# Patient Record
Sex: FEMALE | Race: WHITE | NOT HISPANIC OR LATINO | Employment: FULL TIME | URBAN - METROPOLITAN AREA
[De-identification: names, ages, dates, MRNs, and addresses within clinical notes are randomized per-mention and may not be internally consistent; named-entity substitution may affect disease eponyms.]

---

## 2017-01-12 ENCOUNTER — HOSPITAL ENCOUNTER (OUTPATIENT)
Dept: SLEEP CENTER | Facility: CLINIC | Age: 47
Discharge: HOME | End: 2017-01-12
Payer: COMMERCIAL

## 2017-01-12 ENCOUNTER — TRANSCRIBE ORDERS (OUTPATIENT)
Dept: SLEEP CENTER | Facility: CLINIC | Age: 47
End: 2017-01-12

## 2017-01-12 DIAGNOSIS — G47.33 OSA (OBSTRUCTIVE SLEEP APNEA): Primary | ICD-10-CM

## 2017-01-12 DIAGNOSIS — G47.33 OBSTRUCTIVE SLEEP APNEA (ADULT) (PEDIATRIC): ICD-10-CM

## 2017-01-12 DIAGNOSIS — G47.33 OSA (OBSTRUCTIVE SLEEP APNEA): ICD-10-CM

## 2017-01-12 PROCEDURE — 95811 POLYSOM 6/>YRS CPAP 4/> PARM: CPT

## 2017-01-26 ENCOUNTER — HOSPITAL ENCOUNTER (OUTPATIENT)
Dept: SLEEP CENTER | Facility: CLINIC | Age: 47
Discharge: HOME | End: 2017-01-26
Payer: COMMERCIAL

## 2017-01-26 ENCOUNTER — TRANSCRIBE ORDERS (OUTPATIENT)
Dept: SLEEP CENTER | Facility: CLINIC | Age: 47
End: 2017-01-26

## 2017-01-26 DIAGNOSIS — G47.33 OSA (OBSTRUCTIVE SLEEP APNEA): Primary | ICD-10-CM

## 2017-01-26 DIAGNOSIS — G47.33 OSA (OBSTRUCTIVE SLEEP APNEA): ICD-10-CM

## 2017-03-28 ENCOUNTER — TRANSCRIBE ORDERS (OUTPATIENT)
Dept: SLEEP CENTER | Facility: CLINIC | Age: 47
End: 2017-03-28

## 2017-03-28 ENCOUNTER — HOSPITAL ENCOUNTER (OUTPATIENT)
Dept: SLEEP CENTER | Facility: CLINIC | Age: 47
Discharge: HOME | End: 2017-03-28
Payer: COMMERCIAL

## 2017-03-28 DIAGNOSIS — G47.33 OSA (OBSTRUCTIVE SLEEP APNEA): Primary | ICD-10-CM

## 2017-03-28 DIAGNOSIS — G47.33 OSA (OBSTRUCTIVE SLEEP APNEA): ICD-10-CM

## 2017-04-10 ENCOUNTER — GENERIC CONVERSION - ENCOUNTER (OUTPATIENT)
Dept: OTHER | Facility: OTHER | Age: 47
End: 2017-04-10

## 2017-08-01 ENCOUNTER — ALLSCRIPTS OFFICE VISIT (OUTPATIENT)
Dept: OTHER | Facility: OTHER | Age: 47
End: 2017-08-01

## 2017-08-01 ENCOUNTER — LAB REQUISITION (OUTPATIENT)
Dept: LAB | Facility: HOSPITAL | Age: 47
End: 2017-08-01
Payer: COMMERCIAL

## 2017-08-01 DIAGNOSIS — E78.5 HYPERLIPIDEMIA: ICD-10-CM

## 2017-08-01 DIAGNOSIS — I10 ESSENTIAL (PRIMARY) HYPERTENSION: ICD-10-CM

## 2017-08-01 LAB
ALBUMIN SERPL BCP-MCNC: 3.9 G/DL (ref 3.5–5)
ALP SERPL-CCNC: 110 U/L (ref 46–116)
ALT SERPL W P-5'-P-CCNC: 33 U/L (ref 12–78)
ANION GAP SERPL CALCULATED.3IONS-SCNC: 7 MMOL/L (ref 4–13)
AST SERPL W P-5'-P-CCNC: 23 U/L (ref 5–45)
BASOPHILS # BLD AUTO: 0.03 THOUSANDS/ΜL (ref 0–0.1)
BASOPHILS NFR BLD AUTO: 1 % (ref 0–1)
BILIRUB SERPL-MCNC: 0.36 MG/DL (ref 0.2–1)
BUN SERPL-MCNC: 16 MG/DL (ref 5–25)
CALCIUM SERPL-MCNC: 8.7 MG/DL (ref 8.3–10.1)
CHLORIDE SERPL-SCNC: 104 MMOL/L (ref 100–108)
CHOLEST SERPL-MCNC: 208 MG/DL (ref 50–200)
CO2 SERPL-SCNC: 31 MMOL/L (ref 21–32)
CREAT SERPL-MCNC: 0.92 MG/DL (ref 0.6–1.3)
EOSINOPHIL # BLD AUTO: 0.07 THOUSAND/ΜL (ref 0–0.61)
EOSINOPHIL NFR BLD AUTO: 1 % (ref 0–6)
ERYTHROCYTE [DISTWIDTH] IN BLOOD BY AUTOMATED COUNT: 14.1 % (ref 11.6–15.1)
EST. AVERAGE GLUCOSE BLD GHB EST-MCNC: 126 MG/DL
GFR SERPL CREATININE-BSD FRML MDRD: 74 ML/MIN/1.73SQ M
GLUCOSE SERPL-MCNC: 89 MG/DL (ref 65–140)
HBA1C MFR BLD: 6 % (ref 4.2–6.3)
HCT VFR BLD AUTO: 37.1 % (ref 34.8–46.1)
HDLC SERPL-MCNC: 51 MG/DL (ref 40–60)
HGB BLD-MCNC: 12.8 G/DL (ref 11.5–15.4)
LDLC SERPL CALC-MCNC: 126 MG/DL (ref 0–100)
LYMPHOCYTES # BLD AUTO: 1.55 THOUSANDS/ΜL (ref 0.6–4.47)
LYMPHOCYTES NFR BLD AUTO: 28 % (ref 14–44)
MCH RBC QN AUTO: 28.6 PG (ref 26.8–34.3)
MCHC RBC AUTO-ENTMCNC: 34.5 G/DL (ref 31.4–37.4)
MCV RBC AUTO: 83 FL (ref 82–98)
MONOCYTES # BLD AUTO: 0.4 THOUSAND/ΜL (ref 0.17–1.22)
MONOCYTES NFR BLD AUTO: 7 % (ref 4–12)
NEUTROPHILS # BLD AUTO: 3.44 THOUSANDS/ΜL (ref 1.85–7.62)
NEUTS SEG NFR BLD AUTO: 63 % (ref 43–75)
NRBC BLD AUTO-RTO: 0 /100 WBCS
PLATELET # BLD AUTO: 174 THOUSANDS/UL (ref 149–390)
PMV BLD AUTO: 11.4 FL (ref 8.9–12.7)
POTASSIUM SERPL-SCNC: 3.7 MMOL/L (ref 3.5–5.3)
PROT SERPL-MCNC: 7.3 G/DL (ref 6.4–8.2)
RBC # BLD AUTO: 4.47 MILLION/UL (ref 3.81–5.12)
SODIUM SERPL-SCNC: 142 MMOL/L (ref 136–145)
TRIGL SERPL-MCNC: 155 MG/DL
TSH SERPL DL<=0.05 MIU/L-ACNC: 3.52 UIU/ML (ref 0.36–3.74)
WBC # BLD AUTO: 5.5 THOUSAND/UL (ref 4.31–10.16)

## 2017-08-01 PROCEDURE — 83036 HEMOGLOBIN GLYCOSYLATED A1C: CPT | Performed by: FAMILY MEDICINE

## 2017-08-01 PROCEDURE — 80053 COMPREHEN METABOLIC PANEL: CPT | Performed by: FAMILY MEDICINE

## 2017-08-01 PROCEDURE — 84443 ASSAY THYROID STIM HORMONE: CPT | Performed by: FAMILY MEDICINE

## 2017-08-01 PROCEDURE — 80061 LIPID PANEL: CPT | Performed by: FAMILY MEDICINE

## 2017-08-01 PROCEDURE — 85025 COMPLETE CBC W/AUTO DIFF WBC: CPT | Performed by: FAMILY MEDICINE

## 2017-08-04 ENCOUNTER — GENERIC CONVERSION - ENCOUNTER (OUTPATIENT)
Dept: OTHER | Facility: OTHER | Age: 47
End: 2017-08-04

## 2017-10-19 ENCOUNTER — TRANSCRIBE ORDERS (OUTPATIENT)
Dept: ADMINISTRATIVE | Facility: HOSPITAL | Age: 47
End: 2017-10-19

## 2017-10-19 DIAGNOSIS — Z12.39 SCREENING BREAST EXAMINATION: Primary | ICD-10-CM

## 2017-11-18 ENCOUNTER — HOSPITAL ENCOUNTER (OUTPATIENT)
Dept: MAMMOGRAPHY | Facility: MEDICAL CENTER | Age: 47
Discharge: HOME | End: 2017-11-18
Payer: COMMERCIAL

## 2017-11-18 DIAGNOSIS — Z12.39 SCREENING BREAST EXAMINATION: ICD-10-CM

## 2017-11-18 PROCEDURE — 77063 BREAST TOMOSYNTHESIS BI: CPT

## 2017-11-18 PROCEDURE — G0202 SCR MAMMO BI INCL CAD: HCPCS

## 2018-01-12 VITALS
BODY MASS INDEX: 28.56 KG/M2 | WEIGHT: 182 LBS | HEIGHT: 67 IN | DIASTOLIC BLOOD PRESSURE: 80 MMHG | TEMPERATURE: 97.3 F | SYSTOLIC BLOOD PRESSURE: 120 MMHG

## 2018-01-12 NOTE — RESULT NOTES
Discussion/Summary   Blood testing looks okay  Verified Results  (1) CBC/PLT/DIFF [id number] 07:18PM Cindy Norris Order Number: [id number]     Test Name Result Flag Reference   WBC COUNT 5 50 Thousand/uL  4 31-10 16   RBC COUNT 4 47 Million/uL  3 81-5 12   HEMOGLOBIN 12 8 g/dL  11 5-15 4   HEMATOCRIT 37 1 %  34 8-46  1   MCV 83 fL  82-98   MCH 28 6 pg  26 8-34 3   MCHC 34 5 g/dL  31 4-37 4   RDW 14 1 %  11 6-15 1   MPV 11 4 fL  8 9-12 7   PLATELET COUNT 305 Thousands/uL  149-390   nRBC AUTOMATED 0 /100 WBCs     NEUTROPHILS RELATIVE PERCENT 63 %  43-75   LYMPHOCYTES RELATIVE PERCENT 28 %  14-44   MONOCYTES RELATIVE PERCENT 7 %  4-12   EOSINOPHILS RELATIVE PERCENT 1 %  0-6   BASOPHILS RELATIVE PERCENT 1 %  0-1   NEUTROPHILS ABSOLUTE COUNT 3 44 Thousands/? ??L  1 85-7 62   LYMPHOCYTES ABSOLUTE COUNT 1 55 Thousands/? ??L  0 60-4 47   MONOCYTES ABSOLUTE COUNT 0 40 Thousand/? ??L  0 17-1 22   EOSINOPHILS ABSOLUTE COUNT 0 07 Thousand/? ??L  0 00-0 61   BASOPHILS ABSOLUTE COUNT 0 03 Thousands/? ??L  0 00-0 10     (1) COMPREHENSIVE METABOLIC PANEL [id number] 64:14IS Cindy Norris Order Number: [id number]     Test Name Result Flag Reference   GLUCOSE,RANDM 89 mg/dL     If the patient is fasting, the ADA then defines impaired fasting glucose as > 100 mg/dL and diabetes as > or equal to 123 mg/dL     SODIUM 142 mmol/L  136-145   POTASSIUM 3 7 mmol/L  3 5-5 3   CHLORIDE 104 mmol/L  100-108   CARBON DIOXIDE 31 mmol/L  21-32   ANION GAP (CALC) 7 mmol/L  4-13   BLOOD UREA NITROGEN 16 mg/dL  5-25   CREATININE 0 92 mg/dL  0 60-1 30   Standardized to IDMS reference method   CALCIUM 8 7 mg/dL  8 3-10 1   BILI, TOTAL 0 36 mg/dL  0 20-1 00   ALK PHOSPHATAS 110 U/L     ALT (SGPT) 33 U/L  12-78   AST(SGOT) 23 U/L  5-45   ALBUMIN 3 9 g/dL  3 5-5 0   TOTAL PROTEIN 7 3 g/dL  6 4-8 2   eGFR 74 ml/min/1 73sq m     National Kidney Disease Education Program recommendations are as follows:  GFR calculation is accurate only with a steady state creatinine  Chronic Kidney disease less than 60 ml/min/1 73 sq  meters  Kidney failure less than 15 ml/min/1 73 sq  meters  (1) HEMOGLOBIN A1C 01Aug2017 07:18PM Virgin Kubas Order Number: [id number]     Test Name Result Flag Reference   HEMOGLOBIN A1C 6 0 %  4 2-6 3   EST  AVG  GLUCOSE 126 mg/dl       (1) LIPID PANEL, FASTING 01Aug2017 07:18PM Virgin Kubas Order Number: [id number]     Test Name Result Flag Reference   CHOLESTEROL 208 mg/dL H    HDL,DIRECT 51 mg/dL  40-60   Specimen collection should occur prior to Metamizole administration due to the potential for falsely depressed results  LDL CHOLESTEROL CALCULATED 126 mg/dL H 0-100   Triglyceride:         Normal              <150 mg/dl       Borderline High    150-199 mg/dl       High               200-499 mg/dl       Very High          >499 mg/dl  Cholesterol:         Desirable        <200 mg/dl      Borderline High  200-239 mg/dl      High             >239 mg/dl  HDL Cholesterol:        High    >59 mg/dL      Low     <41 mg/dL  LDL CALCULATED:    This screening LDL is a calculated result  It does not have the accuracy of the Direct Measured LDL in the monitoring of patients with hyperlipidemia and/or statin therapy  Direct Measure LDL ([id number]) must be ordered separately in these patients  TRIGLYCERIDES 155 mg/dL H <=150   Specimen collection should occur prior to N-Acetylcysteine or Metamizole administration due to the potential for falsely depressed results  (1) TSH WITH FT4 REFLEX 01Aug2017 07:18PM Virgin Kubas Order Number: [id number]     Test Name Result Flag Reference   TSH 3 520 uIU/mL  0 358-3 740   Patients undergoing fluorescein dye angiography may retain small amounts of fluorescein in the body for 48-72 hours post procedure  Samples containing fluorescein can produce falsely depressed TSH values   If the patient had this procedure,a specimen should be resubmitted post fluorescein clearance            The recommended reference ranges for TSH during pregnancy are as follows:  First trimester 0 1 to 2 5 uIU/mL  Second trimester  0 2 to 3 0 uIU/mL  Third trimester 0 3 to 3 0 uIU/m

## 2018-01-17 NOTE — RESULT NOTES
Message   THyroid levels look ok     Verified Results  (1) TSH [id number] 09:07AM Maury Carr     Test Name Result Flag Reference   TSH 1 850 uIU/mL  0 358-3 740   Patients undergoing fluorescein dye angiography may retain small amounts of fluorescein in the body for 48-72 hours post procedure  Samples containing fluorescein can produce falsely depressed TSH values  If the patient had this procedure,a specimen should be resubmitted post fluorescein clearance            The recommended reference ranges for TSH during pregnancy are as follows:  First trimester 0 1 to 2 5 uIU/mL  Second trimester  0 2 to 3 0 uIU/mL  Third trimester 0 3 to 3 0 uIU/m

## 2018-03-02 ENCOUNTER — OFFICE VISIT (OUTPATIENT)
Dept: FAMILY MEDICINE CLINIC | Facility: CLINIC | Age: 48
End: 2018-03-02
Payer: COMMERCIAL

## 2018-03-02 VITALS
DIASTOLIC BLOOD PRESSURE: 84 MMHG | BODY MASS INDEX: 30.86 KG/M2 | HEART RATE: 87 BPM | HEIGHT: 66 IN | WEIGHT: 192 LBS | SYSTOLIC BLOOD PRESSURE: 128 MMHG | TEMPERATURE: 98.2 F

## 2018-03-02 DIAGNOSIS — R63.5 WEIGHT GAIN: Primary | ICD-10-CM

## 2018-03-02 DIAGNOSIS — E78.5 DYSLIPIDEMIA: ICD-10-CM

## 2018-03-02 DIAGNOSIS — I10 ESSENTIAL HYPERTENSION: ICD-10-CM

## 2018-03-02 PROBLEM — G47.33 OBSTRUCTIVE SLEEP APNEA: Status: ACTIVE | Noted: 2017-08-01

## 2018-03-02 LAB
ALBUMIN SERPL BCP-MCNC: 4.4 G/DL (ref 3.5–5)
ALP SERPL-CCNC: 128 U/L (ref 46–116)
ALT SERPL W P-5'-P-CCNC: 64 U/L (ref 12–78)
ANION GAP SERPL CALCULATED.3IONS-SCNC: 6 MMOL/L (ref 4–13)
AST SERPL W P-5'-P-CCNC: 35 U/L (ref 5–45)
BASOPHILS # BLD AUTO: 0.03 THOUSANDS/ΜL (ref 0–0.1)
BASOPHILS NFR BLD AUTO: 1 % (ref 0–1)
BILIRUB SERPL-MCNC: 0.41 MG/DL (ref 0.2–1)
BUN SERPL-MCNC: 16 MG/DL (ref 5–25)
CALCIUM SERPL-MCNC: 9.6 MG/DL (ref 8.3–10.1)
CHLORIDE SERPL-SCNC: 99 MMOL/L (ref 100–108)
CO2 SERPL-SCNC: 31 MMOL/L (ref 21–32)
CREAT SERPL-MCNC: 0.88 MG/DL (ref 0.6–1.3)
EOSINOPHIL # BLD AUTO: 0.11 THOUSAND/ΜL (ref 0–0.61)
EOSINOPHIL NFR BLD AUTO: 2 % (ref 0–6)
ERYTHROCYTE [DISTWIDTH] IN BLOOD BY AUTOMATED COUNT: 13.5 % (ref 11.6–15.1)
EST. AVERAGE GLUCOSE BLD GHB EST-MCNC: 120 MG/DL
GFR SERPL CREATININE-BSD FRML MDRD: 78 ML/MIN/1.73SQ M
GLUCOSE P FAST SERPL-MCNC: 85 MG/DL (ref 65–99)
HBA1C MFR BLD: 5.8 % (ref 4.2–6.3)
HCT VFR BLD AUTO: 39.7 % (ref 34.8–46.1)
HGB BLD-MCNC: 13.9 G/DL (ref 11.5–15.4)
LYMPHOCYTES # BLD AUTO: 1.21 THOUSANDS/ΜL (ref 0.6–4.47)
LYMPHOCYTES NFR BLD AUTO: 20 % (ref 14–44)
MCH RBC QN AUTO: 29.8 PG (ref 26.8–34.3)
MCHC RBC AUTO-ENTMCNC: 35 G/DL (ref 31.4–37.4)
MCV RBC AUTO: 85 FL (ref 82–98)
MONOCYTES # BLD AUTO: 0.47 THOUSAND/ΜL (ref 0.17–1.22)
MONOCYTES NFR BLD AUTO: 8 % (ref 4–12)
NEUTROPHILS # BLD AUTO: 4.13 THOUSANDS/ΜL (ref 1.85–7.62)
NEUTS SEG NFR BLD AUTO: 69 % (ref 43–75)
NRBC BLD AUTO-RTO: 0 /100 WBCS
PLATELET # BLD AUTO: 236 THOUSANDS/UL (ref 149–390)
PMV BLD AUTO: 11 FL (ref 8.9–12.7)
POTASSIUM SERPL-SCNC: 3.9 MMOL/L (ref 3.5–5.3)
PROT SERPL-MCNC: 7.9 G/DL (ref 6.4–8.2)
RBC # BLD AUTO: 4.67 MILLION/UL (ref 3.81–5.12)
SODIUM SERPL-SCNC: 136 MMOL/L (ref 136–145)
TSH SERPL DL<=0.05 MIU/L-ACNC: 2.16 UIU/ML (ref 0.36–3.74)
WBC # BLD AUTO: 5.96 THOUSAND/UL (ref 4.31–10.16)

## 2018-03-02 PROCEDURE — 80053 COMPREHEN METABOLIC PANEL: CPT | Performed by: FAMILY MEDICINE

## 2018-03-02 PROCEDURE — 83036 HEMOGLOBIN GLYCOSYLATED A1C: CPT | Performed by: FAMILY MEDICINE

## 2018-03-02 PROCEDURE — 99213 OFFICE O/P EST LOW 20 MIN: CPT | Performed by: FAMILY MEDICINE

## 2018-03-02 PROCEDURE — 84443 ASSAY THYROID STIM HORMONE: CPT | Performed by: FAMILY MEDICINE

## 2018-03-02 PROCEDURE — 85025 COMPLETE CBC W/AUTO DIFF WBC: CPT | Performed by: FAMILY MEDICINE

## 2018-03-02 PROCEDURE — 36415 COLL VENOUS BLD VENIPUNCTURE: CPT | Performed by: FAMILY MEDICINE

## 2018-03-02 RX ORDER — FLUOXETINE HYDROCHLORIDE 20 MG/1
1 CAPSULE ORAL DAILY
COMMUNITY
Start: 2012-01-30 | End: 2018-05-01 | Stop reason: SDUPTHER

## 2018-03-02 RX ORDER — LEVOTHYROXINE SODIUM 0.05 MG/1
1 TABLET ORAL DAILY
COMMUNITY
Start: 2012-01-30 | End: 2018-08-24 | Stop reason: SDUPTHER

## 2018-03-02 RX ORDER — LOTEPREDNOL ETABONATE 5 MG/G
1 GEL OPHTHALMIC
COMMUNITY
Start: 2017-04-27

## 2018-03-02 RX ORDER — CARBOXYMETHYLCELLULOSE SODIUM 5 MG/ML
SOLUTION/ DROPS OPHTHALMIC
COMMUNITY
Start: 2014-05-01

## 2018-03-02 RX ORDER — HYDROCHLOROTHIAZIDE 25 MG/1
1 TABLET ORAL DAILY
COMMUNITY
Start: 2016-09-05 | End: 2018-08-24 | Stop reason: SDUPTHER

## 2018-03-02 RX ORDER — TRAMADOL HYDROCHLORIDE 50 MG/1
2 TABLET ORAL DAILY
COMMUNITY
End: 2018-12-20 | Stop reason: ALTCHOICE

## 2018-03-02 RX ORDER — ATORVASTATIN CALCIUM 10 MG/1
1 TABLET, FILM COATED ORAL DAILY
COMMUNITY
Start: 2014-09-23 | End: 2018-10-03 | Stop reason: SDUPTHER

## 2018-03-02 RX ORDER — TIMOLOL MALEATE 5 MG/ML
1 SOLUTION/ DROPS OPHTHALMIC
COMMUNITY
Start: 2016-08-05 | End: 2021-09-29 | Stop reason: ALTCHOICE

## 2018-03-02 RX ORDER — ACETAMINOPHEN 325 MG/1
TABLET ORAL AS NEEDED
COMMUNITY
Start: 2009-11-02

## 2018-03-02 NOTE — PROGRESS NOTES
Assessment/Plan:   no significant findings on physical exam   Await blood test results  Consider follow-up with weight  her dietitian further input on weight gain  She will call if any new or worsening symptoms  No problem-specific Assessment & Plan notes found for this encounter  Diagnoses and all orders for this visit:    Weight gain  -     CBC and differential  -     Comprehensive metabolic panel  -     TSH, 3rd generation with T4 reflex  -     HEMOGLOBIN A1C W/ EAG ESTIMATION    Dyslipidemia  -     CBC and differential  -     Comprehensive metabolic panel  -     TSH, 3rd generation with T4 reflex  -     HEMOGLOBIN A1C W/ EAG ESTIMATION    Essential hypertension  -     CBC and differential  -     Comprehensive metabolic panel  -     TSH, 3rd generation with T4 reflex  -     HEMOGLOBIN A1C W/ EAG ESTIMATION    Other orders  -     acetaminophen (TYLENOL) 325 mg tablet; Take by mouth  -     atorvastatin (LIPITOR) 10 mg tablet; Take 1 tablet by mouth daily  -     carboxymethylcellulose (REFRESH PLUS) 0 5 % SOLN; Apply to eye  -     timolol (TIMOPTIC) 0 5 % ophthalmic solution; 1 drop In rt eye nightly  -     FLUoxetine (PROzac) 20 mg capsule; Take 1 capsule by mouth daily  -     hydrochlorothiazide (HYDRODIURIL) 25 mg tablet; Take 1 tablet by mouth daily  -     levothyroxine 50 mcg tablet; Take 1 tablet by mouth daily  -     loteprednol etabonate (LOTEMAX) 0 5 % ophth gel; Administer 1 drop to the right eye Every other day  -     traMADol (ULTRAM) 50 mg tablet; Take 2 tablets by mouth daily In the morning          Subjective:      Patient ID: Melvin Hdz is a 52 y o  female  Patient is here for concern about weight gain  She has been less active over the winter but does note some mild fatigue and mild weight gain  She is not eating any differently  Denies any chest pain shortness of breath or sleep disturbance  She otherwise is feeling well    She does continue to follow with ophthalmologist on a regular basis  Ankle Swelling   Associated symptoms include fatigue  Fatigue   Associated symptoms include fatigue  The following portions of the patient's history were reviewed and updated as appropriate: allergies, current medications, past family history, past medical history, past social history, past surgical history and problem list     Review of Systems   Constitutional: Positive for fatigue and unexpected weight change (  Mild weight gain)  HENT: Negative  Eyes: Negative  Respiratory: Negative  Cardiovascular: Negative  Gastrointestinal: Negative  Endocrine: Negative  Genitourinary: Negative  Musculoskeletal: Negative  Skin: Negative  Allergic/Immunologic: Negative  Neurological: Negative  Hematological: Negative  Psychiatric/Behavioral: Negative  Objective:      /84 (BP Location: Left arm, Patient Position: Sitting, Cuff Size: Large)   Pulse 87   Temp 98 2 °F (36 8 °C) (Tympanic)   Ht 5' 6 25" (1 683 m)   Wt 87 1 kg (192 lb)   BMI 30 76 kg/m²          Physical Exam   Constitutional: She is oriented to person, place, and time  She appears well-developed and well-nourished  HENT:   Head: Normocephalic and atraumatic  Right Ear: External ear normal  Tympanic membrane is not erythematous and not bulging  Left Ear: External ear normal  Tympanic membrane is not erythematous and not bulging  Nose: Nose normal    Mouth/Throat: Oropharynx is clear and moist and mucous membranes are normal  No oral lesions  No oropharyngeal exudate  Eyes: Conjunctivae and EOM are normal  Right eye exhibits no discharge  Left eye exhibits no discharge  No scleral icterus  Neck: Normal range of motion  Neck supple  No thyromegaly present  Cardiovascular: Normal rate, regular rhythm and normal heart sounds  Exam reveals no gallop and no friction rub  No murmur heard  Pulmonary/Chest: Effort normal  No respiratory distress  She has no wheezes  She has no rales  She exhibits no tenderness  Abdominal: Soft  Bowel sounds are normal  She exhibits no distension and no mass  There is no tenderness  There is no rebound and no guarding  Musculoskeletal: Normal range of motion  She exhibits no edema, tenderness or deformity  Lymphadenopathy:     She has no cervical adenopathy  Neurological: She is alert and oriented to person, place, and time  She has normal reflexes  No cranial nerve deficit  She exhibits normal muscle tone  Coordination normal    Skin: Skin is warm and dry  No rash noted  No erythema  No pallor  Psychiatric: She has a normal mood and affect  Her behavior is normal    Vitals reviewed

## 2018-03-30 ENCOUNTER — OFFICE VISIT (OUTPATIENT)
Dept: SLEEP CENTER | Facility: CLINIC | Age: 48
End: 2018-03-30

## 2018-03-30 VITALS
SYSTOLIC BLOOD PRESSURE: 110 MMHG | HEART RATE: 80 BPM | BODY MASS INDEX: 30.92 KG/M2 | WEIGHT: 192.4 LBS | HEIGHT: 66 IN | DIASTOLIC BLOOD PRESSURE: 62 MMHG

## 2018-03-30 DIAGNOSIS — G47.50 PARASOMNIA: ICD-10-CM

## 2018-03-30 DIAGNOSIS — G25.81 RESTLESS LEG SYNDROME: ICD-10-CM

## 2018-03-30 DIAGNOSIS — G47.33 OBSTRUCTIVE SLEEP APNEA: Primary | ICD-10-CM

## 2018-03-30 DIAGNOSIS — F45.8 AEROPHAGIA: ICD-10-CM

## 2018-03-30 DIAGNOSIS — E66.9 OBESITY (BMI 30-39.9): ICD-10-CM

## 2018-03-30 NOTE — PROGRESS NOTES
Review of Systems      Genitourinary hot flashes and post menopausal   Cardiology none   Gastrointestinal none   Neurology none   Constitutional weight change of 10 or more pounds in the past year gain of 20 pounds   Integumentary none   Psychiatry none   Musculoskeletal back pain   Pulmonary none   ENT none   Endocrine none   Hematological none

## 2018-03-30 NOTE — PROGRESS NOTES
Follow-Up Note - [facility]   Micah Moore  52 y o  female  UTS:[birth year]  AIK:[id number]    CC: I saw this patient for follow-up in clinic today for her Sleep Disordered Breathing, Coexisting Sleep and Medical Problems  Medications, Past, Family & Social History were reviewed  [Interval changes notable for some weight gain]   She  has a past medical history of Herniated nucleus pulposus, L5-S1  She has a current medication list which includes the following prescription(s): acetaminophen, atorvastatin, carboxymethylcellulose, fluoxetine, hydrochlorothiazide, levothyroxine, loteprednol etabonate, timolol, and tramadol  ROS: reviewed, see attached   Mood is stable on current medication  Pain is controlled  HPI:  With respect to positive airway pressure therapy (PAP) compliance data download shows: [ she is using PAP > 4 hours per night 100% of the time and AHI is 5 1/hour at [90th percentile] pressure of 11 9cm H2O ]   Micah reports:   -  some difficulty tolerating PAP; [ mask causes redness / facial marks ] she continues to have aerophagia  She does eat or drink close to bedtime  -  benefiting from use ; [sleeping better]   -she has occasional restless leg symptoms and is not aware of jerking movements during sleep   -she sleep talks and at times awakens herself  -her  reports mild intermittent snoring at the current pressure setting     Sleep Routine: Micah reports getting [sufficient] sleep[  ]; she has no difficulty initiating or maintaining sleep   She awakens with the aid of an alarm feeling refreshed She denies excessive drowsiness  [She rated herself at Total score: 8 /24 on the [place] sleepiness scale ]      EXAM: Vitals are stable: Blood pressure 110/62, pulse 80, height 5' 6" (1 676 m), weight 87 3 kg (192 lb 6 4 oz)  Body mass index is 31 05 kg/m²  Elinor Settle Neck Circumference: 38 cm  Patient is alert, orientated, cooperative [and in no distress]    Mental state [appears normal]  There are [no] facial pressure marks or rashes  [  ] Physical findings are essentially unchanged from previous  IMPRESSION:     1  Obstructive sleep apnea     2  Parasomnia     3  Restless leg syndrome     4  Aerophagia         PLAN:  1  Treatment with  PAP is medically necessary and Dasia Scales is agreable to continue use  2  Pressure setting: [Continue at higher setting of 11-14 cm H2O]   3  Instruction on care of equipment and strategies to improve comfort with use of PAP were discussed [:controlling mucosal dryness, nasal symptoms and Mask leaks]  4  Care coordinated with DME provider and prescription to replace supplies as needed was provided  5  Need for compliance with therapy and weight reduction were emphasized  6  I advised avoiding eating or drinking close to bedtime  7  No treatment is needed for the movement disorder or parasomnia activity  8  With your consent, follow-up is advised in [1 Jillian Livingston [or sooner if needed] [to monitor progress, compliance and to adjust therapy]  Thank you for allowing me to participate in the care of this patient      Sincerely,    Javier Lindsey MD  Board Certified Specialist

## 2018-05-01 DIAGNOSIS — F41.1 GENERALIZED ANXIETY DISORDER: Primary | ICD-10-CM

## 2018-05-01 RX ORDER — FLUOXETINE HYDROCHLORIDE 20 MG/1
20 CAPSULE ORAL DAILY
Qty: 90 CAPSULE | Refills: 3 | Status: SHIPPED | OUTPATIENT
Start: 2018-05-01 | End: 2019-06-21 | Stop reason: SDUPTHER

## 2018-08-08 ENCOUNTER — APPOINTMENT (OUTPATIENT)
Dept: LAB | Facility: MEDICAL CENTER | Age: 48
End: 2018-08-08
Payer: COMMERCIAL

## 2018-08-08 ENCOUNTER — TRANSCRIBE ORDERS (OUTPATIENT)
Dept: ADMINISTRATIVE | Facility: HOSPITAL | Age: 48
End: 2018-08-08

## 2018-08-08 DIAGNOSIS — Z00.8 ENCOUNTER FOR OTHER GENERAL EXAMINATION: ICD-10-CM

## 2018-08-08 DIAGNOSIS — Z00.8 ENCOUNTER FOR OTHER GENERAL EXAMINATION: Primary | ICD-10-CM

## 2018-08-08 LAB
CHOLEST SERPL-MCNC: 272 MG/DL (ref 50–200)
EST. AVERAGE GLUCOSE BLD GHB EST-MCNC: 117 MG/DL
HBA1C MFR BLD: 5.7 % (ref 4.2–6.3)
HDLC SERPL-MCNC: 36 MG/DL (ref 40–60)
LDLC SERPL CALC-MCNC: 173 MG/DL (ref 0–100)
NONHDLC SERPL-MCNC: 236 MG/DL
TRIGL SERPL-MCNC: 313 MG/DL

## 2018-08-08 PROCEDURE — 83036 HEMOGLOBIN GLYCOSYLATED A1C: CPT

## 2018-08-08 PROCEDURE — 36415 COLL VENOUS BLD VENIPUNCTURE: CPT

## 2018-08-08 PROCEDURE — 80061 LIPID PANEL: CPT

## 2018-08-24 DIAGNOSIS — E03.9 HYPOTHYROIDISM, UNSPECIFIED TYPE: Primary | ICD-10-CM

## 2018-08-24 DIAGNOSIS — I10 ESSENTIAL HYPERTENSION: ICD-10-CM

## 2018-08-25 RX ORDER — HYDROCHLOROTHIAZIDE 25 MG/1
25 TABLET ORAL DAILY
Qty: 90 TABLET | Refills: 3 | Status: SHIPPED | OUTPATIENT
Start: 2018-08-25 | End: 2018-10-03 | Stop reason: SDUPTHER

## 2018-08-25 RX ORDER — LEVOTHYROXINE SODIUM 0.05 MG/1
50 TABLET ORAL DAILY
Qty: 90 TABLET | Refills: 3 | Status: SHIPPED | OUTPATIENT
Start: 2018-08-25 | End: 2019-09-25 | Stop reason: SDUPTHER

## 2018-10-03 ENCOUNTER — OFFICE VISIT (OUTPATIENT)
Dept: FAMILY MEDICINE CLINIC | Facility: CLINIC | Age: 48
End: 2018-10-03
Payer: COMMERCIAL

## 2018-10-03 VITALS
HEART RATE: 84 BPM | WEIGHT: 200 LBS | HEIGHT: 66 IN | TEMPERATURE: 98.2 F | DIASTOLIC BLOOD PRESSURE: 82 MMHG | BODY MASS INDEX: 32.14 KG/M2 | RESPIRATION RATE: 16 BRPM | SYSTOLIC BLOOD PRESSURE: 124 MMHG

## 2018-10-03 DIAGNOSIS — E78.5 DYSLIPIDEMIA: ICD-10-CM

## 2018-10-03 DIAGNOSIS — I10 ESSENTIAL HYPERTENSION: ICD-10-CM

## 2018-10-03 DIAGNOSIS — R60.0 LOWER EXTREMITY EDEMA: Primary | ICD-10-CM

## 2018-10-03 PROCEDURE — 1036F TOBACCO NON-USER: CPT | Performed by: FAMILY MEDICINE

## 2018-10-03 PROCEDURE — 99214 OFFICE O/P EST MOD 30 MIN: CPT | Performed by: FAMILY MEDICINE

## 2018-10-03 RX ORDER — HYDROCHLOROTHIAZIDE 50 MG/1
50 TABLET ORAL DAILY
Qty: 90 TABLET | Refills: 3 | Status: SHIPPED | OUTPATIENT
Start: 2018-10-03 | End: 2020-01-08 | Stop reason: SDUPTHER

## 2018-10-03 RX ORDER — ATORVASTATIN CALCIUM 10 MG/1
10 TABLET, FILM COATED ORAL DAILY
Qty: 90 TABLET | Refills: 3 | Status: SHIPPED | OUTPATIENT
Start: 2018-10-03 | End: 2020-05-18 | Stop reason: SDUPTHER

## 2018-10-03 NOTE — PROGRESS NOTES
Assessment/Plan:  No significant findings on physical exam today  We discussed treatment options  Consider wearing retention stocking and she may also increase dose of hydrochlorothiazide to 50 mg daily  Consider echocardiogram   She will call if any worsening symptoms including chest pain, shortness of breath or palpitations  Recommend regular exercise  No problem-specific Assessment & Plan notes found for this encounter  Diagnoses and all orders for this visit:    Lower extremity edema  -     Echo complete with contrast if indicated; Future    Essential hypertension  -     hydrochlorothiazide (HYDRODIURIL) 50 mg tablet; Take 1 tablet (50 mg total) by mouth daily  -     Echo complete with contrast if indicated; Future    Dyslipidemia  -     atorvastatin (LIPITOR) 10 mg tablet; Take 1 tablet (10 mg total) by mouth daily          Subjective:      Patient ID: Beth Montelongo is a 50 y o  female  Patient here for recheck on lower extremity edema  She notes she is sitting throughout most of the day and notes toward the end of the day she has increased edema to the legs  Denies any chest pain shortness of breath or palpitations  She is currently taking 25 mg hydrochlorothiazide daily  She does note that 50 mg when she takes this occasionally is helpful for leg edema  She notes she has a few distant family members with history of lymphedema  Denies any shortness of breath on exertion  No orthopnea  No other changes in diet or exercise  She is not typically 1 to exercise regularly  The following portions of the patient's history were reviewed and updated as appropriate: allergies, current medications, past family history, past medical history, past social history, past surgical history and problem list     Review of Systems   Constitutional: Negative  HENT: Negative  Eyes: Negative  Respiratory: Negative      Cardiovascular: Positive for leg swelling (Mild, most notable at the end of the day, bilateral   To lower extremities  )  Gastrointestinal: Negative  Endocrine: Negative  Genitourinary: Negative  Musculoskeletal: Negative  Skin: Negative  Allergic/Immunologic: Negative  Neurological: Negative  Hematological: Negative  Psychiatric/Behavioral: Negative  Objective:      /82 (BP Location: Left arm, Patient Position: Sitting, Cuff Size: Adult)   Pulse 84   Temp 98 2 °F (36 8 °C) (Tympanic)   Resp 16   Ht 5' 6" (1 676 m)   Wt 90 7 kg (200 lb)   BMI 32 28 kg/m²          Physical Exam   Constitutional: She is oriented to person, place, and time  She appears well-developed and well-nourished  HENT:   Head: Normocephalic and atraumatic  Right Ear: External ear normal  Tympanic membrane is not erythematous and not bulging  Left Ear: External ear normal  Tympanic membrane is not erythematous and not bulging  Nose: Nose normal    Mouth/Throat: Oropharynx is clear and moist and mucous membranes are normal  No oral lesions  No oropharyngeal exudate  Eyes: Conjunctivae and EOM are normal  Right eye exhibits no discharge  Left eye exhibits no discharge  No scleral icterus  Neck: Normal range of motion  Neck supple  No thyromegaly present  Cardiovascular: Normal rate, regular rhythm and normal heart sounds  Exam reveals no gallop and no friction rub  No murmur heard  Pulmonary/Chest: Effort normal  No respiratory distress  She has no wheezes  She has no rales  She exhibits no tenderness  Abdominal: Soft  Bowel sounds are normal  She exhibits no distension and no mass  There is no tenderness  There is no rebound and no guarding  Musculoskeletal: Normal range of motion  She exhibits no edema, tenderness or deformity  Lymphadenopathy:     She has no cervical adenopathy  Neurological: She is alert and oriented to person, place, and time  She has normal reflexes  No cranial nerve deficit  She exhibits normal muscle tone   Coordination normal  Skin: Skin is warm and dry  No rash noted  No erythema  No pallor  Psychiatric: She has a normal mood and affect  Her behavior is normal    Vitals reviewed

## 2018-10-26 ENCOUNTER — HOSPITAL ENCOUNTER (OUTPATIENT)
Dept: NON INVASIVE DIAGNOSTICS | Facility: HOSPITAL | Age: 48
Discharge: HOME | End: 2018-10-26
Payer: COMMERCIAL

## 2018-10-26 DIAGNOSIS — R60.0 LOWER EXTREMITY EDEMA: ICD-10-CM

## 2018-10-26 DIAGNOSIS — I10 ESSENTIAL HYPERTENSION: ICD-10-CM

## 2018-10-26 PROCEDURE — 93306 TTE W/DOPPLER COMPLETE: CPT

## 2018-10-26 PROCEDURE — 93306 TTE W/DOPPLER COMPLETE: CPT | Performed by: INTERNAL MEDICINE

## 2018-12-07 ENCOUNTER — TRANSCRIBE ORDERS (OUTPATIENT)
Dept: ADMINISTRATIVE | Facility: HOSPITAL | Age: 48
End: 2018-12-07

## 2018-12-07 DIAGNOSIS — Z12.39 SCREENING BREAST EXAMINATION: Primary | ICD-10-CM

## 2018-12-13 DIAGNOSIS — R60.0 LOWER EXTREMITY EDEMA: Primary | ICD-10-CM

## 2018-12-13 DIAGNOSIS — R60.0 LOWER EXTREMITY EDEMA: ICD-10-CM

## 2018-12-13 NOTE — TELEPHONE ENCOUNTER
Patient is requesting an order for compression stocking 3 pairs  knees high 20-30 she states her insurance will cover for this

## 2018-12-18 ENCOUNTER — TELEPHONE (OUTPATIENT)
Dept: FAMILY MEDICINE CLINIC | Facility: CLINIC | Age: 48
End: 2018-12-18

## 2018-12-18 NOTE — TELEPHONE ENCOUNTER
Alexandre Tolentino from  [facility] called stating that script for pts Compression Stockings needs to specify knee length  Can you please reorder this:  Compression stockings knee length, 20-30 compression, quantity of 6  To be faxed to Alexandre Tolentino @ [phone]    Please advise  Thank you

## 2018-12-20 ENCOUNTER — OFFICE VISIT (OUTPATIENT)
Dept: BARIATRICS | Facility: CLINIC | Age: 48
End: 2018-12-20
Payer: COMMERCIAL

## 2018-12-20 VITALS
WEIGHT: 198.9 LBS | DIASTOLIC BLOOD PRESSURE: 70 MMHG | HEIGHT: 66 IN | RESPIRATION RATE: 16 BRPM | BODY MASS INDEX: 31.97 KG/M2 | TEMPERATURE: 97.2 F | SYSTOLIC BLOOD PRESSURE: 118 MMHG | HEART RATE: 78 BPM

## 2018-12-20 DIAGNOSIS — E03.9 HYPOTHYROIDISM, UNSPECIFIED TYPE: ICD-10-CM

## 2018-12-20 DIAGNOSIS — E66.9 CLASS 1 OBESITY: ICD-10-CM

## 2018-12-20 DIAGNOSIS — R60.0 LOWER EXTREMITY EDEMA: Primary | ICD-10-CM

## 2018-12-20 DIAGNOSIS — R63.5 ABNORMAL WEIGHT GAIN: Primary | ICD-10-CM

## 2018-12-20 DIAGNOSIS — I10 ESSENTIAL HYPERTENSION: ICD-10-CM

## 2018-12-20 DIAGNOSIS — E78.5 DYSLIPIDEMIA: ICD-10-CM

## 2018-12-20 DIAGNOSIS — G47.33 OBSTRUCTIVE SLEEP APNEA: ICD-10-CM

## 2018-12-20 PROBLEM — R73.01 IMPAIRED FASTING GLUCOSE: Status: ACTIVE | Noted: 2018-12-20

## 2018-12-20 PROBLEM — E66.811 CLASS 1 OBESITY: Status: ACTIVE | Noted: 2018-03-30

## 2018-12-20 PROCEDURE — 99244 OFF/OP CNSLTJ NEW/EST MOD 40: CPT | Performed by: PHYSICIAN ASSISTANT

## 2018-12-20 NOTE — ASSESSMENT & PLAN NOTE
-stable on antihypertensive  -also uses HCTZ for pedal edema - encouraged use of compression stocking

## 2018-12-20 NOTE — PROGRESS NOTES
Assessment/Plan:    Class 1 obesity  -Discussed options of [facility]-Intensive Lifestyle Intervention Program, Very Low Calorie Diet-VLCD and Conservative Program and the role of weight loss medications   -Initial weight loss goal of 5-10% weight loss for improved health  -Screening labs: reviewed  -Patient is interested in pursuing [facility]    Essential hypertension  -stable on antihypertensive  -also uses HCTZ for pedal edema - encouraged use of compression stocking      Obstructive sleep apnea  -compliant with CPAP  -may improve with weight loss    Hypothyroidism  -TSH WNL on most recent lab work  -on levothyroxine    Dyslipidemia  -most recent lipid panel elevated  -had been off Lipitor for a period of time  Now has resumed  -may improve with dietary/lifestyle changes    Impaired fasting glucose  -HgbA1c 5 7, improved from prior  -avoid refined carbohydrates, increased cardiovascular activity as tolerated    Goals:    Food log (ie ) www myfitnesspal com,sparkpeople  com,loseit com,calorieking  com,etc    No sugary beverages  At least 64oz of water daily  Increase physical activity by 10 minutes daily  Gradually increase physical activity to a goal of 5 days per week for 30 minutes of MODERATE intensity PLUS 2 days per week of FULL BODY resistance training  If bp is consistently lower than 100/60 call family doctor to have HCTZ adjusted    Follow up in approximately 1 week with Non-Surgical Dietician  And 6 weeks after HC start with PA-C    Diagnoses and all orders for this visit:    Abnormal weight gain  Comments:  see plan under class 1 obesity    Essential hypertension    Obstructive sleep apnea    Hypothyroidism, unspecified type    Class 1 obesity    Dyslipidemia          Subjective:   Chief Complaint   Patient presents with    Consult     MWM consult        Patient ID: Og Odonnell  is a 50 y o  female with excess weight/obesity here to pursue weight managment      Past Medical History:   Diagnosis Date    Axenfeld-Dora syndrome     Disease of thyroid gland     Herniated nucleus pulposus, L5-S1     last assesed [id number]    Hypertension     Sleep apnea          HPI:  Obesity/Excess Weight:  Severity: Mild  Onset:  Past 2 years, reports gained 20 lbs since working a more sedentary job   Modifiers: Diet and Exercise and atkins diet  Contributing factors: Insufficient Physical Activity and more sedentary at work, snacking before bed, snacks on carbs and potato chips  Associated symptoms: decreased exercise capacity, body image issues, increased shortness of breath, decreased mobility and inability to do certain activities    Goals:  170 lbs, To fit into clothes better, decrease abdominal girth, reduce risk of cardiovascular risk    B: coffee + milk + sugar  S: skips  L: sandwich or soup  S: chips or cookies  D: protein + veggies + starch  S: potato chips    Fluid: water 48 oz, 2 cups of coffee + milk + sugar, occasional beer  Exercise: walking sporadically for 15 min     The following portions of the patient's history were reviewed and updated as appropriate: allergies, current medications, past family history, past medical history, past social history, past surgical history and problem list     Review of Systems   Constitutional: Negative for chills and fever  HENT: Negative for sore throat  Respiratory: Negative for cough and shortness of breath  Cardiovascular: Negative for chest pain and palpitations  Gastrointestinal: Negative for abdominal pain, constipation, diarrhea, nausea and vomiting  Genitourinary: Negative for dysuria  Musculoskeletal: Positive for arthralgias (stifness in AM)  Skin: Negative for rash  Neurological: Negative for dizziness and headaches  Psychiatric/Behavioral: The patient is nervous/anxious           Reports mood stable - feels irritable       Objective:    /70 (BP Location: Left arm, Patient Position: Sitting, Cuff Size: Adult)   Pulse 78   Temp (!) 97 2 °F (36 2 °C) (Tympanic)   Resp 16   Ht 5' 6" (1 676 m)   Wt 90 2 kg (198 lb 14 4 oz)   BMI 32 10 kg/m²     Physical Exam   Nursing note and vitals reviewed  Constitutional   General appearance: Abnormal   well developed and obese  Eyes No conjunctival pallor  Ears, Nose, Mouth, and Throat Oral mucosa moist    Pulmonary   Respiratory effort: No increased work of breathing or signs of respiratory distress  Auscultation of lungs: Clear to auscultation, equal breath sounds bilaterally, no wheezes, no rales, no rhonci  Cardiovascular   Auscultation of heart: Normal rate and rhythm, normal S1 and S2, without murmurs  Examination of extremities for edema and/or varicosities: + 1 edema bilaterally   Abdomen   Abdomen: Abnormal   The abdomen was obese  Bowel sounds were normal  The abdomen was soft and nontender     Musculoskeletal   Gait and station: Normal     Psychiatric   Orientation to person, place and time: Normal     Affect: appropriate

## 2018-12-20 NOTE — ASSESSMENT & PLAN NOTE
-Discussed options of [facility]-Intensive Lifestyle Intervention Program, Very Low Calorie Diet-VLCD and Conservative Program and the role of weight loss medications   -Initial weight loss goal of 5-10% weight loss for improved health  -Screening labs: reviewed  -Patient is interested in pursuing [facility]

## 2018-12-20 NOTE — ASSESSMENT & PLAN NOTE
-most recent lipid panel elevated  -had been off Lipitor for a period of time   Now has resumed  -may improve with dietary/lifestyle changes

## 2018-12-20 NOTE — ASSESSMENT & PLAN NOTE
-HgbA1c 5 7, improved from prior  -avoid refined carbohydrates, increased cardiovascular activity as tolerated

## 2018-12-20 NOTE — PATIENT INSTRUCTIONS
Goals: Food log (ie ) www myfitnesspal com,sparkpeople  com,loseit com,calorieking  com,etc    No sugary beverages  At least 64oz of water daily  Increase physical activity by 10 minutes daily   Gradually increase physical activity to a goal of 5 days per week for 30 minutes of MODERATE intensity PLUS 2 days per week of FULL BODY resistance training  If bp is consistently lower than 100/60 call family doctor to have HCTZ adjusted

## 2019-01-09 NOTE — PROGRESS NOTES
Weight Management Medical Nutrition Assessment    Is here for initial RD visit for Healthy Core program  Current wt: 197 lbs  Food recall reveals diet high in sodium, processed foods, often with long intervals between meals  Recently started walking  Goal to increase strength and lose weight  Due to cpap she was told not to eat or drink 2 hrs before bed & this has been helping to control her snacking  She generally skips breakfast so will utilize a meal replacement  In the past going to [facility] for lunch often but has reduced to 2x/wk and would like to decrease even more  Anthropometric Measurements  Start Weight (lbs): 197 0 lbs   Ideal Body Weight (lbs):130 lbs  Goal Weight (lbs):170 lbs    Weight Loss History  Previous weight loss attempts: Exercise  High Protein/Low CHO diets (Atkins, Union, etc )  Self Created Diets (Portion Control, Healthy Food Choices, etc )    Food and Nutrition Related History  Wake up: 5:30 a m  Bed Time: 10:00    Food Recall  Breakfast: skip or oatmeal or buchanan's egg mcmuffin meal, coffee w/1 tsp sugar, 1 tbsp hazelnut creamer   Snack:skip  Lunch: 11:30-12: seble shorti hoagie OR turkey, american cheese, 1 tsp travis on jaime roll, sometimes chips from home  Snack: skip  Dinner:5:30-6:00: 3 oz lean protein, ~1/2 cup carb, 1/2 cup veggies, 1 tsp butter  Snack: skip or in the past chips    Beverages: water and coffee/tea, alcohol  Volume of beverage intake: 3 cups coffee, 3 bottles water, 1 beer 4x/wk (blue moon)    Weekends: Improved, less seble  Cravings: chips  Trouble area of day:evening    Frequency of Eating out: 3x/wk  Food restrictions: n/a  Cooking: self   Food Shopping: self    Physical Activity Intake  Activity:Walking  Frequency:3x/wk, 20-30 minutes  Physical limitations/barriers to exercise: n/a    Estimated Needs  Energy  Tanita: BMR: 1638     X 1 3 -1000 =1129  [employer] Needs:  BMR : 6369  1-2# loss weekly sedentary:  848-1348            1-2# loss weekly lightly active: 0215-2852  Protein:71-89 gm   (1 2-1 5g/kg IBW)  Fluid: 69 oz   (35mL/kg IBW)    Nutrition Diagnosis  Yes; Overweight/obesity  related to Excess energy intake as evidenced by  BMI more than normative standard for age and sex (obesity-grade I 26-30  9)     Nutrition Intervention    Nutrition Prescription  Calories: 4588-8503  Protein:~75 gm  Fluid: ~64 oz    Meal Plan  Breakfast: 200, 27  Snack: 100-150, 5-10  Lunch: 330-420, 18-21  Snack 100-150, 5-10  Dinner: 315-455, 21  Snack: 0-150, 0-10     Nutrition Education:    Healthy Core Manual  Calorie controlled menu  Lean protein food choices  Healthy snack options  Food journaling tips    Nutrition Counseling:  Strategies: meal planning, portion sizes, healthy snack choices, hydration, fiber intake, protein intake, exercise, food journal    Monitoring and Evaluation:  Evaluation criteria:  Energy Intake  Meet protein needs  Maintain adequate hydration  Monitor weekly weight  Meal planning/preparation  Food journal   Decreased portions at mealtimes and snacks  Physical activity     Barriers to learning:none  Readiness to change: Action  Comprehension: very good  Expected Compliance: very good

## 2019-01-10 ENCOUNTER — OFFICE VISIT (OUTPATIENT)
Dept: BARIATRICS | Facility: CLINIC | Age: 49
End: 2019-01-10

## 2019-01-10 VITALS — BODY MASS INDEX: 31.66 KG/M2 | HEIGHT: 66 IN | WEIGHT: 197 LBS

## 2019-01-10 DIAGNOSIS — R63.5 ABNORMAL WEIGHT GAIN: ICD-10-CM

## 2019-01-10 PROCEDURE — RECHECK

## 2019-01-10 PROCEDURE — WMPFE WEIGHT MANAGEMENT PRO FEE EMPLOYEE

## 2019-01-17 ENCOUNTER — CLINICAL SUPPORT (OUTPATIENT)
Dept: BARIATRICS | Facility: CLINIC | Age: 49
End: 2019-01-17

## 2019-01-17 VITALS — BODY MASS INDEX: 31.12 KG/M2 | WEIGHT: 193.6 LBS | HEIGHT: 66 IN

## 2019-01-17 DIAGNOSIS — R63.5 ABNORMAL WEIGHT GAIN: Primary | ICD-10-CM

## 2019-01-17 PROCEDURE — RECHECK

## 2019-01-23 ENCOUNTER — HOSPITAL ENCOUNTER (OUTPATIENT)
Dept: MAMMOGRAPHY | Facility: MEDICAL CENTER | Age: 49
Discharge: HOME | End: 2019-01-23
Payer: COMMERCIAL

## 2019-01-23 VITALS — HEIGHT: 66 IN | WEIGHT: 193 LBS | BODY MASS INDEX: 31.02 KG/M2

## 2019-01-23 DIAGNOSIS — Z12.39 SCREENING BREAST EXAMINATION: ICD-10-CM

## 2019-01-23 PROCEDURE — 77063 BREAST TOMOSYNTHESIS BI: CPT

## 2019-01-23 PROCEDURE — 77067 SCR MAMMO BI INCL CAD: CPT

## 2019-01-26 ENCOUNTER — OFFICE VISIT (OUTPATIENT)
Dept: URGENT CARE | Age: 49
End: 2019-01-26
Payer: COMMERCIAL

## 2019-01-26 VITALS
WEIGHT: 194 LBS | TEMPERATURE: 98.2 F | DIASTOLIC BLOOD PRESSURE: 72 MMHG | HEART RATE: 96 BPM | OXYGEN SATURATION: 98 % | RESPIRATION RATE: 18 BRPM | BODY MASS INDEX: 31.18 KG/M2 | HEIGHT: 66 IN | SYSTOLIC BLOOD PRESSURE: 129 MMHG

## 2019-01-26 DIAGNOSIS — J01.00 ACUTE MAXILLARY SINUSITIS, RECURRENCE NOT SPECIFIED: Primary | ICD-10-CM

## 2019-01-26 PROCEDURE — 99213 OFFICE O/P EST LOW 20 MIN: CPT | Performed by: PHYSICIAN ASSISTANT

## 2019-01-26 PROCEDURE — S9088 SERVICES PROVIDED IN URGENT: HCPCS | Performed by: PHYSICIAN ASSISTANT

## 2019-01-26 RX ORDER — AMOXICILLIN AND CLAVULANATE POTASSIUM 875; 125 MG/1; MG/1
1 TABLET, FILM COATED ORAL EVERY 12 HOURS SCHEDULED
Qty: 14 TABLET | Refills: 0 | Status: SHIPPED | OUTPATIENT
Start: 2019-01-26 | End: 2019-02-02

## 2019-01-26 RX ORDER — PSEUDOEPHEDRINE HYDROCHLORIDE 30 MG/1
60 TABLET ORAL EVERY 4 HOURS PRN
COMMUNITY
End: 2021-09-29 | Stop reason: ALTCHOICE

## 2019-01-26 NOTE — PROGRESS NOTES
[facility]        NAME: Namrata Pastor is a 50 y o  female  : 1970    MRN: [id number]  DATE: 2019  TIME: 1:51 PM    Assessment and Plan   Acute maxillary sinusitis, recurrence not specified [J01 00]  1  Acute maxillary sinusitis, recurrence not specified  amoxicillin-clavulanate (AUGMENTIN) 875-125 mg per tablet         Patient Instructions       Follow up with PCP in 3-5 days  Proceed to  ER if symptoms worsen  Chief Complaint     Chief Complaint   Patient presents with    Cough     Productive cough, sore throat, sinus congsetion  Onset 1 week ago         History of Present Illness       Cough   This is a new problem  Episode onset: Nine days ago  The problem has been gradually worsening  The problem occurs every few minutes  The cough is non-productive  Associated symptoms include chills, ear congestion, nasal congestion, postnasal drip, rhinorrhea and a sore throat  Pertinent negatives include no chest pain, ear pain, fever, headaches, myalgias, rash, shortness of breath or wheezing  Nothing aggravates the symptoms  Treatments tried: Sudafed, Flonase  The treatment provided mild relief  Review of Systems   Review of Systems   Constitutional: Positive for chills  Negative for diaphoresis, fatigue and fever  HENT: Positive for postnasal drip, rhinorrhea, sinus pain, sinus pressure, sneezing and sore throat  Negative for congestion, ear pain and voice change  Eyes: Negative  Respiratory: Positive for cough  Negative for chest tightness, shortness of breath and wheezing  Cardiovascular: Negative for chest pain and palpitations  Gastrointestinal: Negative for constipation, diarrhea, nausea and vomiting  Endocrine: Negative  Genitourinary: Negative for dysuria  Musculoskeletal: Negative for back pain, myalgias and neck pain  Skin: Negative for pallor and rash  Allergic/Immunologic: Negative      Neurological: Negative for dizziness, syncope and headaches  Hematological: Negative  Psychiatric/Behavioral: Negative  Current Medications       Current Outpatient Prescriptions:     atorvastatin (LIPITOR) 10 mg tablet, Take 1 tablet (10 mg total) by mouth daily, Disp: 90 tablet, Rfl: 3    carboxymethylcellulose (REFRESH PLUS) 0 5 % SOLN, Apply to eye, Disp: , Rfl:     FLUoxetine (PROzac) 20 mg capsule, Take 1 capsule (20 mg total) by mouth daily, Disp: 90 capsule, Rfl: 3    hydrochlorothiazide (HYDRODIURIL) 50 mg tablet, Take 1 tablet (50 mg total) by mouth daily, Disp: 90 tablet, Rfl: 3    levothyroxine 50 mcg tablet, Take 1 tablet (50 mcg total) by mouth daily, Disp: 90 tablet, Rfl: 3    loteprednol etabonate (LOTEMAX) 0 5 % ophth gel, Administer 1 drop to the right eye Every other day, Disp: , Rfl:     pseudoephedrine (SUDAFED) 30 mg tablet, Take 60 mg by mouth every 4 (four) hours as needed for congestion, Disp: , Rfl:     timolol (TIMOPTIC) 0 5 % ophthalmic solution, 1 drop In rt eye nightly, Disp: , Rfl:     acetaminophen (TYLENOL) 325 mg tablet, Take by mouth, Disp: , Rfl:     amoxicillin-clavulanate (AUGMENTIN) 875-125 mg per tablet, Take 1 tablet by mouth every 12 (twelve) hours for 7 days, Disp: 14 tablet, Rfl: 0    Elastic Bandages & Supports (MEDICAL COMPRESSION STOCKINGS) MISC, Use daily as directed  20-30 compression  , Disp: 6 each, Rfl: 0    Elastic Bandages & Supports (MEDICAL COMPRESSION STOCKINGS) MISC, Use daily as directed  Knee length    Compression 20-30 , Disp: 6 each, Rfl: 0    Current Allergies     Allergies as of 01/26/2019 - Reviewed 01/26/2019   Allergen Reaction Noted    Sulfa antibiotics Rash             The following portions of the patient's history were reviewed and updated as appropriate: allergies, current medications, past family history, past medical history, past social history, past surgical history and problem list      Past Medical History:   Diagnosis Date    Axenfeld-Dora syndrome     Disease of thyroid gland     Herniated nucleus pulposus, L5-S1     last assesed [id number]    Hypertension     Sleep apnea        Past Surgical History:   Procedure Laterality Date    CATARACT EXTRACTION, BILATERAL Bilateral     CLOSED REDUCTION ELBOW DISLOCATION      CORNEAL TRANSPLANT      WISDOM TOOTH EXTRACTION         Family History   Problem Relation Age of Onset    Atrial fibrillation Mother     Stroke Mother     Hypertension Mother     Supraventricular tachycardia Mother     Rheum arthritis Mother     Heart attack Father     Heart disease Father     Cancer Neg Hx     Diabetes Neg Hx     Thyroid disease Neg Hx          Medications have been verified  Objective   /72   Pulse 96   Temp 98 2 °F (36 8 °C) (Tympanic)   Resp 18   Ht 5' 6" (1 676 m)   Wt 88 kg (194 lb)   SpO2 98%   BMI 31 31 kg/m²        Physical Exam     Physical Exam   Constitutional: She appears well-developed and well-nourished  No distress  HENT:   Head: Normocephalic and atraumatic  Right Ear: Hearing, tympanic membrane, external ear and ear canal normal    Left Ear: Hearing, tympanic membrane, external ear and ear canal normal    Nose: Mucosal edema and rhinorrhea present  Eyes: Conjunctivae are normal  Right eye exhibits no discharge  Left eye exhibits no discharge  Neck: Normal range of motion  Neck supple  Cardiovascular: Normal rate, regular rhythm, normal heart sounds and intact distal pulses  Pulmonary/Chest: Effort normal and breath sounds normal  No respiratory distress  She has no wheezes  She has no rales  Lymphadenopathy:     She has no cervical adenopathy  Skin: Skin is warm  No rash noted  She is not diaphoretic  Nursing note and vitals reviewed

## 2019-01-26 NOTE — PATIENT INSTRUCTIONS
Take medications as directed  Drink plenty of fluids  Follow up with family doctor this week  Go to ER immediately if new or worsening symptoms occur  Sinusitis   WHAT YOU NEED TO KNOW:   Sinusitis is inflammation or infection of your sinuses  It is most often caused by a virus  Acute sinusitis may last up to 12 weeks  Chronic sinusitis lasts longer than 12 weeks  Recurrent sinusitis means you have 4 or more times in 1 year  DISCHARGE INSTRUCTIONS:   Return to the emergency department if:   · Your eye and eyelid are red, swollen, and painful  · You cannot open your eye  · You have vision changes, such as double vision  · Your eyeball bulges out or you cannot move your eye  · You are more sleepy than normal, or you notice changes in your ability to think, move, or talk  · You have a stiff neck, a fever, or a bad headache  · You have swelling of your forehead or scalp  Contact your healthcare provider if:   · Your symptoms do not improve after 3 days  · Your symptoms do not go away after 10 days  · You have nausea and are vomiting  · Your nose is bleeding  · You have questions or concerns about your condition or care  Medicines: Your symptoms may go away on their own  Your healthcare provider may recommend watchful waiting for up to 10 days before starting antibiotics  You may  need any of the following:  · Acetaminophen  decreases pain and fever  It is available without a doctor's order  Ask how much to take and how often to take it  Follow directions  Read the labels of all other medicines you are using to see if they also contain acetaminophen, or ask your doctor or pharmacist  Acetaminophen can cause liver damage if not taken correctly  Do not use more than 4 grams (4,000 milligrams) total of acetaminophen in one day  · NSAIDs , such as ibuprofen, help decrease swelling, pain, and fever  This medicine is available with or without a doctor's order   NSAIDs can cause stomach bleeding or kidney problems in certain people  If you take blood thinner medicine, always ask your healthcare provider if NSAIDs are safe for you  Always read the medicine label and follow directions  · Nasal steroid sprays  may help decrease inflammation in your nose and sinuses  · Decongestants  help reduce swelling and drain mucus in the nose and sinuses  They may help you breathe easier  · Antihistamines  help dry mucus in the nose and relieve sneezing  · Antibiotics  help treat or prevent a bacterial infection  · Take your medicine as directed  Contact your healthcare provider if you think your medicine is not helping or if you have side effects  Tell him or her if you are allergic to any medicine  Keep a list of the medicines, vitamins, and herbs you take  Include the amounts, and when and why you take them  Bring the list or the pill bottles to follow-up visits  Carry your medicine list with you in case of an emergency  Self-care:   · Rinse your sinuses  Use a sinus rinse device to rinse your nasal passages with a saline (salt water) solution or distilled water  Do not use tap water  This will help thin the mucus in your nose and rinse away pollen and dirt  It will also help reduce swelling so you can breathe normally  Ask your healthcare provider how often to do this  · Breathe in steam   Heat a bowl of water until you see steam  Lean over the bowl and make a tent over your head with a large towel  Breathe deeply for about 20 minutes  Be careful not to get too close to the steam or burn yourself  Do this 3 times a day  You can also breathe deeply when you take a hot shower  · Sleep with your head elevated  Place an extra pillow under your head before you go to sleep to help your sinuses drain  · Drink liquids as directed  Ask your healthcare provider how much liquid to drink each day and which liquids are best for you   Liquids will thin the mucus in your nose and help it drain  Avoid drinks that contain alcohol or caffeine  · Do not smoke, and avoid secondhand smoke  Nicotine and other chemicals in cigarettes and cigars can make your symptoms worse  Ask your healthcare provider for information if you currently smoke and need help to quit  E-cigarettes or smokeless tobacco still contain nicotine  Talk to your healthcare provider before you use these products  Prevent the spread of germs that cause sinusitis:  Wash your hands often with soap and water  Wash your hands after you use the bathroom, change a child's diaper, or sneeze  Wash your hands before you prepare or eat food  Follow up with your healthcare provider as directed: You may be referred to an ear, nose, and throat specialist  Write down your questions so you remember to ask them during your visits  © 2017 [employer] Information is for End User's use only and may not be sold, redistributed or otherwise used for commercial purposes  All illustrations and images included in CareNotes® are the copyrighted property of A D A M , Inc  or Reji Devlin  The above information is an  only  It is not intended as medical advice for individual conditions or treatments  Talk to your doctor, nurse or pharmacist before following any medical regimen to see if it is safe and effective for you

## 2019-01-31 ENCOUNTER — CLINICAL SUPPORT (OUTPATIENT)
Dept: BARIATRICS | Facility: CLINIC | Age: 49
End: 2019-01-31

## 2019-01-31 VITALS — HEIGHT: 66 IN | BODY MASS INDEX: 31.05 KG/M2 | WEIGHT: 193.2 LBS

## 2019-01-31 DIAGNOSIS — R63.5 ABNORMAL WEIGHT GAIN: Primary | ICD-10-CM

## 2019-01-31 PROCEDURE — RECHECK

## 2019-02-07 ENCOUNTER — CLINICAL SUPPORT (OUTPATIENT)
Dept: BARIATRICS | Facility: CLINIC | Age: 49
End: 2019-02-07

## 2019-02-07 VITALS — WEIGHT: 192.8 LBS | HEIGHT: 66 IN | BODY MASS INDEX: 30.98 KG/M2

## 2019-02-07 DIAGNOSIS — R63.5 ABNORMAL WEIGHT GAIN: Primary | ICD-10-CM

## 2019-02-07 PROCEDURE — RECHECK

## 2019-02-14 ENCOUNTER — CLINICAL SUPPORT (OUTPATIENT)
Dept: BARIATRICS | Facility: CLINIC | Age: 49
End: 2019-02-14

## 2019-02-14 VITALS — HEIGHT: 66 IN | BODY MASS INDEX: 31.02 KG/M2 | WEIGHT: 193 LBS

## 2019-02-14 DIAGNOSIS — R63.5 ABNORMAL WEIGHT GAIN: Primary | ICD-10-CM

## 2019-02-14 PROCEDURE — RECHECK

## 2019-02-15 ENCOUNTER — OFFICE VISIT (OUTPATIENT)
Dept: BARIATRICS | Facility: CLINIC | Age: 49
End: 2019-02-15

## 2019-02-15 VITALS — WEIGHT: 191.8 LBS | HEIGHT: 66 IN | BODY MASS INDEX: 30.82 KG/M2

## 2019-02-15 DIAGNOSIS — R63.5 ABNORMAL WEIGHT GAIN: Primary | ICD-10-CM

## 2019-02-15 PROCEDURE — RECHECK

## 2019-02-15 NOTE — PROGRESS NOTES
Weight Management Medical Nutrition Assessment    Is here for f/u  Current wt: 191 8 lbs  Loss of 5 2 lbs x 1 month  States that the first week she followed the plan but then started to do her own thing  Not sure why she did this as she does feel the original plan was realistic  She is not tracking but would like to start as she feels it would benefit her  Praised the good choices she has made such as not eating at [address] and not skipping breakfast  She will focus on her intake at dinner and continue other positive behaviors  Anthropometric Measurements  Start Weight (lbs): 197 0 lbs  Today's weight: 191 8 lbs   Ideal Body Weight (lbs):130 lbs  Goal Weight (lbs):170 lbs    Weight Loss History  Previous weight loss attempts: Exercise  High Protein/Low CHO diets (Atkins, Union, etc )  Self Created Diets (Portion Control, Healthy Food Choices, etc )    Food and Nutrition Related History  Wake up: 5:30 a m  Bed Time: 10:00    Food Recall  Breakfast: meal replacement on weekdays, on weekends 2 egg, slice of american cheese    Snack: cottage cheese/fruit or string cheese/fruit  Lunch: 11:30-12:  Tur[place]/cheese s/w on 647 bread, cheese stick  Snack: hummus/carrots  Dinner:5:30-6:00: 3 oz lean protein, ~1 1/2 cup carb, 1/2 cup veggies, 1 tsp butter  Snack: 1 blue moon    Beverages: water and coffee/tea, alcohol  Volume of beverage intake: 3 cups coffee, 3 bottles water, 1 beer 4x/wk (blue moon)    Weekends: Improved, less seble  Cravings: chips  Trouble area of day:evening    Frequency of Eating out: less often  Food restrictions: n/a  Cooking: self   Food Shopping: self    Physical Activity Intake  Activity:Walking  Frequency:3x/wk, 20-30 minutes  Physical limitations/barriers to exercise: n/a    Estimated Needs  Energy  [employer] Needs:  BMR : 2544  1-2# loss weekly sedentary:  820-1320            1-2# loss weekly lightly active: 7254-5293  Protein:71-89 gm   (1 2-1 5g/kg IBW)  Fluid: 69 oz   (35mL/kg IBW)    Nutrition Diagnosis  Yes; Overweight/obesity  related to Excess energy intake as evidenced by  BMI more than normative standard for age and sex (obesity-grade I 26-30  9)     Nutrition Intervention    Nutrition Prescription  Calories: 6966-2399  Protein:~75 gm  Fluid: ~64 oz    Meal Plan  Breakfast: 200, 27  Snack: 100-150, 5-10  Lunch: 330-420, 18-21  Snack 100-150, 5-10  Dinner: 315-455, 21  Snack: 0-150, 0-10     Nutrition Education:    Healthy Core Manual  Calorie controlled menu  Lean protein food choices  Healthy snack options  Food journaling tips    Nutrition Counseling:  Strategies: meal planning, portion sizes, healthy snack choices, hydration, fiber intake, protein intake, exercise, food journal    Monitoring and Evaluation:  Evaluation criteria:  Energy Intake  Meet protein needs  Maintain adequate hydration  Monitor weekly weight  Meal planning/preparation  Food journal   Decreased portions at mealtimes and snacks  Physical activity     Barriers to learning:none  Readiness to change: Action  Comprehension: very good  Expected Compliance: good

## 2019-02-28 ENCOUNTER — CLINICAL SUPPORT (OUTPATIENT)
Dept: BARIATRICS | Facility: CLINIC | Age: 49
End: 2019-02-28

## 2019-02-28 VITALS — HEIGHT: 66 IN | BODY MASS INDEX: 31.02 KG/M2 | WEIGHT: 193 LBS

## 2019-02-28 DIAGNOSIS — R63.5 ABNORMAL WEIGHT GAIN: Primary | ICD-10-CM

## 2019-02-28 PROCEDURE — RECHECK

## 2019-03-07 ENCOUNTER — CLINICAL SUPPORT (OUTPATIENT)
Dept: BARIATRICS | Facility: CLINIC | Age: 49
End: 2019-03-07

## 2019-03-07 VITALS — WEIGHT: 188.6 LBS | HEIGHT: 66 IN | BODY MASS INDEX: 30.31 KG/M2

## 2019-03-07 DIAGNOSIS — R63.5 ABNORMAL WEIGHT GAIN: Primary | ICD-10-CM

## 2019-03-07 PROCEDURE — RECHECK

## 2019-03-11 NOTE — PROGRESS NOTES
Weight Management Medical Nutrition Assessment    Is here for f/u  Current wt: 188 7  lbs  Loss of 8 3  lbs x 2 months  Paying attention to what she is eating and packing her lunch daily which was her goal  Not tracking due to "laziness " Benefits of food logging discussed, especially as she is consuming larger amounts of carbs prior to and at dinner  Encouraged a 7 day challenge of logging everything so that she can see where she can make improvements & she may find that she sees better results  Has started walking at work which she is happy about  Has a gym membership to the [employer] but reports this is not convenient for her and she is considering going to the [employer]  Anthropometric Measurements  Start Weight (lbs): 197 0 lbs  Today's weight:  188 7 lbs   Ideal Body Weight (lbs):130 lbs  Goal Weight (lbs):170 lbs    Weight Loss History  Previous weight loss attempts: Exercise  High Protein/Low CHO diets (Atkins, Union, etc )  Self Created Diets (Portion Control, Healthy Food Choices, etc )    Food and Nutrition Related History  Wake up: 5:30 a m     Bed Time: 10:00    Food Recall  Breakfast: bar OR kashi cereal, 2% milk OR weekends 2 egg, slice of american cheese, whole grain english muffin    Snack: cottage cheese/fruit or string cheese/fruit or carrots/hummus or greek yogurt/fruit  Lunch: 11:30-12:  Salad w/1/2 cup edammame, hard boiled egg, [place]vor[place] dressing  bread, deli turkey, american cheese, travis OR meal replacement  Snack:as above   Prior to dinner serving of ranjit chips  Dinner:5:30-6:00: 3 oz lean protein, ~1  cup carb, 1/2 cup veggies, 1 tsp butter  Snack: 1 elaina artois    Beverages: water and coffee/tea, alcohol  Volume of beverage intake: 3 cups coffee, 3 bottles water, 1 beer 4x/wk (blue moon)    Weekends: Improved, less seble  Cravings: chips  Trouble area of day:evening>>>better    Frequency of Eating out: less often  Food restrictions: n/a  Cooking: self   Food Shopping: self    Physical Activity Intake  Activity:Walking  Frequency:3x/wk, 20-30 minutes  Physical limitations/barriers to exercise: n/a    Estimated Needs  Energy  [employer] Needs: BMR : 5444   1-2# loss weekly sedentary:  820-1320           1-2# loss weekly lightly active: 1682-5186  Protein:71-89 gm   (1 2-1 5g/kg IBW)  Fluid: 69 oz   (35mL/kg IBW)    Nutrition Diagnosis  Yes; Overweight/obesity  related to Excess energy intake as evidenced by  BMI more than normative standard for age and sex (obesity-grade I 26-30  9)     Nutrition Intervention    Nutrition Prescription  Calories: 4408-6588  Protein:~75 gm  Fluid: ~64 oz    Meal Plan  Breakfast: 200, 27  Snack: 100-150, 5-10  Lunch: 330-420, 18-21  Snack 100-150, 5-10  Dinner: 315-455, 21  Snack: 0-150, 0-10     Nutrition Education:    Healthy Core Manual  Calorie controlled menu  Lean protein food choices  Healthy snack options  Food journaling tips    Nutrition Counseling:  Strategies: meal planning, portion sizes, healthy snack choices, hydration, fiber intake, protein intake, exercise, food journal    Monitoring and Evaluation:  Evaluation criteria:  Energy Intake  Meet protein needs  Maintain adequate hydration  Monitor weekly weight  Meal planning/preparation  Food journal   Decreased portions at mealtimes and snacks  Physical activity     Barriers to learning:none  Readiness to change: Action  Comprehension: very good  Expected Compliance: good

## 2019-03-12 ENCOUNTER — OFFICE VISIT (OUTPATIENT)
Dept: BARIATRICS | Facility: CLINIC | Age: 49
End: 2019-03-12

## 2019-03-12 VITALS — HEIGHT: 66 IN | BODY MASS INDEX: 30.33 KG/M2 | WEIGHT: 188.7 LBS

## 2019-03-12 DIAGNOSIS — R63.5 ABNORMAL WEIGHT GAIN: Primary | ICD-10-CM

## 2019-03-12 PROCEDURE — RECHECK

## 2019-03-14 ENCOUNTER — CLINICAL SUPPORT (OUTPATIENT)
Dept: BARIATRICS | Facility: CLINIC | Age: 49
End: 2019-03-14

## 2019-03-14 VITALS — WEIGHT: 187.8 LBS | BODY MASS INDEX: 30.18 KG/M2 | HEIGHT: 66 IN

## 2019-03-14 DIAGNOSIS — R63.5 ABNORMAL WEIGHT GAIN: Primary | ICD-10-CM

## 2019-03-14 PROCEDURE — RECHECK

## 2019-03-21 ENCOUNTER — CLINICAL SUPPORT (OUTPATIENT)
Dept: BARIATRICS | Facility: CLINIC | Age: 49
End: 2019-03-21

## 2019-03-21 VITALS — BODY MASS INDEX: 30.44 KG/M2 | WEIGHT: 189.4 LBS | HEIGHT: 66 IN

## 2019-03-21 DIAGNOSIS — R63.5 ABNORMAL WEIGHT GAIN: Primary | ICD-10-CM

## 2019-03-21 PROCEDURE — RECHECK

## 2019-04-04 ENCOUNTER — CLINICAL SUPPORT (OUTPATIENT)
Dept: BARIATRICS | Facility: CLINIC | Age: 49
End: 2019-04-04

## 2019-04-04 VITALS — HEIGHT: 66 IN | WEIGHT: 188.2 LBS | BODY MASS INDEX: 30.25 KG/M2

## 2019-04-04 DIAGNOSIS — R63.5 ABNORMAL WEIGHT GAIN: Primary | ICD-10-CM

## 2019-04-04 PROCEDURE — RECHECK

## 2019-04-05 ENCOUNTER — OFFICE VISIT (OUTPATIENT)
Dept: SLEEP CENTER | Facility: CLINIC | Age: 49
End: 2019-04-05
Payer: COMMERCIAL

## 2019-04-05 VITALS
WEIGHT: 188 LBS | HEIGHT: 66 IN | BODY MASS INDEX: 30.22 KG/M2 | SYSTOLIC BLOOD PRESSURE: 118 MMHG | HEART RATE: 68 BPM | DIASTOLIC BLOOD PRESSURE: 68 MMHG

## 2019-04-05 DIAGNOSIS — G47.33 OBSTRUCTIVE SLEEP APNEA: Primary | ICD-10-CM

## 2019-04-05 DIAGNOSIS — G25.81 RESTLESS LEG SYNDROME: ICD-10-CM

## 2019-04-05 DIAGNOSIS — E66.9 OBESITY (BMI 30-39.9): ICD-10-CM

## 2019-04-05 DIAGNOSIS — F41.9 ANXIETY: ICD-10-CM

## 2019-04-05 DIAGNOSIS — F45.8 AEROPHAGIA: ICD-10-CM

## 2019-04-05 DIAGNOSIS — G47.50 PARASOMNIA, UNSPECIFIED TYPE: ICD-10-CM

## 2019-04-05 PROCEDURE — 99214 OFFICE O/P EST MOD 30 MIN: CPT | Performed by: INTERNAL MEDICINE

## 2019-04-11 ENCOUNTER — CLINICAL SUPPORT (OUTPATIENT)
Dept: BARIATRICS | Facility: CLINIC | Age: 49
End: 2019-04-11

## 2019-04-11 VITALS — HEIGHT: 66 IN | BODY MASS INDEX: 30.05 KG/M2 | WEIGHT: 187 LBS

## 2019-04-11 DIAGNOSIS — R63.5 ABNORMAL WEIGHT GAIN: Primary | ICD-10-CM

## 2019-04-11 PROCEDURE — RECHECK

## 2019-04-18 ENCOUNTER — CLINICAL SUPPORT (OUTPATIENT)
Dept: BARIATRICS | Facility: CLINIC | Age: 49
End: 2019-04-18

## 2019-04-18 VITALS — WEIGHT: 190 LBS | HEIGHT: 66 IN | BODY MASS INDEX: 30.53 KG/M2

## 2019-04-18 DIAGNOSIS — R63.5 ABNORMAL WEIGHT GAIN: Primary | ICD-10-CM

## 2019-04-18 PROCEDURE — RECHECK

## 2019-04-25 ENCOUNTER — CLINICAL SUPPORT (OUTPATIENT)
Dept: BARIATRICS | Facility: CLINIC | Age: 49
End: 2019-04-25

## 2019-04-25 VITALS — BODY MASS INDEX: 30.25 KG/M2 | HEIGHT: 66 IN | WEIGHT: 188.2 LBS

## 2019-04-25 DIAGNOSIS — R63.5 ABNORMAL WEIGHT GAIN: Primary | ICD-10-CM

## 2019-04-25 PROCEDURE — RECHECK

## 2019-05-02 ENCOUNTER — CLINICAL SUPPORT (OUTPATIENT)
Dept: BARIATRICS | Facility: CLINIC | Age: 49
End: 2019-05-02

## 2019-05-02 VITALS — WEIGHT: 187.2 LBS | BODY MASS INDEX: 30.08 KG/M2 | HEIGHT: 66 IN

## 2019-05-02 DIAGNOSIS — R63.5 ABNORMAL WEIGHT GAIN: Primary | ICD-10-CM

## 2019-05-02 PROCEDURE — RECHECK

## 2019-05-13 ENCOUNTER — OFFICE VISIT (OUTPATIENT)
Dept: BARIATRICS | Facility: CLINIC | Age: 49
End: 2019-05-13
Payer: COMMERCIAL

## 2019-05-13 VITALS
HEART RATE: 76 BPM | RESPIRATION RATE: 14 BRPM | BODY MASS INDEX: 30.04 KG/M2 | SYSTOLIC BLOOD PRESSURE: 116 MMHG | TEMPERATURE: 98.4 F | WEIGHT: 186.9 LBS | HEIGHT: 66 IN | DIASTOLIC BLOOD PRESSURE: 70 MMHG

## 2019-05-13 DIAGNOSIS — E78.5 DYSLIPIDEMIA: ICD-10-CM

## 2019-05-13 DIAGNOSIS — R73.01 IMPAIRED FASTING GLUCOSE: ICD-10-CM

## 2019-05-13 DIAGNOSIS — E66.9 CLASS 1 OBESITY: ICD-10-CM

## 2019-05-13 DIAGNOSIS — R63.5 ABNORMAL WEIGHT GAIN: Primary | ICD-10-CM

## 2019-05-13 DIAGNOSIS — I10 ESSENTIAL HYPERTENSION: ICD-10-CM

## 2019-05-13 DIAGNOSIS — E03.9 HYPOTHYROIDISM, UNSPECIFIED TYPE: ICD-10-CM

## 2019-05-13 PROCEDURE — 99214 OFFICE O/P EST MOD 30 MIN: CPT | Performed by: PHYSICIAN ASSISTANT

## 2019-05-17 ENCOUNTER — APPOINTMENT (OUTPATIENT)
Dept: LAB | Facility: MEDICAL CENTER | Age: 49
End: 2019-05-17
Payer: COMMERCIAL

## 2019-05-17 ENCOUNTER — OFFICE VISIT (OUTPATIENT)
Dept: BARIATRICS | Facility: CLINIC | Age: 49
End: 2019-05-17

## 2019-05-17 VITALS — WEIGHT: 186.4 LBS | HEIGHT: 66 IN | BODY MASS INDEX: 29.96 KG/M2

## 2019-05-17 DIAGNOSIS — E03.9 HYPOTHYROIDISM, UNSPECIFIED TYPE: ICD-10-CM

## 2019-05-17 DIAGNOSIS — R73.01 IMPAIRED FASTING GLUCOSE: ICD-10-CM

## 2019-05-17 DIAGNOSIS — E78.5 DYSLIPIDEMIA: ICD-10-CM

## 2019-05-17 DIAGNOSIS — R63.5 ABNORMAL WEIGHT GAIN: Primary | ICD-10-CM

## 2019-05-17 LAB
CHOLEST SERPL-MCNC: 200 MG/DL (ref 50–200)
EST. AVERAGE GLUCOSE BLD GHB EST-MCNC: 123 MG/DL
HBA1C MFR BLD: 5.9 % (ref 4.2–6.3)
HDLC SERPL-MCNC: 41 MG/DL (ref 40–60)
LDLC SERPL CALC-MCNC: 117 MG/DL (ref 0–100)
TRIGL SERPL-MCNC: 211 MG/DL
TSH SERPL DL<=0.05 MIU/L-ACNC: 1.87 UIU/ML (ref 0.36–3.74)

## 2019-05-17 PROCEDURE — RECHECK: Performed by: DIETITIAN, REGISTERED

## 2019-05-17 PROCEDURE — 84443 ASSAY THYROID STIM HORMONE: CPT

## 2019-05-17 PROCEDURE — 83036 HEMOGLOBIN GLYCOSYLATED A1C: CPT

## 2019-05-17 PROCEDURE — 36415 COLL VENOUS BLD VENIPUNCTURE: CPT

## 2019-05-17 PROCEDURE — 80061 LIPID PANEL: CPT

## 2019-06-04 ENCOUNTER — TELEPHONE (OUTPATIENT)
Dept: BARIATRICS | Facility: CLINIC | Age: 49
End: 2019-06-04

## 2019-06-14 ENCOUNTER — OFFICE VISIT (OUTPATIENT)
Dept: BARIATRICS | Facility: CLINIC | Age: 49
End: 2019-06-14

## 2019-06-14 VITALS — BODY MASS INDEX: 30.18 KG/M2 | WEIGHT: 187.8 LBS | HEIGHT: 66 IN

## 2019-06-14 DIAGNOSIS — R63.5 ABNORMAL WEIGHT GAIN: Primary | ICD-10-CM

## 2019-06-14 PROCEDURE — RECHECK

## 2019-06-21 DIAGNOSIS — F41.1 GENERALIZED ANXIETY DISORDER: ICD-10-CM

## 2019-06-23 RX ORDER — FLUOXETINE HYDROCHLORIDE 20 MG/1
CAPSULE ORAL
Qty: 90 CAPSULE | Refills: 0 | Status: SHIPPED | OUTPATIENT
Start: 2019-06-23 | End: 2019-09-25 | Stop reason: SDUPTHER

## 2019-07-02 ENCOUNTER — CLINICAL SUPPORT (OUTPATIENT)
Dept: BARIATRICS | Facility: CLINIC | Age: 49
End: 2019-07-02

## 2019-07-02 VITALS — BODY MASS INDEX: 30.41 KG/M2 | HEIGHT: 66 IN | WEIGHT: 189.2 LBS

## 2019-07-02 DIAGNOSIS — R63.5 ABNORMAL WEIGHT GAIN: Primary | ICD-10-CM

## 2019-07-02 PROCEDURE — RECHECK

## 2019-08-05 ENCOUNTER — OFFICE VISIT (OUTPATIENT)
Dept: BARIATRICS | Facility: CLINIC | Age: 49
End: 2019-08-05
Payer: COMMERCIAL

## 2019-08-05 VITALS
SYSTOLIC BLOOD PRESSURE: 100 MMHG | HEIGHT: 66 IN | HEART RATE: 82 BPM | TEMPERATURE: 98.7 F | WEIGHT: 190.3 LBS | RESPIRATION RATE: 18 BRPM | DIASTOLIC BLOOD PRESSURE: 62 MMHG | BODY MASS INDEX: 30.58 KG/M2

## 2019-08-05 DIAGNOSIS — R63.5 ABNORMAL WEIGHT GAIN: Primary | ICD-10-CM

## 2019-08-05 DIAGNOSIS — I10 ESSENTIAL HYPERTENSION: ICD-10-CM

## 2019-08-05 DIAGNOSIS — E66.9 CLASS 1 OBESITY: ICD-10-CM

## 2019-08-05 DIAGNOSIS — R73.01 IMPAIRED FASTING GLUCOSE: ICD-10-CM

## 2019-08-05 PROCEDURE — 99214 OFFICE O/P EST MOD 30 MIN: CPT | Performed by: PHYSICIAN ASSISTANT

## 2019-08-05 PROCEDURE — 3074F SYST BP LT 130 MM HG: CPT | Performed by: PHYSICIAN ASSISTANT

## 2019-08-05 NOTE — PROGRESS NOTES
Assessment/Plan:    Class 1 obesity  -Patient is pursuing [facility] since she completed [facility]  -Initial weight loss goal of 5-10% weight loss for improved health- met and then regained  -Has not been practicing interval eating, food logging or performing and formal exercise routine  She has been skipping meals and snacks and not planning these out  Reports she is feeling somewhat unmotivated to make changes  Is on Prozac for depression for past 6 years, managed by PCP  She may benefit from dose increase vs switching to SSRI  Caution with Wellbutrin as she has eye condition that may increase her risk of developing glaucoma  -Will continue in [place] [address], may consider switching to weekly option    Initial:  198 9  Current: 190 3 lbs   Change: - 8 6 lbs (4%)  Goal: 170 lbs    Essential hypertension  -stable on HCTZ  -continued weight loss may improve this condition  -denies symptoms of low BP    Impaired fasting glucose  -hgbA1c 5 9  -dietary/lifestyle changes  -can consider metformin    Goals:    Food log (ie ) www myfitnesspal com,sparkpeople  com,loseit com,calorieking  com,etc    No sugary beverages  At least 64oz of water daily  Increase physical activity by 10 minutes daily  Gradually increase physical activity to a goal of 5 days per week for 30 minutes of MODERATE intensity PLUS 2 days per week of FULL BODY resistance training  7178-0411 calorie and flex 1400 on exercise days  Consider switching antidepressant - discuss with family physician  May consider another SSRI (Lexapro)  Trial meal replacement instead of skipping meals  Make a schedule for meals and snacks - planning ahead     Follow up in approximately 2 months with Non-Surgical Physician/Advanced Practitioner  25 minute visit, >50% face-to-face time spent counseling patient on diet behavior and exercise modification for weight loss       Diagnoses and all orders for this visit:    Abnormal weight gain  Comments:  see plan under class 1 obesity    Class 1 obesity    Essential hypertension    Impaired fasting glucose          Subjective:   Chief Complaint   Patient presents with    Follow-up     3mo fu        Patient ID: Franck Real  is a 52 y o  female with excess weight/obesity here to pursue weight managment  Patient is pursuing [facility]  HPI Presents for MWM follow up  Struggling with consistent dietary and lifestyle changes  Wants to make changes but is lacking motivation  Has considered stopping Prozac but feels she likely still needs to be on it  Discussed setting small goals to get her back on track starting with making a schedule for the week of her meals  She will also purchase meal replacements as these helped in the past and prevented her from skipping  The following portions of the patient's history were reviewed and updated as appropriate: allergies, current medications, past family history, past medical history, past social history, past surgical history and problem list     Review of Systems   Psychiatric/Behavioral:        Hx depression, feeling unmotivated  Denies SI/HI       Objective:    /62 (BP Location: Left arm, Patient Position: Sitting, Cuff Size: Large)   Pulse 82   Temp 98 7 °F (37 1 °C) (Tympanic)   Resp 18   Ht 5' 6" (1 676 m)   Wt 86 3 kg (190 lb 4 8 oz)   BMI 30 72 kg/m²      Physical Exam   Constitutional: She is oriented to person, place, and time  She appears well-developed  HENT:   Head: Normocephalic  Pulmonary/Chest: Effort normal    Neurological: She is alert and oriented to person, place, and time  Psychiatric: She has a normal mood and affect  Her behavior is normal  Thought content normal    Nursing note and vitals reviewed

## 2019-08-05 NOTE — PATIENT INSTRUCTIONS
Goals: Food log (ie ) www myfitnesspal com,sparkpeople  com,loseit com,calorieking  com,etc    No sugary beverages  At least 64oz of water daily  Increase physical activity by 10 minutes daily  Gradually increase physical activity to a goal of 5 days per week for 30 minutes of MODERATE intensity PLUS 2 days per week of FULL BODY resistance training  5602-4190 calorie and flex 1400 on exercise days  Consider switching antidepressant - discuss with family physician   May consider another SSRI (Lexapro)  Trial meal replacement instead of skipping meals  Make a schedule for meals and snacks - planning ahead

## 2019-08-26 NOTE — PROGRESS NOTES
Weight Management Medical Nutrition Assessment     Donnia Barthel is here for f/u  Today's weight: 191 4 #  Last seen by PA ~3 wks ago  Overall loss of 5 6# in the last 7 1/2 months  Not planning or tracking  Skipping breakfast but realizes the importance of not doing so  Overall planning continues to be the key issue  Readdressed motivation level  She intends to resume walking today  Discuss option of using meal replacements if she feels she is going to skip a meal  She states that she does have these but doesn't want to go through the effort of making  Options for retail already prepared shakes discussed  Lastly, she is considering discussing current antidepressant with her physician  Anthropometric Measurements  Start Weight (lbs): 197 0 lbs  Today's weight: 191 4 lbs  % TBW change: 2 9%   Ideal Body Weight (lbs):130 lbs  Goal Weight (lbs):170 lbs     Weight Loss History  Previous weight loss attempts: Exercise  High Protein/Low CHO diets (Atkins, Union, etc )  Self Created Diets (Portion Control, Healthy Food Choices, etc )     Food and Nutrition Related History  Wake up: 5:30 a m         Bed Time: 10:00     Food Recall  Breakfast: skip OR special k protein w/flax  Snack: 9:30   greek yogurt/fruit OR skip   Lunch: 11:30-12:  seble tuna hoagie   Snack:  skip  Prior to dinner might have hot dog at baseball game  Dinner:5:30-6:00: ~6 oz protein, carb, minimal veggies, 1 tsp butter  Snack: skip     Beverages: water and coffee/tea, alcohol  Volume of beverage intake: 3 cups coffee, ~68 oz water, 1 beer 4x/wk (blue moon)     Weekends: Improved, less seble  Cravings: chips  Trouble area of day:evening>>>better     Frequency of Eating out: 1-2x/wk  Food restrictions: n/a  Cooking: self   Food Shopping: self     Physical Activity Intake  Activity: none   Frequency:n/a  Physical limitations/barriers to exercise: n/a     Estimated Needs  Energy  [place]evue Indirect Calorimeter REE: 1510           1-1 5 lb Weight loss without exercise: 6660-4238 calories          1-1 5 lb Weight loss with exercise: 4982-2699 calories     Protein:71-89 gm   (1 2-1 5g/kg IBW)  Fluid: 69 oz   (35mL/kg IBW)     Nutrition Diagnosis  Yes; Overweight/obesity  related to Excess energy intake as evidenced by  BMI more than normative standard for age and sex (obesity-grade I 26-30  9)  Nutrition Intervention     Nutrition Prescription  Calories: 6640-8414 calorie and flex 1400 on walk for >30 minutes   Protein:~75 gm  Fluid: ~64 oz     Meal Plan  Breakfast: 200, 27  Snack: 100-150, 5-10  Lunch: 330-420, 18-21  Snack 100-150, 5-10  Dinner: 315-455, 21  Snack: 0-150, 0-10      Nutrition Education:    Healthy Core Manual  Calorie controlled menu  Lean protein food choices  Healthy snack options  Food journaling tips  Motivation  Meal planning     Nutrition Counseling:  Strategies: meal planning, portion sizes, healthy snack choices, hydration, fiber intake, protein intake, exercise, food journal     Monitoring and Evaluation:  Evaluation criteria:  Energy Intake  Meet protein needs  Maintain adequate hydration  Monitor weekly weight  Meal planning/preparation  Food journal   Decreased portions at mealtimes and snacks  Physical activity      Barriers to learning:none  Readiness to change: Action  Comprehension: very good  Expected Compliance:  fair

## 2019-08-27 ENCOUNTER — OFFICE VISIT (OUTPATIENT)
Dept: BARIATRICS | Facility: CLINIC | Age: 49
End: 2019-08-27

## 2019-08-27 VITALS — HEIGHT: 66 IN | WEIGHT: 191.4 LBS | BODY MASS INDEX: 30.76 KG/M2

## 2019-08-27 DIAGNOSIS — R63.5 ABNORMAL WEIGHT GAIN: Primary | ICD-10-CM

## 2019-08-27 PROCEDURE — RECHECK

## 2019-09-19 ENCOUNTER — CLINICAL SUPPORT (OUTPATIENT)
Dept: BARIATRICS | Facility: CLINIC | Age: 49
End: 2019-09-19

## 2019-09-19 VITALS — WEIGHT: 195.2 LBS | HEIGHT: 66 IN | BODY MASS INDEX: 31.37 KG/M2

## 2019-09-19 DIAGNOSIS — R63.5 ABNORMAL WEIGHT GAIN: Primary | ICD-10-CM

## 2019-09-19 PROCEDURE — RECHECK

## 2019-09-25 DIAGNOSIS — E03.9 HYPOTHYROIDISM, UNSPECIFIED TYPE: ICD-10-CM

## 2019-09-25 DIAGNOSIS — F41.1 GENERALIZED ANXIETY DISORDER: ICD-10-CM

## 2019-09-25 DIAGNOSIS — I10 ESSENTIAL HYPERTENSION: Primary | ICD-10-CM

## 2019-09-27 RX ORDER — FLUOXETINE HYDROCHLORIDE 20 MG/1
CAPSULE ORAL
Qty: 90 CAPSULE | Refills: 0 | Status: SHIPPED | OUTPATIENT
Start: 2019-09-27 | End: 2020-01-08 | Stop reason: SDUPTHER

## 2019-09-27 RX ORDER — LEVOTHYROXINE SODIUM 0.05 MG/1
TABLET ORAL
Qty: 90 TABLET | Refills: 0 | Status: SHIPPED | OUTPATIENT
Start: 2019-09-27 | End: 2020-01-08 | Stop reason: SDUPTHER

## 2019-09-27 RX ORDER — HYDROCHLOROTHIAZIDE 50 MG/1
TABLET ORAL
Qty: 90 TABLET | Refills: 0 | Status: SHIPPED | OUTPATIENT
Start: 2019-09-27 | End: 2019-09-30 | Stop reason: SDUPTHER

## 2019-09-30 ENCOUNTER — OFFICE VISIT (OUTPATIENT)
Dept: FAMILY MEDICINE CLINIC | Facility: CLINIC | Age: 49
End: 2019-09-30
Payer: COMMERCIAL

## 2019-09-30 VITALS
HEART RATE: 90 BPM | OXYGEN SATURATION: 98 % | SYSTOLIC BLOOD PRESSURE: 130 MMHG | TEMPERATURE: 98.2 F | HEIGHT: 66 IN | BODY MASS INDEX: 31.34 KG/M2 | DIASTOLIC BLOOD PRESSURE: 80 MMHG | WEIGHT: 195 LBS

## 2019-09-30 DIAGNOSIS — Z12.11 COLON CANCER SCREENING: ICD-10-CM

## 2019-09-30 DIAGNOSIS — L03.113 CELLULITIS OF RIGHT UPPER EXTREMITY: Primary | ICD-10-CM

## 2019-09-30 PROCEDURE — 99213 OFFICE O/P EST LOW 20 MIN: CPT | Performed by: FAMILY MEDICINE

## 2019-09-30 PROCEDURE — 3008F BODY MASS INDEX DOCD: CPT | Performed by: FAMILY MEDICINE

## 2019-09-30 RX ORDER — CEPHALEXIN 500 MG/1
500 CAPSULE ORAL 4 TIMES DAILY
Qty: 28 CAPSULE | Refills: 0 | Status: SHIPPED | OUTPATIENT
Start: 2019-09-30 | End: 2019-10-07

## 2019-09-30 NOTE — PROGRESS NOTES
BMI Counseling: Body mass index is 31 43 kg/m²  The BMI is above normal  Nutrition recommendations include reducing portion sizes

## 2019-09-30 NOTE — PROGRESS NOTES
Assessment/Plan:  Side effect profile of medication reviewed  Recommend return to office if no improvement or worsening symptoms  No problem-specific Assessment & Plan notes found for this encounter  Diagnoses and all orders for this visit:    Cellulitis of right upper extremity  -     cephalexin (KEFLEX) 500 mg capsule; Take 1 capsule (500 mg total) by mouth 4 (four) times a day for 7 days    Colon cancer screening  -     Ambulatory referral to colonoscopy screening; Future          Subjective:      Patient ID: Angie Harris is a 52 y o  female  Patient had a bug bite to the left upper arm  Onset 2 days ago  She has a red rash to the area that is mildly uncomfortable  No fever sweats or chills  The following portions of the patient's history were reviewed and updated as appropriate: allergies, current medications, past family history, past medical history, past social history, past surgical history and problem list     Review of Systems   Constitutional: Negative  HENT: Negative  Eyes: Negative  Respiratory: Negative  Cardiovascular: Negative  Gastrointestinal: Negative  Endocrine: Negative  Genitourinary: Negative  Musculoskeletal: Negative  Skin: Positive for rash  Allergic/Immunologic: Negative  Neurological: Negative  Hematological: Negative  Psychiatric/Behavioral: Negative  Objective:      /80 (BP Location: Left arm, Patient Position: Sitting, Cuff Size: Standard)   Pulse 90   Temp 98 2 °F (36 8 °C) (Tympanic)   Ht 5' 6 04" (1 677 m)   Wt 88 5 kg (195 lb)   LMP  (LMP Unknown)   SpO2 98%   BMI 31 43 kg/m²          Physical Exam   Constitutional: She is oriented to person, place, and time  She appears well-developed and well-nourished  HENT:   Head: Normocephalic and atraumatic  Right Ear: External ear normal  Tympanic membrane is not erythematous and not bulging     Left Ear: External ear normal  Tympanic membrane is not erythematous and not bulging  Nose: Nose normal    Mouth/Throat: Oropharynx is clear and moist and mucous membranes are normal  No oral lesions  No oropharyngeal exudate  Eyes: Conjunctivae and EOM are normal  Right eye exhibits no discharge  Left eye exhibits no discharge  No scleral icterus  Neck: Normal range of motion  Neck supple  No thyromegaly present  Cardiovascular: Normal rate, regular rhythm and normal heart sounds  Exam reveals no gallop and no friction rub  No murmur heard  Pulmonary/Chest: Effort normal  No respiratory distress  She has no wheezes  She has no rales  She exhibits no tenderness  Abdominal: Soft  Bowel sounds are normal  She exhibits no distension and no mass  There is no tenderness  There is no rebound and no guarding  Musculoskeletal: Normal range of motion  She exhibits no edema, tenderness or deformity  Lymphadenopathy:     She has no cervical adenopathy  Neurological: She is alert and oriented to person, place, and time  She has normal reflexes  No cranial nerve deficit  She exhibits normal muscle tone  Coordination normal    Skin: Skin is warm and dry  Rash (Patchy erythematous rash to the left proximal arm just distal to the axilla ) noted  No erythema  No pallor  Psychiatric: She has a normal mood and affect  Her behavior is normal    Vitals reviewed

## 2019-10-11 NOTE — ASSESSMENT & PLAN NOTE
-on HCTZ  -normotensive today, but low normal  Recommend she monitor BP and for sx of low BP, parameters reviewed   Notify provider/PCP if sx occur

## 2019-10-11 NOTE — PROGRESS NOTES
Assessment/Plan:    Class 1 obesity  -Patient is pursuing [facility] since she completed [employer]  -Initial weight loss goal of 5-10% weight loss for improved health- met and then regained  -Caution with Wellbutrin/phentermine/Topamax as she has eye condition that may increase her risk of developing glaucoma  -Caution Belviq as she is on prozac  -Discussed options for Saxenda due to increased cravings in the evenings  She is interested in this  Patient denies personal and family history MEN2 tumors, and medullary thyroid or thyroid carcinoma  Patient denies personal history of pancreatitis  -Common side effects of Saxenda may include: increased heart rate (pulse), headache, low blood sugar, GI/abdominal upset, heartburn, fatigue, and dizziness   -Injection instructions reviewed  -Reviewed benefits of consistent food logging  -Use meal replacement instead of skipping breakfast  -See exercise goals below     Initial:  198 9  Current: 192 2 (+1 9 lbs since 8/5/19,  But down 3 lbs since 9/19/19)  Change: -6 7 lbs (3 5 % TBW)  Goal: 170 lbs    Essential hypertension  -on HCTZ  -normotensive today, but low normal  Recommend she monitor BP and for sx of low BP, parameters reviewed  Notify provider/PCP if sx occur    Impaired fasting glucose  -hgbA1c 5 9  -may improve with lifestyle changes  -Saxenda started    Follow up in approximately 6 weeks with Non-Surgical Physician/Advanced Practitioner  Goals:  Food log (ie ) www myfitnesspal com,sparkpeople  com,loseit com,calorieking  com,etc  baritastic  No sugary beverages  At least 64oz of water daily  Increase physical activity by 10 minutes daily  Gradually increase physical activity to a goal of 5 days per week for 30 minutes of MODERATE intensity PLUS 2 days per week of FULL BODY resistance training   Exercise goals for next visit: 20 minutes of walking + strength training 2 days per week  5-10 servings of fruits and vegetables per day and 25-35 grams of dietary fiber per day, gradually increasing  Use meal replacement instead of skipping breakfast  Monitor blood pressure and notify the provider if consistently around 100/60 or you have symptoms of low blood pressure (lightheadedness/dizziness)    VISIT SAXENDA  COM  INJECT SAXENDA DAILY SUBCUTANEOUSLY  -WEEK 1: 0 6mg daily  -if tolerated WEEK 2: 1 2mg daily  -if tolerated WEEK 3: 1 8mg daily  -if tolerated WEEK 4: 2 4mg daily  -if tolerated WEEK 5: 3mg daily  -IF NAUSEA/VOMITING DEVELOP STOP MEDICATION FOR A FEW DAYS AND DECREASE TO PREVIOUSLY TOLERATED DOSE  STAY HYDRATED  -IF YOU STOP THE MEDICATION FOR 3 DAYS RESTART AT THE LOWEST DOSE (0 6 mg)  -IF YOU DEVELOP SEVERE ABDOMINAL PAIN WHICH MAY RADIATE TO THE BACK, SOMETIMES ASSOCIATED WITH FEVER, AND VOMITING, STOP MEDICATION AND SEEK URGENT CARE AS THIS MAY BE A SIGN OF PANCREATITIS  Diagnoses and all orders for this visit:    Class 1 obesity  -     liraglutide (SAXENDA) injection; Inject 0 6 mg subcutaneously once per day for the first week  Increase in weekly intervals by 0 6 mg until a dose of 3 mg is reached  -     Insulin Pen Needle (NOVOFINE PLUS) 32G X 4 MM MISC; by Does not apply route daily    Essential hypertension  -     liraglutide (SAXENDA) injection; Inject 0 6 mg subcutaneously once per day for the first week  Increase in weekly intervals by 0 6 mg until a dose of 3 mg is reached  -     Insulin Pen Needle (NOVOFINE PLUS) 32G X 4 MM MISC; by Does not apply route daily    Impaired fasting glucose  -     liraglutide (SAXENDA) injection; Inject 0 6 mg subcutaneously once per day for the first week  Increase in weekly intervals by 0 6 mg until a dose of 3 mg is reached  -     Insulin Pen Needle (NOVOFINE PLUS) 32G X 4 MM MISC; by Does not apply route daily    Body mass index 31 0-31 9, adult  -     liraglutide (SAXENDA) injection; Inject 0 6 mg subcutaneously once per day for the first week   Increase in weekly intervals by 0 6 mg until a dose of 3 mg is reached  -     Insulin Pen Needle (NOVOFINE PLUS) 32G X 4 MM MISC; by Does not apply route daily        Subjective:   Chief Complaint   Patient presents with    Follow-up     10wk fu     Patient ID: Tonja Pryor  is a 52 y o  female with excess weight/obesity here to pursue weight management  Patient is pursuing [facility]  HPI  The patient presents for MW follow up  Food logging: measuring out portions, reducing alcohol intake and trying to plan meal more  Has not been food logging  Skipping breakfast, trying to add a meal replacement instead of skipping  Increased appetite/cravings: +cravings at night; trying to minimize eating chips at night and trying not to eat two hours before bedtime  Fruit/Vegetable servings: 2 vegetables, 1 fruit  Exercise: walks a short distance at work, but no formal exercise  Hydration: 4 bottles    The following portions of the patient's history were reviewed and updated as appropriate: allergies, current medications, past family history, past medical history, past social history, past surgical history and problem list     Review of Systems   Respiratory: Negative  Cardiovascular: Negative  Gastrointestinal: Negative  Neurological: Negative for dizziness and light-headedness  Psychiatric/Behavioral:        Denies concerns with mood  Objective:    /78 (BP Location: Left arm, Patient Position: Sitting, Cuff Size: Large)   Pulse 72   Temp 97 6 °F (36 4 °C) (Tympanic)   Resp 17   Ht 5' 6" (1 676 m)   Wt 87 2 kg (192 lb 3 2 oz)   LMP  (LMP Unknown)   BMI 31 02 kg/m²      Physical Exam   Nursing note and vitals reviewed  Constitutional   General appearance: Abnormal   well developed and obese  Eyes No conjunctival pallor  Ears, Nose, Mouth, and Throat Oral mucosa moist    Pulmonary   Respiratory effort: No increased work of breathing or signs of respiratory distress      Auscultation of lungs: Clear to auscultation, equal breath sounds bilaterally, no wheezes, no rales, no rhonci  Cardiovascular   Auscultation of heart: Normal rate and rhythm, normal S1 and S2, without murmurs  Examination of extremities for edema and/or varicosities: Normal   no edema  Abdomen   Abdomen: Abnormal   The abdomen was obese  Bowel sounds were normal  The abdomen was soft and nontender     Musculoskeletal   Gait and station: Normal     Psychiatric   Orientation to person, place and time: Normal     Affect: appropriate

## 2019-10-11 NOTE — ASSESSMENT & PLAN NOTE
-Patient is pursuing [facility] since she completed [facility]vention [facility]  -Initial weight loss goal of 5-10% weight loss for improved health- met and then regained  -Caution with Wellbutrin/phentermine/Topamax as she has eye condition that may increase her risk of developing glaucoma  -Caution Belviq as she is on prozac  -Discussed options for Saxenda due to increased cravings in the evenings  She is interested in this  Patient denies personal and family history MEN2 tumors, and medullary thyroid or thyroid carcinoma  Patient denies personal history of pancreatitis     -Common side effects of Saxenda may include: increased heart rate (pulse), headache, low blood sugar, GI/abdominal upset, heartburn, fatigue, and dizziness   -Injection instructions reviewed  -Reviewed benefits of consistent food logging  -Use meal replacement instead of skipping breakfast  -See exercise goals below     Initial:  198 9  Current: 192 2 (+1 9 lbs since 8/5/19,  But down 3 lbs since 9/19/19)  Change: -6 7 lbs (3 5 % TBW)  Goal: 170 lbs

## 2019-10-16 ENCOUNTER — OFFICE VISIT (OUTPATIENT)
Dept: BARIATRICS | Facility: CLINIC | Age: 49
End: 2019-10-16
Payer: COMMERCIAL

## 2019-10-16 ENCOUNTER — TELEPHONE (OUTPATIENT)
Dept: GASTROENTEROLOGY | Facility: MEDICAL CENTER | Age: 49
End: 2019-10-16

## 2019-10-16 VITALS
DIASTOLIC BLOOD PRESSURE: 78 MMHG | TEMPERATURE: 97.6 F | BODY MASS INDEX: 30.89 KG/M2 | WEIGHT: 192.2 LBS | SYSTOLIC BLOOD PRESSURE: 102 MMHG | HEART RATE: 72 BPM | RESPIRATION RATE: 17 BRPM | HEIGHT: 66 IN

## 2019-10-16 DIAGNOSIS — E66.9 CLASS 1 OBESITY: Primary | ICD-10-CM

## 2019-10-16 DIAGNOSIS — R73.01 IMPAIRED FASTING GLUCOSE: ICD-10-CM

## 2019-10-16 DIAGNOSIS — I10 ESSENTIAL HYPERTENSION: ICD-10-CM

## 2019-10-16 PROCEDURE — 3074F SYST BP LT 130 MM HG: CPT | Performed by: PHYSICIAN ASSISTANT

## 2019-10-16 PROCEDURE — 99214 OFFICE O/P EST MOD 30 MIN: CPT | Performed by: PHYSICIAN ASSISTANT

## 2019-10-16 PROCEDURE — 3078F DIAST BP <80 MM HG: CPT | Performed by: PHYSICIAN ASSISTANT

## 2019-10-16 NOTE — PATIENT INSTRUCTIONS
Goals: Food log (ie ) www myfitnesspal com,sparkpeople  com,loseit com,calorieking  com,etc  baritastic  No sugary beverages  At least 64oz of water daily  Increase physical activity by 10 minutes daily  Gradually increase physical activity to a goal of 5 days per week for 30 minutes of MODERATE intensity PLUS 2 days per week of FULL BODY resistance training  Exercise goals for next visit: 20 minutes of walking + strength training 2 days per week  5-10 servings of fruits and vegetables per day and 25-35 grams of dietary fiber per day, gradually increasing  Use meal replacement instead of skipping breakfast  Monitor blood pressure and notify the provider if consistently around 100/60 or you have symptoms of low blood pressure (lightheadedness/dizziness)    VISIT SAXENDA  COM  INJECT SAXENDA DAILY SUBCUTANEOUSLY  -WEEK 1: 0 6mg daily  -if tolerated WEEK 2: 1 2mg daily  -if tolerated WEEK 3: 1 8mg daily  -if tolerated WEEK 4: 2 4mg daily  -if tolerated WEEK 5: 3mg daily  -IF NAUSEA/VOMITING DEVELOP STOP MEDICATION FOR A FEW DAYS AND DECREASE TO PREVIOUSLY TOLERATED DOSE  STAY HYDRATED  -IF YOU STOP THE MEDICATION FOR 3 DAYS RESTART AT THE LOWEST DOSE (0 6 mg)  -IF YOU DEVELOP SEVERE ABDOMINAL PAIN WHICH MAY RADIATE TO THE BACK, SOMETIMES ASSOCIATED WITH FEVER, AND VOMITING, STOP MEDICATION AND SEEK URGENT CARE AS THIS MAY BE A SIGN OF PANCREATITIS

## 2019-10-16 NOTE — TELEPHONE ENCOUNTER
Went over [place] with pt she would like to wait to schedule until may and will call us when she is ready

## 2020-01-02 ENCOUNTER — OFFICE VISIT (OUTPATIENT)
Dept: PHYSICAL THERAPY | Facility: CLINIC | Age: 50
End: 2020-01-02
Payer: COMMERCIAL

## 2020-01-02 DIAGNOSIS — M25.512 LEFT SHOULDER PAIN, UNSPECIFIED CHRONICITY: Primary | ICD-10-CM

## 2020-01-02 PROCEDURE — 97110 THERAPEUTIC EXERCISES: CPT | Performed by: PHYSICAL THERAPIST

## 2020-01-02 PROCEDURE — 97162 PT EVAL MOD COMPLEX 30 MIN: CPT | Performed by: PHYSICAL THERAPIST

## 2020-01-02 PROCEDURE — 97140 MANUAL THERAPY 1/> REGIONS: CPT | Performed by: PHYSICAL THERAPIST

## 2020-01-03 NOTE — PROGRESS NOTES
PT Evaluation     Today's date: 1/3/2020  Patient name: Gunnar Murry  : 1970  MRN: [id number]  Referring provider: Charo Auguste PT  Dx:   Encounter Diagnosis     ICD-10-CM    1  Left shoulder pain, unspecified chronicity M25 [id number]                   Assessment  Assessment details: Gunnar Murry was evaluated on 2020 under Direct Access for chana shoulder pain  Patient presents with postural dysfunction and left GH A/PROM limitations     No further referral or diagnostic testing appears necessary at this time based upon examination findings  Recommended treatment includes manual therapy, NMR, and therapeutic exercises, 2 x/week for 4-8 weeks  Under direct access, the patient can be treated for 30 days without a prescription from the physician  If you agree to the patient's plan of care, please sign and return  Please do not hesitate to contact me at [phone]  Thank you for allowing me to participate in [facility] care  Understanding of Dx/Px/POC: good   Prognosis: good    Goals  STG - 4 weeks  PROM increased by 50%  AROM increased by 50%    LTG in 8 weeks  No pain with lying on her left side  No pain with ADL's    Plan  Patient would benefit from: skilled physical therapy  Referral necessary: No        Subjective Evaluation    History of Present Illness  Mechanism of injury: History of Present Illness  Mechanism of injury: Patient reports an onset of left shoulder pain and decreased ROM  Pain increases with reaching, reaching behind her back, dressing, and sleeping at night  Tenderness is noted over the lateral upper arm  Denies UE numbness or tingling  Denies trauma, fall or MVA  Pain is constant and is worse with lying on her left side  She is taking ibuprofen PRN             Not a recurrent problem   Quality of life: good    Pain  Current pain rating: 3  At best pain rating: 3  At worst pain ratin  Quality: dull ache and sharp        Diagnostic Tests  No diagnostic tests performed  Treatments  No previous or current treatments  Patient Goals  Patient goals for therapy: decreased pain, increased motion and increased strength              Objective     General Comments:      Shoulder Comments   Scapular dyskinesia noted on left  AROM elevatoin 120 with pain  PROM - severe limitation in abduction and ER   PROM - GH abd 80, ER 60 with pain  Inferior and anterior GH joint hypomobility  Severe upper thoracic hypomobility into extension, forward head and rounded shoulders             Precautions: none      Manual  1/2                         STM to axilla, GH inferior mobs ND                                                       Exercise Diary                           Upper thoracic extension over 1/2 foam 10                                                                                                                                                                                                                                                          Modalities

## 2020-01-07 ENCOUNTER — OFFICE VISIT (OUTPATIENT)
Dept: PHYSICAL THERAPY | Facility: CLINIC | Age: 50
End: 2020-01-07
Payer: COMMERCIAL

## 2020-01-07 DIAGNOSIS — M25.512 LEFT SHOULDER PAIN, UNSPECIFIED CHRONICITY: Primary | ICD-10-CM

## 2020-01-07 PROCEDURE — 97110 THERAPEUTIC EXERCISES: CPT | Performed by: PHYSICAL THERAPIST

## 2020-01-07 PROCEDURE — 97140 MANUAL THERAPY 1/> REGIONS: CPT | Performed by: PHYSICAL THERAPIST

## 2020-01-07 PROCEDURE — 97112 NEUROMUSCULAR REEDUCATION: CPT | Performed by: PHYSICAL THERAPIST

## 2020-01-07 NOTE — PROGRESS NOTES
Daily Note     Today's date: 2020  Patient name: Juarez Downing  : 1970  MRN: [id number]  Referring provider: Erma Walls, PT  Dx:   Encounter Diagnosis     ICD-10-CM    1  Left shoulder pain, unspecified chronicity M25 512                   Subjective:Reports compliance with HEP      Objective: See treatment diary below      Assessment: Tolerated treatment well   Patient would benefit from continued PT      Plan:Continue with POC     Precautions: none      Manual  1/2                         STM to axilla, GH inferior mobs, intrinsics ND ND                                                      Exercise Diary                           Upper thoracic extension over 1/2 foam, f/b supine wand flexion 10 5           Foam roller series  5           Table slides ER  20x10"                                                                                                                                                                                                                               Modalities

## 2020-01-08 DIAGNOSIS — I10 ESSENTIAL HYPERTENSION: ICD-10-CM

## 2020-01-08 DIAGNOSIS — F41.1 GENERALIZED ANXIETY DISORDER: ICD-10-CM

## 2020-01-08 DIAGNOSIS — E03.9 HYPOTHYROIDISM, UNSPECIFIED TYPE: ICD-10-CM

## 2020-01-08 RX ORDER — LEVOTHYROXINE SODIUM 0.05 MG/1
50 TABLET ORAL DAILY
Qty: 90 TABLET | Refills: 3 | Status: SHIPPED | OUTPATIENT
Start: 2020-01-08 | End: 2021-02-22 | Stop reason: SDUPTHER

## 2020-01-08 RX ORDER — FLUOXETINE HYDROCHLORIDE 20 MG/1
20 CAPSULE ORAL DAILY
Qty: 90 CAPSULE | Refills: 3 | Status: SHIPPED | OUTPATIENT
Start: 2020-01-08 | End: 2021-02-22 | Stop reason: SDUPTHER

## 2020-01-08 RX ORDER — HYDROCHLOROTHIAZIDE 50 MG/1
50 TABLET ORAL DAILY
Qty: 90 TABLET | Refills: 3 | Status: SHIPPED | OUTPATIENT
Start: 2020-01-08 | End: 2021-02-22 | Stop reason: SDUPTHER

## 2020-01-09 ENCOUNTER — OFFICE VISIT (OUTPATIENT)
Dept: PHYSICAL THERAPY | Facility: CLINIC | Age: 50
End: 2020-01-09
Payer: COMMERCIAL

## 2020-01-09 DIAGNOSIS — M25.512 LEFT SHOULDER PAIN, UNSPECIFIED CHRONICITY: Primary | ICD-10-CM

## 2020-01-09 PROCEDURE — 97140 MANUAL THERAPY 1/> REGIONS: CPT | Performed by: PHYSICAL THERAPIST

## 2020-01-09 PROCEDURE — 97112 NEUROMUSCULAR REEDUCATION: CPT | Performed by: PHYSICAL THERAPIST

## 2020-01-09 PROCEDURE — 97110 THERAPEUTIC EXERCISES: CPT | Performed by: PHYSICAL THERAPIST

## 2020-01-10 NOTE — PROGRESS NOTES
Daily Note     Today's date: 2020  Patient name: Geovani Galvan  : 1970  MRN: [id number]  Referring provider: Ann Marie Alves, PT  Dx:   Encounter Diagnosis     ICD-10-CM    1  Left shoulder pain, unspecified chronicity M25 512                   Subjective:Reports improved mobility with less pain since last session      Objective: See treatment diary below      Assessment: Tolerated treatment well   Patient would benefit from continued PT      Plan:Continue with POC     Precautions: none      Manual  1/2                        STM to axilla, GH inferior mobs, intrinsics ND ND ND                                                     Exercise Diary                          Upper thoracic extension over 1/2 foam, f/b supine wand flexion 10 5 ND          Foam roller series  5 ND          Table slides ER  20x10" ND          Prone ER slides   20x                                                                                                                                                                                                                 Modalities

## 2020-01-13 ENCOUNTER — OFFICE VISIT (OUTPATIENT)
Dept: PHYSICAL THERAPY | Facility: CLINIC | Age: 50
End: 2020-01-13
Payer: COMMERCIAL

## 2020-01-13 DIAGNOSIS — M25.512 LEFT SHOULDER PAIN, UNSPECIFIED CHRONICITY: Primary | ICD-10-CM

## 2020-01-13 PROCEDURE — 97140 MANUAL THERAPY 1/> REGIONS: CPT | Performed by: PHYSICAL THERAPIST

## 2020-01-13 PROCEDURE — 97110 THERAPEUTIC EXERCISES: CPT | Performed by: PHYSICAL THERAPIST

## 2020-01-13 PROCEDURE — 97112 NEUROMUSCULAR REEDUCATION: CPT | Performed by: PHYSICAL THERAPIST

## 2020-01-16 ENCOUNTER — OFFICE VISIT (OUTPATIENT)
Dept: PHYSICAL THERAPY | Facility: CLINIC | Age: 50
End: 2020-01-16
Payer: COMMERCIAL

## 2020-01-16 DIAGNOSIS — M25.512 LEFT SHOULDER PAIN, UNSPECIFIED CHRONICITY: Primary | ICD-10-CM

## 2020-01-16 PROCEDURE — 97112 NEUROMUSCULAR REEDUCATION: CPT | Performed by: PHYSICAL THERAPIST

## 2020-01-16 PROCEDURE — 97140 MANUAL THERAPY 1/> REGIONS: CPT | Performed by: PHYSICAL THERAPIST

## 2020-01-16 PROCEDURE — 97110 THERAPEUTIC EXERCISES: CPT | Performed by: PHYSICAL THERAPIST

## 2020-01-16 NOTE — PROGRESS NOTES
Daily Note     Today's date: 2020  Patient name: Aaron Zhong  : 1970  MRN: [id number]  Referring provider: Miri Damian, PT  Dx:   Encounter Diagnosis     ICD-10-CM    1  Left shoulder pain, unspecified chronicity M25 512                   Subjective:Reports improved mobility with less pain since last session      Objective: See treatment diary below      Assessment: Tolerated treatment well   Patient would benefit from continued PT      Plan:Continue with POC     Precautions: none      Manual                       STM to axilla, GH inferior mobs, intrinsics ND ND ND Nd ND                                                   Exercise Diary                        Upper thoracic extension over 1/2 foam, f/b supine wand flexion 10 5 ND Nd Nd        Foam roller series  5 ND Nd ND        Table slides ER  20x10" ND ND Nd        Prone ER slides   20x ND nd                     Pivot prone    10x10" Nd        1st rib towel stretch    20x ND                                                                                                                                                                        Modalities

## 2020-01-21 ENCOUNTER — OFFICE VISIT (OUTPATIENT)
Dept: PHYSICAL THERAPY | Facility: CLINIC | Age: 50
End: 2020-01-21
Payer: COMMERCIAL

## 2020-01-21 DIAGNOSIS — M25.512 LEFT SHOULDER PAIN, UNSPECIFIED CHRONICITY: Primary | ICD-10-CM

## 2020-01-21 PROCEDURE — 97140 MANUAL THERAPY 1/> REGIONS: CPT | Performed by: PHYSICAL THERAPIST

## 2020-01-21 PROCEDURE — 97110 THERAPEUTIC EXERCISES: CPT | Performed by: PHYSICAL THERAPIST

## 2020-01-21 PROCEDURE — 97112 NEUROMUSCULAR REEDUCATION: CPT | Performed by: PHYSICAL THERAPIST

## 2020-01-21 NOTE — PROGRESS NOTES
Daily Note     Today's date: 2020  Patient name: Julian Sandhoff  : 1970  MRN: [id number]  Referring provider: Mita Herron, PT  Dx:   Encounter Diagnosis     ICD-10-CM    1  Left shoulder pain, unspecified chronicity M25 512                   Subjective:Reports improved mobility with less pain since last session      Objective: See treatment diary below      Assessment: Tolerated treatment well   Patient would benefit from continued PT      Plan:Continue with POC     Precautions: none      Manual                      STM to axilla, GH inferior mobs, intrinsics ND ND ND Nd ND ND                                                  Exercise Diary                       Upper thoracic extension over 1/2 foam, f/b supine wand flexion 10 5 ND Nd Nd ND       Foam roller series  5 ND Nd ND ND       Table slides ER  20x10" ND ND Nd ND       Prone ER slides   20x ND nd ND                    Pivot prone    10x10" Nd Nd       1st rib towel stretch    20x ND Nd                                                                                                                                                                       Modalities

## 2020-01-23 ENCOUNTER — APPOINTMENT (OUTPATIENT)
Dept: PHYSICAL THERAPY | Facility: CLINIC | Age: 50
End: 2020-01-23
Payer: COMMERCIAL

## 2020-01-28 ENCOUNTER — OFFICE VISIT (OUTPATIENT)
Dept: PHYSICAL THERAPY | Facility: CLINIC | Age: 50
End: 2020-01-28
Payer: COMMERCIAL

## 2020-01-28 DIAGNOSIS — M25.512 LEFT SHOULDER PAIN, UNSPECIFIED CHRONICITY: Primary | ICD-10-CM

## 2020-01-28 PROCEDURE — 97110 THERAPEUTIC EXERCISES: CPT | Performed by: PHYSICAL THERAPIST

## 2020-01-28 PROCEDURE — 97112 NEUROMUSCULAR REEDUCATION: CPT | Performed by: PHYSICAL THERAPIST

## 2020-01-28 PROCEDURE — 97140 MANUAL THERAPY 1/> REGIONS: CPT | Performed by: PHYSICAL THERAPIST

## 2020-01-28 NOTE — PROGRESS NOTES
Daily Note     Today's date: 2020  Patient name: Anand Plaza  : 1970  MRN: [id number]  Referring provider: Jaskaran Jose, PT  Dx:   Encounter Diagnosis     ICD-10-CM    1  Left shoulder pain, unspecified chronicity M25 512                   Subjective:Reports improved mobility with less pain since last session      Objective: See treatment diary below      Assessment: Tolerated treatment well   Patient would benefit from continued PT      Plan:Continue with POC     Precautions: none      Manual                     STM to axilla, GH inferior mobs, intrinsics ND ND ND Nd ND ND ND                                                 Exercise Diary                      Upper thoracic extension over 1/2 foam, f/b supine wand flexion 10 5 ND Nd Nd ND ND      Foam roller series  5 ND Nd ND ND ND      Table slides ER  20x10" ND ND Nd ND ND      Prone ER slides   20x ND nd ND                    Pivot prone    10x10" Nd Nd ND      1st rib towel stretch    20x ND Nd ND                                                                                                                                                                      Modalities

## 2020-01-30 ENCOUNTER — OFFICE VISIT (OUTPATIENT)
Dept: PHYSICAL THERAPY | Facility: CLINIC | Age: 50
End: 2020-01-30
Payer: COMMERCIAL

## 2020-01-30 DIAGNOSIS — M25.512 LEFT SHOULDER PAIN, UNSPECIFIED CHRONICITY: Primary | ICD-10-CM

## 2020-01-30 PROCEDURE — 97112 NEUROMUSCULAR REEDUCATION: CPT | Performed by: PHYSICAL THERAPIST

## 2020-01-30 PROCEDURE — 97140 MANUAL THERAPY 1/> REGIONS: CPT | Performed by: PHYSICAL THERAPIST

## 2020-01-30 PROCEDURE — 97110 THERAPEUTIC EXERCISES: CPT | Performed by: PHYSICAL THERAPIST

## 2020-01-30 NOTE — PROGRESS NOTES
Daily Note     Today's date: 2020  Patient name: Casey Smith  : 1970  MRN: [id number]  Referring provider: Nisha Cueva, PT  Dx:   Encounter Diagnosis     ICD-10-CM    1  Left shoulder pain, unspecified chronicity M25 512                   Subjective:Reports improved mobility with less pain since last session      Objective: See treatment diary below      Assessment: Tolerated treatment well   Patient would benefit from continued PT      Plan:Hold PT at this time     Precautions: none      Manual  1/2                   STM to axilla, GH inferior mobs, intrinsics ND ND ND Nd ND ND ND ND                                                Exercise Diary                     Upper thoracic extension over 1/2 foam, f/b supine wand flexion 10 5 ND Nd Nd ND ND ND     Foam roller series  5 ND Nd ND ND ND ND     Table slides ER  20x10" ND ND Nd ND ND ND     Prone ER slides   20x ND nd ND  ND                  Pivot prone    10x10" Nd Nd ND ND     1st rib towel stretch    20x ND Nd ND ND                                                                                                                                                                     Modalities

## 2020-03-15 ENCOUNTER — AMB VIDEO VISIT (OUTPATIENT)
Dept: OTHER | Facility: HOSPITAL | Age: 50
End: 2020-03-15
Payer: COMMERCIAL

## 2020-03-15 DIAGNOSIS — B34.9 VIRAL SYNDROME: Primary | ICD-10-CM

## 2020-03-15 PROCEDURE — 99201 PR OFFICE OUTPATIENT NEW 10 MINUTES: CPT | Performed by: PHYSICIAN ASSISTANT

## 2020-03-15 RX ORDER — LEVOTHYROXINE SODIUM 0.07 MG/1
75 TABLET ORAL DAILY
COMMUNITY
End: 2020-03-18

## 2020-03-15 RX ORDER — HYDROCHLOROTHIAZIDE 25 MG/1
25 TABLET ORAL DAILY
COMMUNITY
End: 2020-03-18

## 2020-03-15 NOTE — PATIENT INSTRUCTIONS
Patient's symptoms are consistent with being viral in origin  I explained to the patient that she is very low risk for COVID-19 and that she does not meet any criteria to have a swab performed at this time  I instructed the patient on doing supportive measures at home  I also instructed her that I was unable to visualize her throat and without seeing her in person it is a very limited exam and I am unable to do any further testing  I explained to the patient that if her symptoms get worse in any way then she would need to be evaluated in person by a provider  Signs and symptoms to go to the ER were discussed  Patient expressed understanding and is in agreement

## 2020-03-15 NOTE — CARE ANYWHERE EVISITS
Visit Summary for [facility]   MATHEW - Gender: Female - Date of Birth: [id number]  Date: [id number] - Duration: 5 minutes  Patient: [facility]   MATHEW  Provider: Francisco Javier Kurtz PA-C    Patient Contact Information  Address  [address] [place] [address]; [facility]  [phone]    Visit Topics  sore throat, cough,  hoarse voice [Added By: Self - 2020-03-15]    Triage Questions   Have you had any international travel in the last 14 days? Answer [no]  Have you had any exposure to a known or expected Covid-19 patient in the last 14 days? Answer [no]  Do you have any immune system compromise or chronic lung disease? Answer [no]  Do you have any vulnerable family members in the home (infant, pregnant, cancer, elderly)? Answer [no]     Conversation Transcripts  [0A][0A] [Notification] Eli Pérez,  Practice Adm, will help you prepare for your visit  She is assisting Robi Bergman, Urgent Care Specialist [0A][Eli Pérez] Hi thank you for choosing [facility]  A provider will be with you   shortly  [0A][COOPER CARMONA] thanks[0A][Notification] Eli Pérez has left the room  [0A][Notification] You are connected with Francisco Javier Kurtz PA-C, Urgent Care Specialist [0A][Notification] [facility] MATHEW is located in   [place]  [0A][Notification] [facility] MATHEW has shared health history  Edmundo Britt  [0A]    Diagnosis    Procedures  Value: [id number] Code: CPT-4 UNLISTED E&M SERVICE    Medications Prescribed    No prescriptions ordered    Electronically signed by: Pamela Nicholson(NPI [id number])

## 2020-03-15 NOTE — PROGRESS NOTES
Video Visit - Jessica Moore 52 y o  female MRN: [id number]    REQUIRED DOCUMENTATION:       There were no vitals filed for this visit  1  This service was provided via [facility]  2  Provider located at [place] [address]  [address] [place] [address] [address]  [phone]  3  [facility] provider: Cierra Del Rio PA-C   4  Identify all parties in room with patient during [facility] visit:    5  After connecting through [employer], patient was identified by name and date of birth  Patient was then informed that this was a Telemedicine visit and that the exam was being conducted confidentially over secure lines  My office door was closed  No one else was in the room  Patient acknowledged consent and understanding of privacy and security of the Telemedicine visit  I informed the patient that I have reviewed their record in Epic and presented the opportunity for them to ask any questions regarding the visit today  The patient agreed to participate  Patient is a 49-year-old female with a medical history of hypertension and hypothyroidism who was evaluated via video visit  Patient states that for the past couple of days she has had sore throat, cough, hoarse voice and fatigue  Patient denies any fevers or chills  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  She also states that she worked her per sheila job 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 at [employer] [facility] [employer] [facility]   Patient denies any recent travel to endemic areas within the past 14 days  She denies coming into contact with anyone who was positive for COVID-19  Physical Exam   Constitutional: She is oriented to person, place, and time  She appears well-developed and well-nourished  Patient is speaking in full sentences  Her voice does not sound hoarse  She is tolerating her secretions     Neck:   Patient admits to feeling some slight anterior cervical lymphadenopathy Pulmonary/Chest: Effort normal    Neurological: She is alert and oriented to person, place, and time  Psychiatric: She has a normal mood and affect  Diagnoses and all orders for this visit:    Viral syndrome      Patient Instructions   Patient's symptoms are consistent with being viral in origin  I explained to the patient that she is very low risk for COVID-19 and that she does not meet any criteria to have a swab performed at this time  I instructed the patient on doing supportive measures at home  I also instructed her that I was unable to visualize her throat and without seeing her in person it is a very limited exam and I am unable to do any further testing  I explained to the patient that if her symptoms get worse in any way then she would need to be evaluated in person by a provider  Signs and symptoms to go to the ER were discussed  Patient expressed understanding and is in agreement  Follow up with PCP if not improved, if symptoms are worse, go to the ER

## 2020-03-18 ENCOUNTER — OFFICE VISIT (OUTPATIENT)
Dept: FAMILY MEDICINE CLINIC | Facility: CLINIC | Age: 50
End: 2020-03-18
Payer: COMMERCIAL

## 2020-03-18 VITALS
DIASTOLIC BLOOD PRESSURE: 70 MMHG | OXYGEN SATURATION: 98 % | HEIGHT: 66 IN | RESPIRATION RATE: 16 BRPM | TEMPERATURE: 98 F | HEART RATE: 83 BPM | BODY MASS INDEX: 31.82 KG/M2 | WEIGHT: 198 LBS | SYSTOLIC BLOOD PRESSURE: 122 MMHG

## 2020-03-18 DIAGNOSIS — J01.10 ACUTE FRONTAL SINUSITIS, RECURRENCE NOT SPECIFIED: Primary | ICD-10-CM

## 2020-03-18 PROCEDURE — 3074F SYST BP LT 130 MM HG: CPT | Performed by: FAMILY MEDICINE

## 2020-03-18 PROCEDURE — 1036F TOBACCO NON-USER: CPT | Performed by: FAMILY MEDICINE

## 2020-03-18 PROCEDURE — 3078F DIAST BP <80 MM HG: CPT | Performed by: FAMILY MEDICINE

## 2020-03-18 PROCEDURE — 3008F BODY MASS INDEX DOCD: CPT | Performed by: FAMILY MEDICINE

## 2020-03-18 PROCEDURE — 99213 OFFICE O/P EST LOW 20 MIN: CPT | Performed by: FAMILY MEDICINE

## 2020-03-18 RX ORDER — AZITHROMYCIN 250 MG/1
TABLET, FILM COATED ORAL
Qty: 6 TABLET | Refills: 0 | Status: SHIPPED | OUTPATIENT
Start: 2020-03-18 | End: 2020-03-22

## 2020-03-18 NOTE — PROGRESS NOTES
Assessment/Plan:  Patient was started on a Z-Rickey and may continue Robitussin DM p r n  Edmundo Britt Patient may take Tylenol or ibuprofen p r erich Britt She is encouraged to drink plenty of fluids and rest   Recommend patient remain out of work this week  Return to the office next week or call sooner juana Britt Diagnoses and all orders for this visit:    Acute frontal sinusitis, recurrence not specified  -     azithromycin (ZITHROMAX) 250 mg tablet; Take 2 tablets today then 1 tablet daily x 4 days          Subjective:      Patient ID: Mallika Gandhi is a 52 y o  female  Patient started 1 week ago with cold symptoms  She complains of nasal congestion productive of thick green mucus, sore throat and cough  She admits to sinus pressure headache but denies fever  She denies shortness of breath  She has treated this with OTC cold and sinus medications with some relief  Patient did receive yearly flu vaccine this season  URI    This is a new problem  The current episode started in the past 7 days  There has been no fever  Associated symptoms include congestion, coughing, headaches, a plugged ear sensation, rhinorrhea, sinus pain, sneezing and a sore throat  Pertinent negatives include no diarrhea, ear pain, nausea, vomiting or wheezing  She has tried decongestant, increased fluids and NSAIDs (tylenol flu,robitussin) for the symptoms  The treatment provided mild relief  The following portions of the patient's history were reviewed and updated as appropriate: allergies, current medications, past family history, past medical history, past social history, past surgical history and problem list     Review of Systems   HENT: Positive for congestion, rhinorrhea, sinus pain, sneezing and sore throat  Negative for ear pain  Respiratory: Positive for cough  Negative for wheezing  Gastrointestinal: Negative for diarrhea, nausea and vomiting  Neurological: Positive for headaches           Objective:      /70 (BP Location: Left arm, Patient Position: Sitting, Cuff Size: Standard)   Pulse 83   Temp 98 °F (36 7 °C) (Tympanic)   Resp 16   Ht 5' 6" (1 676 m)   Wt 89 8 kg (198 lb)   LMP  (LMP Unknown)   SpO2 98%   BMI 31 96 kg/m²          Physical Exam   Constitutional: She is oriented to person, place, and time  She appears well-developed and well-nourished  No distress  HENT:   Head: Normocephalic  Right Ear: External ear normal    Left Ear: External ear normal    Turbinates swelling with purulent drainage  Throat postnasal drainage and erythema  Mucous membranes moist    Eyes: Conjunctivae are normal  No scleral icterus  Neck: Neck supple  Cardiovascular: Normal rate and regular rhythm  Pulmonary/Chest: Effort normal and breath sounds normal  No respiratory distress  She has no wheezes  Abdominal: Soft  There is no tenderness  Musculoskeletal: She exhibits no edema  Lymphadenopathy:     She has no cervical adenopathy  Neurological: She is alert and oriented to person, place, and time  Skin: Skin is warm and dry  Psychiatric: She has a normal mood and affect  BMI Counseling: Body mass index is 31 96 kg/m²  The BMI is above normal  Nutrition recommendations include 3-5 servings of fruits/vegetables daily and consuming healthier snacks  Exercise recommendations include moderate aerobic physical activity for 150 minutes/week

## 2020-04-10 ENCOUNTER — TELEMEDICINE (OUTPATIENT)
Dept: SLEEP CENTER | Facility: CLINIC | Age: 50
End: 2020-04-10
Payer: COMMERCIAL

## 2020-04-10 ENCOUNTER — TELEPHONE (OUTPATIENT)
Dept: SLEEP CENTER | Facility: CLINIC | Age: 50
End: 2020-04-10

## 2020-04-10 DIAGNOSIS — G25.81 RESTLESS LEG SYNDROME: ICD-10-CM

## 2020-04-10 DIAGNOSIS — G47.33 OBSTRUCTIVE SLEEP APNEA: Primary | ICD-10-CM

## 2020-04-10 DIAGNOSIS — E66.9 OBESITY (BMI 30-39.9): ICD-10-CM

## 2020-04-10 DIAGNOSIS — F41.9 ANXIETY: ICD-10-CM

## 2020-04-10 PROCEDURE — 99213 OFFICE O/P EST LOW 20 MIN: CPT | Performed by: INTERNAL MEDICINE

## 2020-04-13 ENCOUNTER — TELEPHONE (OUTPATIENT)
Dept: SLEEP CENTER | Facility: CLINIC | Age: 50
End: 2020-04-13

## 2020-05-18 DIAGNOSIS — E78.5 DYSLIPIDEMIA: ICD-10-CM

## 2020-05-18 RX ORDER — ATORVASTATIN CALCIUM 10 MG/1
10 TABLET, FILM COATED ORAL DAILY
Qty: 90 TABLET | Refills: 3 | Status: SHIPPED | OUTPATIENT
Start: 2020-05-18 | End: 2021-09-01 | Stop reason: SDUPTHER

## 2020-07-01 ENCOUNTER — TELEPHONE (OUTPATIENT)
Dept: FAMILY MEDICINE CLINIC | Facility: CLINIC | Age: 50
End: 2020-07-01

## 2020-07-07 ENCOUNTER — OFFICE VISIT (OUTPATIENT)
Dept: FAMILY MEDICINE CLINIC | Facility: CLINIC | Age: 50
End: 2020-07-07
Payer: COMMERCIAL

## 2020-07-07 VITALS
DIASTOLIC BLOOD PRESSURE: 70 MMHG | BODY MASS INDEX: 31.34 KG/M2 | WEIGHT: 195 LBS | HEIGHT: 66 IN | TEMPERATURE: 98.3 F | SYSTOLIC BLOOD PRESSURE: 122 MMHG

## 2020-07-07 DIAGNOSIS — I10 ESSENTIAL HYPERTENSION: ICD-10-CM

## 2020-07-07 DIAGNOSIS — Z82.49 FAMILY HISTORY OF CORONARY ARTERY DISEASE: ICD-10-CM

## 2020-07-07 DIAGNOSIS — Z00.00 WELL ADULT EXAM: Primary | ICD-10-CM

## 2020-07-07 DIAGNOSIS — E03.9 HYPOTHYROIDISM, UNSPECIFIED TYPE: ICD-10-CM

## 2020-07-07 DIAGNOSIS — Z12.31 SCREENING MAMMOGRAM, ENCOUNTER FOR: ICD-10-CM

## 2020-07-07 DIAGNOSIS — Z12.11 COLON CANCER SCREENING: ICD-10-CM

## 2020-07-07 PROCEDURE — 99396 PREV VISIT EST AGE 40-64: CPT | Performed by: FAMILY MEDICINE

## 2020-07-07 NOTE — PROGRESS NOTES
Assessment/Plan:  Recommend screening colonoscopy  Recommend annual mammography and follow-up with gynecologist   Return to office for recheck again in 1 year  Sooner if needed  Recommend lab testing  Continue to follow blood pressure  She does have family history of cardiovascular disease in early age with her father  Recommended stress testing  No problem-specific Assessment & Plan notes found for this encounter  Diagnoses and all orders for this visit:    Well adult exam    Screening mammogram, encounter for  -     Mammo screening bilateral w 3d & cad; Future    Colon cancer screening  -     Ambulatory referral to Gastroenterology; Future    Essential hypertension  -     CBC and differential  -     Comprehensive metabolic panel  -     Lipid Panel with Direct LDL reflex  -     Hemoglobin A1C  -     TSH, 3rd generation with Free T4 reflex; Future  -     Stress test only, exercise; Future    Hypothyroidism, unspecified type  -     CBC and differential  -     Comprehensive metabolic panel  -     Lipid Panel with Direct LDL reflex  -     Hemoglobin A1C  -     TSH, 3rd generation with Free T4 reflex; Future  -     Stress test only, exercise; Future    Family history of coronary artery disease  -     Stress test only, exercise; Future    Other orders  -     Discontinue: ALPRAZolam (XANAX PO); Xanax          Subjective:      Patient ID: Derik Crawford is a 48 y o  female  Patient here for annual physical   She is due for colonoscopy for colon cancer screening  She is generally feeling well  She does continue to follow with ophthalmologist at the [facility]  The following portions of the patient's history were reviewed and updated as appropriate: allergies, current medications, past family history, past medical history, past social history, past surgical history and problem list     Review of Systems   Constitutional: Negative  HENT: Negative  Eyes: Negative  Respiratory: Negative  Cardiovascular: Negative  Gastrointestinal: Negative  Endocrine: Negative  Genitourinary: Negative  Musculoskeletal: Negative  Skin: Negative  Allergic/Immunologic: Negative  Neurological: Negative  Hematological: Negative  Psychiatric/Behavioral: Negative  Objective:      /70 (BP Location: Left arm, Patient Position: Sitting, Cuff Size: Adult)   Temp 98 3 °F (36 8 °C)   Ht 5' 5 95" (1 675 m)   Wt 88 5 kg (195 lb)   LMP  (LMP Unknown)   BMI 31 53 kg/m²          Physical Exam   Constitutional: She is oriented to person, place, and time  She appears well-developed and well-nourished  HENT:   Head: Normocephalic and atraumatic  Right Ear: External ear normal  Tympanic membrane is not erythematous and not bulging  Left Ear: External ear normal  Tympanic membrane is not erythematous and not bulging  Nose: Nose normal    Mouth/Throat: Oropharynx is clear and moist and mucous membranes are normal  No oral lesions  No oropharyngeal exudate  Eyes: Conjunctivae and EOM are normal  Right eye exhibits no discharge  Left eye exhibits no discharge  No scleral icterus  Neck: Normal range of motion  Neck supple  No thyromegaly present  Cardiovascular: Normal rate, regular rhythm and normal heart sounds  Exam reveals no gallop and no friction rub  No murmur heard  Pulmonary/Chest: Effort normal  No respiratory distress  She has no wheezes  She has no rales  She exhibits no tenderness  Abdominal: Soft  Bowel sounds are normal  She exhibits no distension and no mass  There is no tenderness  There is no rebound and no guarding  Musculoskeletal: Normal range of motion  She exhibits no edema, tenderness or deformity  Lymphadenopathy:     She has no cervical adenopathy  Neurological: She is alert and oriented to person, place, and time  She has normal reflexes  No cranial nerve deficit  She exhibits normal muscle tone   Coordination normal  Skin: Skin is warm and dry  No rash noted  No erythema  No pallor  Psychiatric: She has a normal mood and affect  Her behavior is normal    Vitals reviewed

## 2020-07-12 ENCOUNTER — OFFICE VISIT (OUTPATIENT)
Dept: URGENT CARE | Age: 50
End: 2020-07-12
Payer: COMMERCIAL

## 2020-07-12 VITALS
TEMPERATURE: 98.7 F | DIASTOLIC BLOOD PRESSURE: 70 MMHG | RESPIRATION RATE: 16 BRPM | HEIGHT: 66 IN | SYSTOLIC BLOOD PRESSURE: 125 MMHG | OXYGEN SATURATION: 98 % | WEIGHT: 195 LBS | HEART RATE: 75 BPM | BODY MASS INDEX: 31.34 KG/M2

## 2020-07-12 DIAGNOSIS — L03.90 CELLULITIS, UNSPECIFIED CELLULITIS SITE: Primary | ICD-10-CM

## 2020-07-12 PROCEDURE — G0382 LEV 3 HOSP TYPE B ED VISIT: HCPCS | Performed by: PHYSICIAN ASSISTANT

## 2020-07-12 RX ORDER — CEPHALEXIN 500 MG/1
500 CAPSULE ORAL EVERY 6 HOURS SCHEDULED
Qty: 40 CAPSULE | Refills: 0 | Status: SHIPPED | OUTPATIENT
Start: 2020-07-12 | End: 2020-07-22

## 2020-07-12 NOTE — PATIENT INSTRUCTIONS
Cellulitis   WHAT YOU NEED TO KNOW:   Cellulitis is a skin infection caused by bacteria  Cellulitis may go away on its own or you may need treatment  Your healthcare provider may draw a [place] around the outside edges of your cellulitis  If your cellulitis spreads, your healthcare provider will see it outside of the [place]  DISCHARGE INSTRUCTIONS:   Call 911 if:   · You have sudden trouble breathing or chest pain  Return to the emergency department if:   · Your wound gets larger and more painful  · You feel a crackling under your skin when you touch it  · You have purple dots or bumps on your skin, or you see bleeding under your skin  · You have new swelling and pain in your legs  · The red, warm, swollen area gets larger  · You see red streaks coming from the infected area  Contact your healthcare provider if:   · You have a fever  · Your fever or pain does not go away or gets worse  · The area does not get smaller after 2 days of antibiotics  · Your skin is flaking or peeling off  · You have questions or concerns about your condition or care  Medicines:   · Antibiotics  help treat the bacterial infection  · NSAIDs , such as ibuprofen, help decrease swelling, pain, and fever  NSAIDs can cause stomach bleeding or kidney problems in certain people  If you take blood thinner medicine, always ask if NSAIDs are safe for you  Always read the medicine label and follow directions  Do not give these medicines to children under 10months of age without direction from your child's healthcare provider  · Acetaminophen  decreases pain and fever  It is available without a doctor's order  Ask how much to take and how often to take it  Follow directions  Read the labels of all other medicines you are using to see if they also contain acetaminophen, or ask your doctor or pharmacist  Acetaminophen can cause liver damage if not taken correctly   Do not use more than 4 grams (4,000 milligrams) total of acetaminophen in one day  · Take your medicine as directed  Contact your healthcare provider if you think your medicine is not helping or if you have side effects  Tell him or her if you are allergic to any medicine  Keep a list of the medicines, vitamins, and herbs you take  Include the amounts, and when and why you take them  Bring the list or the pill bottles to follow-up visits  Carry your medicine list with you in case of an emergency  Self-care:   · Elevate the area above the level of your heart  as often as you can  This will help decrease swelling and pain  Prop the area on pillows or blankets to keep it elevated comfortably  · Clean the area daily until the wound scabs over  Gently wash the area with soap and water  Pat dry  Use dressings as directed  · Place cool or warm, wet cloths on the area as directed  Use clean cloths and clean water  Leave it on the area until the cloth is room temperature  Pat the area dry with a clean, dry cloth  The cloths may help decrease pain  Prevent cellulitis:   · Do not scratch bug bites or areas of injury  You increase your risk for cellulitis by scratching these areas  · Do not share personal items, such as towels, clothing, and razors  · Clean exercise equipment  with germ-killing  before and after you use it  · Wash your hands often  Use soap and water  Wash your hands after you use the bathroom, change a child's diapers, or sneeze  Wash your hands before you prepare or eat food  Use lotion to prevent dry, cracked skin  · Wear pressure stockings as directed  You may be told to wear the stockings if you have peripheral edema  The stockings improve blood flow and decrease swelling  · Treat athlete's foot  This can help prevent the spread of a bacterial skin infection  Follow up with your healthcare provider within 3 days, or as directed:   Your healthcare provider will check if your cellulitis is getting better  You may need different medicine  Write down your questions so you remember to ask them during your visits  © 2017 [employer] Information is for End User's use only and may not be sold, redistributed or otherwise used for commercial purposes  All illustrations and images included in CareNotes® are the copyrighted property of [employer]  or [employer]  The above information is an  only  It is not intended as medical advice for individual conditions or treatments  Talk to your doctor, nurse or pharmacist before following any medical regimen to see if it is safe and effective for you

## 2020-07-12 NOTE — PROGRESS NOTES
[facility]        NAME: Cheryl Reynolds is a 48 y o  female  : 1970    MRN: [id number]  DATE: 2020  TIME: 11:20 AM    /70   Pulse 75   Temp 98 7 °F (37 1 °C) (Tympanic)   Resp 16   Ht 5' 6" (1 676 m)   Wt 88 5 kg (195 lb)   LMP  (LMP Unknown)   SpO2 98%   BMI 31 47 kg/m²     Assessment and Plan   Cellulitis, unspecified cellulitis site [L03 90]  1  Cellulitis, unspecified cellulitis site  cephalexin (KEFLEX) 500 mg capsule         Patient Instructions       Follow up with PCP in 3-5 days  Proceed to  ER if symptoms worsen  Chief Complaint     Chief Complaint   Patient presents with    Insect Bite     Patient stung by a bee on Friday AM, over the course of the day erythema and edema was observed  Patient had a similar instance last year that required keflex  History of Present Illness       Pt with right leg bee sting x 2 days  Pt with erythema and some swelling     Insect Bite   This is a new problem  The current episode started yesterday  The problem occurs constantly  The problem has been unchanged  Associated symptoms include a rash  Nothing aggravates the symptoms  She has tried nothing for the symptoms  The treatment provided no relief  Review of Systems   Review of Systems   Constitutional: Negative  HENT: Negative  Eyes: Negative  Respiratory: Negative  Cardiovascular: Negative  Gastrointestinal: Negative  Endocrine: Negative  Genitourinary: Negative  Musculoskeletal: Negative  Skin: Positive for rash  Allergic/Immunologic: Negative  Neurological: Negative  Hematological: Negative  Psychiatric/Behavioral: Negative  All other systems reviewed and are negative          Current Medications       Current Outpatient Medications:     acetaminophen (TYLENOL) 325 mg tablet, Take by mouth as needed , Disp: , Rfl:     atorvastatin (LIPITOR) 10 mg tablet, Take 1 tablet (10 mg total) by mouth daily, Disp: 90 tablet, Rfl: 3    carboxymethylcellulose (REFRESH PLUS) 0 5 % SOLN, Apply to eye, Disp: , Rfl:     Elastic Bandages & Supports (MEDICAL COMPRESSION STOCKINGS) MISC, Use daily as directed  20-30 compression  , Disp: 6 each, Rfl: 0    FLUoxetine (PROzac) 20 mg capsule, Take 1 capsule (20 mg total) by mouth daily, Disp: 90 capsule, Rfl: 3    hydrochlorothiazide (HYDRODIURIL) 50 mg tablet, Take 1 tablet (50 mg total) by mouth daily, Disp: 90 tablet, Rfl: 3    levothyroxine 50 mcg tablet, Take 1 tablet (50 mcg total) by mouth daily, Disp: 90 tablet, Rfl: 3    loteprednol etabonate (LOTEMAX) 0 5 % ophth gel, Administer 1 drop to the right eye Every other day, Disp: , Rfl:     pseudoephedrine (SUDAFED) 30 mg tablet, Take 60 mg by mouth every 4 (four) hours as needed for congestion, Disp: , Rfl:     timolol (TIMOPTIC) 0 5 % ophthalmic solution, 1 drop In rt eye nightly, Disp: , Rfl:     cephalexin (KEFLEX) 500 mg capsule, Take 1 capsule (500 mg total) by mouth every 6 (six) hours for 10 days, Disp: 40 capsule, Rfl: 0    Current Allergies     Allergies as of 07/12/2020 - Reviewed 07/12/2020   Allergen Reaction Noted    Sulfa antibiotics  03/15/2020    Sulfa antibiotics Rash             The following portions of the patient's history were reviewed and updated as appropriate: allergies, current medications, past family history, past medical history, past social history, past surgical history and problem list      Past Medical History:   Diagnosis Date    Axenfeld-Dora syndrome     Disease of thyroid gland     Herniated nucleus pulposus, L5-S1     last assesed [id number]    Hypertension     Sleep apnea        Past Surgical History:   Procedure Laterality Date    CATARACT EXTRACTION, BILATERAL Bilateral     CLOSED REDUCTION ELBOW DISLOCATION      CORNEAL TRANSPLANT      WISDOM TOOTH EXTRACTION         Family History   Problem Relation Age of Onset    Atrial fibrillation Mother     Stroke Mother     Hypertension Mother     Supraventricular tachycardia Mother     Rheum arthritis Mother     Heart attack Father     Heart disease Father     Cancer Neg Hx     Diabetes Neg Hx     Thyroid disease Neg Hx          Medications have been verified  Objective   /70   Pulse 75   Temp 98 7 °F (37 1 °C) (Tympanic)   Resp 16   Ht 5' 6" (1 676 m)   Wt 88 5 kg (195 lb)   LMP  (LMP Unknown)   SpO2 98%   BMI 31 47 kg/m²        Physical Exam     Physical Exam   Constitutional: She is oriented to person, place, and time  She appears well-developed and well-nourished  HENT:   Head: Normocephalic  Right Ear: External ear normal    Left Ear: External ear normal    Nose: Nose normal    Mouth/Throat: Oropharynx is clear and moist    Eyes: Pupils are equal, round, and reactive to light  Conjunctivae and EOM are normal    Neck: Normal range of motion  Neck supple  Cardiovascular: Normal rate, regular rhythm and normal heart sounds  Pulmonary/Chest: Effort normal and breath sounds normal    Abdominal: Soft  Bowel sounds are normal    Musculoskeletal: Normal range of motion  Neurological: She is alert and oriented to person, place, and time  Skin: Skin is warm  Capillary refill takes less than 2 seconds  Right knee bee sting  Slight erythema   No joint pain and swelling distal neuro and vascular wnl    Psychiatric: She has a normal mood and affect  Her behavior is normal    Nursing note and vitals reviewed

## 2020-07-22 ENCOUNTER — HOSPITAL ENCOUNTER (OUTPATIENT)
Dept: NON INVASIVE DIAGNOSTICS | Facility: HOSPITAL | Age: 50
Discharge: HOME | End: 2020-07-22
Payer: COMMERCIAL

## 2020-07-22 DIAGNOSIS — E03.9 HYPOTHYROIDISM, UNSPECIFIED TYPE: ICD-10-CM

## 2020-07-22 DIAGNOSIS — Z82.49 FAMILY HISTORY OF CORONARY ARTERY DISEASE: ICD-10-CM

## 2020-07-22 DIAGNOSIS — I10 ESSENTIAL HYPERTENSION: ICD-10-CM

## 2020-07-22 LAB
CHEST PAIN STATEMENT: NORMAL
MAX DIASTOLIC BP: 78 MMHG
MAX HEART RATE: 176 BPM
MAX PREDICTED HEART RATE: 170 BPM
MAX. SYSTOLIC BP: 182 MMHG
PROTOCOL NAME: NORMAL
REASON FOR TERMINATION: NORMAL
TARGET HR FORMULA: NORMAL
TEST INDICATION: NORMAL
TIME IN EXERCISE PHASE: NORMAL

## 2020-07-22 PROCEDURE — 93018 CV STRESS TEST I&R ONLY: CPT | Performed by: INTERNAL MEDICINE

## 2020-07-22 PROCEDURE — 93016 CV STRESS TEST SUPVJ ONLY: CPT | Performed by: INTERNAL MEDICINE

## 2020-07-22 PROCEDURE — 93017 CV STRESS TEST TRACING ONLY: CPT

## 2020-09-24 DIAGNOSIS — Z12.11 SCREEN FOR COLON CANCER: Primary | ICD-10-CM

## 2020-10-09 ENCOUNTER — TRANSCRIBE ORDERS (OUTPATIENT)
Dept: ADMINISTRATIVE | Facility: HOSPITAL | Age: 50
End: 2020-10-09

## 2020-10-09 DIAGNOSIS — Z12.31 ENCOUNTER FOR SCREENING MAMMOGRAM FOR MALIGNANT NEOPLASM OF BREAST: Primary | ICD-10-CM

## 2020-10-22 ENCOUNTER — CLINICAL SUPPORT (OUTPATIENT)
Dept: FAMILY MEDICINE CLINIC | Facility: CLINIC | Age: 50
End: 2020-10-22
Payer: COMMERCIAL

## 2020-10-22 VITALS — TEMPERATURE: 97.3 F

## 2020-10-22 DIAGNOSIS — Z23 NEED FOR INFLUENZA VACCINATION: Primary | ICD-10-CM

## 2020-10-22 PROCEDURE — 90682 RIV4 VACC RECOMBINANT DNA IM: CPT

## 2020-10-22 PROCEDURE — 90471 IMMUNIZATION ADMIN: CPT

## 2020-12-23 ENCOUNTER — IMMUNIZATIONS (OUTPATIENT)
Dept: FAMILY MEDICINE CLINIC | Facility: HOSPITAL | Age: 50
End: 2020-12-23
Payer: COMMERCIAL

## 2020-12-23 DIAGNOSIS — Z23 ENCOUNTER FOR IMMUNIZATION: ICD-10-CM

## 2020-12-23 PROCEDURE — 0001A SARS-COV-2 / COVID-19 MRNA VACCINE (PFIZER-BIONTECH) 30 MCG: CPT

## 2020-12-23 PROCEDURE — 91300 SARS-COV-2 / COVID-19 MRNA VACCINE (PFIZER-BIONTECH) 30 MCG: CPT

## 2021-01-13 ENCOUNTER — IMMUNIZATIONS (OUTPATIENT)
Dept: FAMILY MEDICINE CLINIC | Facility: HOSPITAL | Age: 51
End: 2021-01-13

## 2021-01-13 DIAGNOSIS — Z23 ENCOUNTER FOR IMMUNIZATION: ICD-10-CM

## 2021-01-13 PROCEDURE — 0002A SARS-COV-2 / COVID-19 MRNA VACCINE (PFIZER-BIONTECH) 30 MCG: CPT

## 2021-01-13 PROCEDURE — 91300 SARS-COV-2 / COVID-19 MRNA VACCINE (PFIZER-BIONTECH) 30 MCG: CPT

## 2021-01-18 ENCOUNTER — HOSPITAL ENCOUNTER (OUTPATIENT)
Dept: MAMMOGRAPHY | Facility: MEDICAL CENTER | Age: 51
Discharge: HOME | End: 2021-01-18
Payer: COMMERCIAL

## 2021-01-18 VITALS — WEIGHT: 195 LBS | BODY MASS INDEX: 31.34 KG/M2 | HEIGHT: 66 IN

## 2021-01-18 DIAGNOSIS — Z12.31 SCREENING MAMMOGRAM, ENCOUNTER FOR: ICD-10-CM

## 2021-01-18 PROCEDURE — 77067 SCR MAMMO BI INCL CAD: CPT

## 2021-01-18 PROCEDURE — 77063 BREAST TOMOSYNTHESIS BI: CPT

## 2021-02-22 DIAGNOSIS — I10 ESSENTIAL HYPERTENSION: ICD-10-CM

## 2021-02-22 DIAGNOSIS — F41.1 GENERALIZED ANXIETY DISORDER: ICD-10-CM

## 2021-02-22 DIAGNOSIS — E03.9 HYPOTHYROIDISM, UNSPECIFIED TYPE: ICD-10-CM

## 2021-02-22 RX ORDER — HYDROCHLOROTHIAZIDE 50 MG/1
50 TABLET ORAL DAILY
Qty: 90 TABLET | Refills: 3 | Status: SHIPPED | OUTPATIENT
Start: 2021-02-22 | End: 2022-03-29 | Stop reason: SDUPTHER

## 2021-02-22 RX ORDER — FLUOXETINE HYDROCHLORIDE 20 MG/1
20 CAPSULE ORAL DAILY
Qty: 90 CAPSULE | Refills: 3 | Status: SHIPPED | OUTPATIENT
Start: 2021-02-22 | End: 2022-03-29 | Stop reason: SDUPTHER

## 2021-02-22 RX ORDER — LEVOTHYROXINE SODIUM 0.05 MG/1
50 TABLET ORAL DAILY
Qty: 90 TABLET | Refills: 3 | Status: SHIPPED | OUTPATIENT
Start: 2021-02-22 | End: 2021-09-01 | Stop reason: SDUPTHER

## 2021-06-03 ENCOUNTER — TELEPHONE (OUTPATIENT)
Dept: DERMATOLOGY | Facility: CLINIC | Age: 51
End: 2021-06-03

## 2021-06-09 ENCOUNTER — TELEPHONE (OUTPATIENT)
Dept: DERMATOLOGY | Facility: CLINIC | Age: 51
End: 2021-06-09

## 2021-06-09 NOTE — TELEPHONE ENCOUNTER
sw pts son who stated she was doing a /delivery and would have her cb   edmar     Need to change appt

## 2021-07-07 ENCOUNTER — TELEPHONE (OUTPATIENT)
Dept: FAMILY MEDICINE CLINIC | Facility: CLINIC | Age: 51
End: 2021-07-07

## 2021-08-09 ENCOUNTER — OFFICE VISIT (OUTPATIENT)
Dept: DERMATOLOGY | Facility: CLINIC | Age: 51
End: 2021-08-09
Payer: COMMERCIAL

## 2021-08-09 VITALS — HEIGHT: 66 IN | TEMPERATURE: 97.9 F | BODY MASS INDEX: 32.51 KG/M2 | WEIGHT: 202.3 LBS

## 2021-08-09 DIAGNOSIS — L91.8 SKIN TAG: ICD-10-CM

## 2021-08-09 DIAGNOSIS — L82.1 SEBORRHEIC KERATOSIS: ICD-10-CM

## 2021-08-09 DIAGNOSIS — L64.9 ANDROGENIC ALOPECIA: Primary | ICD-10-CM

## 2021-08-09 DIAGNOSIS — D18.01 CHERRY ANGIOMA: ICD-10-CM

## 2021-08-09 DIAGNOSIS — D22.5 MULTIPLE BENIGN NEVI OF UPPER EXTREMITY, LOWER EXTREMITY, AND TRUNK: ICD-10-CM

## 2021-08-09 DIAGNOSIS — D22.70 MULTIPLE BENIGN NEVI OF UPPER EXTREMITY, LOWER EXTREMITY, AND TRUNK: ICD-10-CM

## 2021-08-09 DIAGNOSIS — L81.4 SOLAR LENTIGO: ICD-10-CM

## 2021-08-09 DIAGNOSIS — D22.60 MULTIPLE BENIGN NEVI OF UPPER EXTREMITY, LOWER EXTREMITY, AND TRUNK: ICD-10-CM

## 2021-08-09 PROCEDURE — 99204 OFFICE O/P NEW MOD 45 MIN: CPT | Performed by: DERMATOLOGY

## 2021-08-09 PROCEDURE — 11200 RMVL SKIN TAGS UP TO&INC 15: CPT | Performed by: DERMATOLOGY

## 2021-08-09 NOTE — PATIENT INSTRUCTIONS
1  MELANOCYTIC NEVI ("Moles")    Assessment and Plan:  Based on a thorough discussion of this condition and the management approach to it (including a comprehensive discussion of the known risks, side effects and potential benefits of treatment), the patient (family) agrees to implement the following specific plan:   When outside we recommend using a wide brim hat, sunglasses, long sleeve and pants, sunscreen with SPF 00+ with reapplication every 2 hours, or SPF specific clothing    Benign, reassured   Annual skin check     Melanocytic Nevi  Melanocytic nevi ("moles") are tan or brown, raised or flat areas of the skin which have an increased number of melanocytes  Melanocytes are the cells in our body which make pigment and account for skin color  Some moles are present at birth (I e , "congenital nevi"), while others come up later in life (i e , "acquired nevi")  The sun can stimulate the body to make more moles  Sunburns are not the only thing that triggers more moles  Chronic sun exposure can do it too  Clinically distinguishing a healthy mole from melanoma may be difficult, even for experienced dermatologists  The "ABCDE's" of moles have been suggested as a means of helping to alert a person to a suspicious mole and the possible increased risk of melanoma  The suggestions for raising alert are as follows:    Asymmetry: Healthy moles tend to be symmetric, while melanomas are often asymmetric  Asymmetry means if you draw a line through the mole, the two halves do not match in color, size, shape, or surface texture  Asymmetry can be a result of rapid enlargement of a mole, the development of a raised area on a previously flat lesion, scaling, ulceration, bleeding or scabbing within the mole  Any mole that starts to demonstrate "asymmetry" should be examined promptly by a board certified dermatologist      Border: Healthy moles tend to have discrete, even borders    The border of a melanoma often blends into the normal skin and does not sharply delineate the mole from normal skin  Any mole that starts to demonstrate "uneven borders" should be examined promptly by a board certified dermatologist      Color: Healthy moles tend to be one color throughout  Melanomas tend to be made up of different colors ranging from dark black, blue, white, or red  Any mole that demonstrates a color change should be examined promptly by a board certified dermatologist      Diameter: Healthy moles tend to be smaller than 0 6 cm in size; an exception are "congenital nevi" that can be larger  Melanomas tend to grow and can often be greater than 0 6 cm (1/4 of an inch, or the size of a pencil eraser)  This is only a guideline, and many normal moles may be larger than 0 6 cm without being unhealthy  Any mole that starts to change in size (small to bigger or bigger to smaller) should be examined promptly by a board certified dermatologist      Evolving: Healthy moles tend to "stay the same "  Melanomas may often show signs of change or evolution such as a change in size, shape, color, or elevation  Any mole that starts to itch, bleed, crust, burn, hurt, or ulcerate or demonstrate a change or evolution should be examined promptly by a board certified dermatologist         2  LENTIGO    Assessment and Plan:  Based on a thorough discussion of this condition and the management approach to it (including a comprehensive discussion of the known risks, side effects and potential benefits of treatment), the patient (family) agrees to implement the following specific plan:   When outside we recommend using a wide brim hat, sunglasses, long sleeve and pants, sunscreen with SPF 76+ with reapplication every 2 hours, or SPF specific clothing       What is a lentigo? A lentigo is a pigmented flat or slightly raised lesion with a clearly defined edge  Unlike an ephelis (freckle), it does not fade in the winter months   There are several kinds of lentigo  The name lentigo originally referred to its appearance resembling a small lentil  The plural of lentigo is lentigines, although lentigos is also in common use  Who gets lentigines? Lentigines can affect males and females of all ages and races  Solar lentigines are especially prevalent in fair skinned adults  Lentigines associated with syndromes are present at birth or arise during childhood  What causes lentigines? Common forms of lentigo are due to exposure to ultraviolet radiation:   Sun damage including sunburn    Indoor tanning    Phototherapy, especially photochemotherapy (PUVA)    Ionizing radiation, eg radiation therapy, can also cause lentigines  Several familial syndromes associated with widespread lentigines originate from mutations in Lenin-MAP kinase, mTOR signaling and PTEN pathways  What is the treatment for lentigines? Most lentigines are left alone  Attempts to lighten them may not be successful  The following approaches are used:   SPF 50+ broad-spectrum sunscreen    Hydroquinone bleaching cream    Alpha hydroxy acids    Vitamin C    Retinoids    Azelaic acid    Chemical peels  Individual lesions can be permanently removed using:   Cryotherapy    Intense pulsed light    Pigment lasers    How can lentigines be prevented? Lentigines associated with exposure ultraviolet radiation can be prevented by very careful sun protection  Clothing is more successful at preventing new lentigines than are sunscreens  What is the outlook for lentigines? Lentigines usually persist  They may increase in number with age and sun exposure  Some in sun-protected sites may fade and disappear      3  DAN ANGIOMAS    Assessment and Plan:  Based on a thorough discussion of this condition and the management approach to it (including a comprehensive discussion of the known risks, side effects and potential benefits of treatment), the patient (family) agrees to implement the following specific plan:   Monitor for changes   Benign, reassured       Assessment and Plan:    Cherry angioma, also known as Katarina Floro spots, are benign vascular skin lesions  A "cherry angioma" is a firm red, blue or purple papule, 0 1-1 cm in diameter  When thrombosed, they can appear black in colour until evaluated with a dermatoscope when the red or purple colour is more easily seen  Cherry angioma may develop on any part of the body but most often appear on the scalp, face, lips and trunk  An angioma is due to proliferating endothelial cells; these are the cells that line the inside of a blood vessel  Angiomas can arise in early life or later in life; the most common type of angioma is a cherry angioma  Cherry angiomas are very common in males and females of any age or race  They are more noticeable in white skin than in skin of colour  They markedly increase in number from about the age of 36  There may be a family history of similar lesions  Eruptive cherry angiomas have been rarely reported to be associated with internal malignancy  The cause of angiomas is unknown  Genetic analysis of cherry angiomas has shown that they frequently carry specific somatic missense mutations in the GNAQ and GNA11 (Q209H) genes, which are involved in other vascular and melanocytic proliferations  4  SEBORRHEIC KERATOSIS; NON-INFLAMED    Assessment and Plan:  Based on a thorough discussion of this condition and the management approach to it (including a comprehensive discussion of the known risks, side effects and potential benefits of treatment), the patient (family) agrees to implement the following specific plan:   Monitor for changes   Benign, reassured       Seborrheic Keratosis  A seborrheic keratosis is a harmless warty spot that appears during adult life as a common sign of skin aging    Seborrheic keratoses can arise on any area of skin, covered or uncovered, with the usual exception of the palms and soles  They do not arise from mucous membranes  Seborrheic keratoses can have highly variable appearance  Seborrheic keratoses are extremely common  It has been estimated that over 90% of adults over the age of 61 years have one or more of them  They occur in males and females of all races, typically beginning to erupt in the 35s or 45s  They are uncommon under the age of 21 years  The precise cause of seborrhoeic keratoses is not known  Seborrhoeic keratoses are considered degenerative in nature  As time goes by, seborrheic keratoses tend to become more numerous  Some people inherit a tendency to develop a very large number of them; some people may have hundreds of them  There is no easy way to remove multiple lesions on a single occasion  Unless a specific lesion is "inflamed" and is causing pain or stinging/burning or is bleeding, most insurance companies do not authorize treatment  5  ANDROGENIC ALOPECIA    Assessment and Plan:  Based on a thorough discussion of this condition and the management approach to it (including a comprehensive discussion of the known risks, side effects and potential benefits of treatment), the patient (family) agrees to implement the following specific plan:    over the counter rogaine (minoxidil) 5% foam  Use one cap full a day on dry hair, part hair and apply directly to scalp  Do not do too close to bedtime  Need to do this daily  If you stop abruptly, you will lose hair  Can taper off if you don't like it  Could do laser therapy like laser caps or riddle (examples: capillus, laser hair max)  Buy over the counter, online  Cosmetic treatments like platelet rich plasma (PRP) do it monthly for 3 months  Hair transplant surgery   Hair fibers, put on topically so it looks more full (example topix)    What is androgenic alopecia? Androgenic alopecia is a common form of hair loss in both men and women   This form of hair loss affects an estimated 50 million men and 30 million women in the [place]  Androgenetic alopecia can start as early as a person's teens and risk increases with age; more than 48 percent of men over age 48 have some degree of hair loss  In women, hair loss is most likely after menopause  There are both genetic and environmental factors that contribute to androgenic alopecia  Many of these factors remain unknown  Researchers have determined that this form of hair loss is related to hormones called androgens  - Dihydrotestoerone is particularly implicated   - Increased androgen levels in hair follicles lead to shorter hair growth cycles and thinner strands of hair  There is also a delay in growth of new hair to replace shed strands  - Suspected gene involvement includes the AR gene that makes a protein called androgen receptor  The androgen receptors allow the body to repond to dihydrotestoerone and other androgens  - The vast majority of women affected by female pattern hair loss do not have underlying hormonal abnormalities, so other causes such as thyroid function must also be explored  The inheritance pattern of androgenic alopecia is unclear, but the condition tends to cluster in families  Having a close relative with patterned hair loss seems to be a risk factor for development of the condition  Androgenic alopecia in men has been associated with several other medical conditions including:   - Coronary heart disease and enlargement of the prostate    - Prostate cancer, disorders of insulin resistance (such as diabetes and obesity), and high blood pressure (hypertension) have been linked to androgenic alopecia    - In women, this form of hair loss is associated with an increased risk of  (PCOS)  PCOS is characterized by a hormonal imbalance that can lead to irregular menstruation, acne, excess hair elsewhere on the body (hirsutism), and weight gain  What are the symptoms of androgenic alopecia in men and women?     In men, this condition is also known as male-pattern baldness  Hair is lost in a typical pattern, beginning above both temples  Over time, the hairline recedes to form a characteristic "M" shape  Hair also thins at the crown (near the top of the head), often progressing to partial or complete baldness  The pattern of hair loss in women differs from male-pattern baldness  In women, the hair becomes thinner all over the head, and the hairline does not recede  Thinning hair occurs on the mid-frontal area of the scalp and is generally less severe than occurs in males, rarely resulting in total baldness  How do we diagnose androgenic alopecia? A careful history often suggests the underlying cause of alopecia  Crucial factors include the duration and pattern of hair loss, whether the hair is broken or shed at the roots, and whether shedding or thinning has increased  The patient's diet, medications, present and past medical conditions, and family history of alopecia are other important factors  The pull test is an easy technique for assessing hair loss  Approximately 60 hairs are grasped between the thumb and the index and middle fingers  The hairs are then gently but firmly pulled  A negative test (six or fewer hairs obtained) indicates normal shedding, whereas a positive test (more than six hairs obtained) indicates a process of active hair shedding  Patients should not shampoo their hair 24 hours before the test is performed  If the diagnosis is not clear based on the history and physical examination, selected laboratory tests and, occasionally, punch biopsy may be indicated  Common lab tests include testosterone levels, thyroid function tests, VDRL to rule out syphilis, ferritin levels for iron deficiency, and MARI test for lupus  How do we treat androgenic alopecia? The main goal of treatment is to slow or stop the progression of hair loss rather than promote hair regrowth   Results are variable and it is not possible to predict who may benefit from treatment  Options for treatment include:   o 2% minoxidil solution  This medication is applied with a dropper onto dry scale twice daily  Hands should be washed thoroughly to avoid inadvertent application to other parts of the body  It is a pregnancy category C drug and is not recommended for children younger than 25years of age    o Side effects include excessive hair growth, especially above the eyebrows and over the cheeks  It usually disappears after a year even with continued use of minoxidil  - Tretinoin (Retin-A) applied to the scalp as an adjunct to minoxidil has shown some benefits   - Women  o Estrogen therapy such as birth control pills that contain the least amount of progestin (Ortho-Cyclen, Ortho Tri-Cyclen, Ovcon 35, Mircette, Demulen, Zovia)  o Spironolactone (Aldactone) which is a weak inhibitor of androgen binding to androgen receptors  This is an off-label use of the drug   - Men  o Finasteride (Proscar) which inhibits the production of dihydrotestosterone  Other options to improve cosmetic appearance can include wearing wigs and hair transplantation  6  ACROCHORDON ("SKIN TAG")    Assessment and Plan:  Based on a thorough discussion of this condition and the management approach to it (including a comprehensive discussion of the known risks, side effects and potential benefits of treatment), the patient (family) agrees to implement the following specific plan:   Cryotherapy done in office today  Skin tags are common, soft, harmless skin lesions that are also called, in the appropriate settings, papillomas, fibroepithelial polyps, and soft fibromas  They are made up of loosely arranged collagen fibers and blood vessels surrounded by a thickened or thinned-out epidermis  Skin tags tend to develop in both men and women as we grow older  They are usually found on the skin folds (neck, armpits, groin)    It is not known what specifically causes skin tags  Certain factors, though, do appear to play a role:   Chaffing and irritation from skin rubbing together   High levels of growth factors (as seen, for example, in pregnancy or in acromegaly/gigantism)   Insulin resistance   Human papillomavirus (wart virus)    We discussed that most skin tags do not need to be treated unless they are specifically causing the patient physical distress or limitation or pose a risk for a larger problem such as an infection that forms secondary to excoriation or chronic irritation      We had a thorough discussion of treatment options and specific risks (including that any procedural treatment may not be covered by insurance and would then be the patient's responsibility) and benefits/alternatives including but not limited to the following:   Cryotherapy (freezing)   Shave removal   Surgical excision (snip excision with scissors)   Electrosurgery   Ligation (we do not do this procedure and counseled against it due to risk of tissue necrosis and infection)

## 2021-08-09 NOTE — PROGRESS NOTES
[facility] Note     Patient Name: Adelina Tripp  Encounter Date: 08/09/2021     Have you been cared for by a [facility] Dermatologist in the last 3 years and, if so, which one? No    · Have you traveled outside of the [address] [place] [address] in the past 3 months or outside of the [facility] area in the last 2 weeks? No     May we call your Preferred Phone number to discuss your specific medical information? Yes     May we leave a detailed message that includes your specific medical information? Yes      Today's Chief Concerns:   Concern #1:  Skin check     Past Medical History:  Have you personally ever had or currently have any of the following? · Skin cancer (such as Melanoma, Basal Cell Carcinoma, Squamous Cell Carcinoma? (If Yes, please provide more detail)- No  · Eczema: No  · Psoriasis: No  · HIV/AIDS: No  · Hepatitis B or C: No  · Tuberculosis: No  · Systemic Immunosuppression such as Diabetes, Biologic or Immunotherapy, Chemotherapy, Organ Transplantation, Bone Marrow Transplantation (If YES, please provide more detail): No  · Radiation Treatment (If YES, please provide more detail): No  · Any other major medical conditions/concerns? 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 YES, Mother Basal Cell Carcinoma   · Eczema, Asthma, Hay Fever or Seasonal Allergies: No  · Psoriasis or Psoriatic Arthritis: No  · Do any other medical conditions seem to run in your family? If Yes, what condition and which relatives?   YES, Father- heart attack     Current Medications:       Current Outpatient Medications:     acetaminophen (TYLENOL) 325 mg tablet, Take by mouth as needed , Disp: , Rfl:     atorvastatin (LIPITOR) 10 mg tablet, Take 1 tablet (10 mg total) by mouth daily, Disp: 90 tablet, Rfl: 3    carboxymethylcellulose (REFRESH PLUS) 0 5 % SOLN, Apply to eye, Disp: , Rfl:     FLUoxetine (PROzac) 20 mg capsule, Take 1 capsule (20 mg total) by mouth daily, Disp: 90 capsule, Rfl: 3    hydrochlorothiazide (HYDRODIURIL) 50 mg tablet, Take 1 tablet (50 mg total) by mouth daily, Disp: 90 tablet, Rfl: 3    levothyroxine 50 mcg tablet, Take 1 tablet (50 mcg total) by mouth daily, Disp: 90 tablet, Rfl: 3    loteprednol etabonate (LOTEMAX) 0 5 % ophth gel, Administer 1 drop to the right eye Every other day, Disp: , Rfl:     Elastic Bandages & Supports (MEDICAL COMPRESSION STOCKINGS) MISC, Use daily as directed  20-30 compression  , Disp: 6 each, Rfl: 0    pseudoephedrine (SUDAFED) 30 mg tablet, Take 60 mg by mouth every 4 (four) hours as needed for congestion, Disp: , Rfl:     timolol (TIMOPTIC) 0 5 % ophthalmic solution, 1 drop In rt eye nightly, Disp: , Rfl:       Review of Systems:  Have you recently had or currently have any of the following? If YES, what are you doing for the problem? · Fever, chills or unintended weight loss: No  · Sudden loss or change in your vision: No  · Nausea, vomiting or blood in your stool: No  · Painful or swollen joints: No  · Wheezing or cough: No  · Changing mole or non-healing wound: No  · Nosebleeds: No  · Excessive sweating: No  · Easy or prolonged bleeding? No  · Over the last 2 weeks, how often have you been bothered by the following problems? · Taking little interest or pleasure in doing things: 1 - Not at All  · Feeling down, depressed, or hopeless: 1 - Not at All  · Rapid heartbeat with epinephrine:  No    · FEMALES ONLY:    · Are you pregnant or planning to become pregnant? No  · Are you currently or planning to be nursing or breast feeding? No    · Any known allergies?       Allergies   Allergen Reactions    Sulfa Antibiotics  Sulfa Antibiotics Rash         Physical Exam:     Was a chaperone (Derm Clinical Assistant) present throughout the entire Physical Exam? Yes     Did the Dermatology Team specifically  the patient on the importance of a Full Skin Exam to be sure that nothing is missed clinically? Yes}  o Did the patient ultimately request or accept a Full Skin Exam?  Yes  o Did the patient specifically refuse to have the areas "under-the-bra" examined by the Dermatologist? Armond Alvarado  o Did the patient specifically refuse to have the areas "under-the-underwear" examined by the Dermatologist? YES    CONSTITUTIONAL:   Vitals:    08/09/21 1624   Temp: 97 9 °F (36 6 °C)   TempSrc: Tympanic   Weight: 91 8 kg (202 lb 4 8 oz)   Height: 5' 6" (1 676 m)       PSYCH: Normal mood and affect  EYES: Normal conjunctiva  ENT: Normal lips and oral mucosa  CARDIOVASCULAR: No edema  RESPIRATORY: Normal respirations  HEME/LYMPH/IMMUNO:  No regional lymphadenopathy except as noted below in "ASSESSMENT AND PLAN BY DIAGNOSIS"    SKIN:  FULL ORGAN SYSTEM EXAM  Hair, Scalp, Ears, Face Normal except as noted below in Assessment   Neck, Cervical Chain Nodes Normal except as noted below in Assessment   Right Arm/Hand/Fingers Normal except as noted below in Assessment   Left Arm/Hand/Fingers Normal except as noted below in Assessment   Chest/Axillae Viewed areas Normal except as noted below in Assessment   Abdomen, Umbilicus Normal except as noted below in Assessment   Back/Spine Normal except as noted below in Assessment   Right Leg, Foot, Toes Normal except as noted below in Assessment   Left Leg, Foot, Toes Normal except as noted below in Assessment        Assessment and Plan by Diagnosis:    1   MELANOCYTIC NEVI ("Moles")    Physical Exam:   Anatomic Location Affected:   Mostly on sun-exposed areas of the trunk and extremities   Morphological Description:  Scattered, 1-4mm round to ovoid, symmetrical-appearing, even bordered, skin colored to dark brown macules/papules, mostly in sun-exposed areas   Pertinent Positives:   Pertinent Negatives: Additional History of Present Condition:      Assessment and Plan:  Based on a thorough discussion of this condition and the management approach to it (including a comprehensive discussion of the known risks, side effects and potential benefits of treatment), the patient (family) agrees to implement the following specific plan:   When outside we recommend using a wide brim hat, sunglasses, long sleeve and pants, sunscreen with SPF 58+ with reapplication every 2 hours, or SPF specific clothing    Benign, reassured   Annual skin check     Melanocytic Nevi  Melanocytic nevi ("moles") are tan or brown, raised or flat areas of the skin which have an increased number of melanocytes  Melanocytes are the cells in our body which make pigment and account for skin color  Some moles are present at birth (I e , "congenital nevi"), while others come up later in life (i e , "acquired nevi")  The sun can stimulate the body to make more moles  Sunburns are not the only thing that triggers more moles  Chronic sun exposure can do it too  Clinically distinguishing a healthy mole from melanoma may be difficult, even for experienced dermatologists  The "ABCDE's" of moles have been suggested as a means of helping to alert a person to a suspicious mole and the possible increased risk of melanoma  The suggestions for raising alert are as follows:    Asymmetry: Healthy moles tend to be symmetric, while melanomas are often asymmetric  Asymmetry means if you draw a line through the mole, the two halves do not match in color, size, shape, or surface texture  Asymmetry can be a result of rapid enlargement of a mole, the development of a raised area on a previously flat lesion, scaling, ulceration, bleeding or scabbing within the mole    Any mole that starts to demonstrate "asymmetry" should be examined promptly by a board certified dermatologist      Jm Andersen: Healthy moles tend to have discrete, even borders  The border of a melanoma often blends into the normal skin and does not sharply delineate the mole from normal skin  Any mole that starts to demonstrate "uneven borders" should be examined promptly by a board certified dermatologist      Color: Healthy moles tend to be one color throughout  Melanomas tend to be made up of different colors ranging from dark black, blue, white, or red  Any mole that demonstrates a color change should be examined promptly by a board certified dermatologist      Diameter: Healthy moles tend to be smaller than 0 6 cm in size; an exception are "congenital nevi" that can be larger  Melanomas tend to grow and can often be greater than 0 6 cm (1/4 of an inch, or the size of a pencil eraser)  This is only a guideline, and many normal moles may be larger than 0 6 cm without being unhealthy  Any mole that starts to change in size (small to bigger or bigger to smaller) should be examined promptly by a board certified dermatologist      Evolving: Healthy moles tend to "stay the same "  Melanomas may often show signs of change or evolution such as a change in size, shape, color, or elevation  Any mole that starts to itch, bleed, crust, burn, hurt, or ulcerate or demonstrate a change or evolution should be examined promptly by a board certified dermatologist         2  LENTIGO    Physical Exam:   Anatomic Location Affected:  Face, Trunk and extremities    Morphological Description:  Light brown macules   Pertinent Positives:   Pertinent Negatives:     Additional History of Present Condition:      Assessment and Plan:  Based on a thorough discussion of this condition and the management approach to it (including a comprehensive discussion of the known risks, side effects and potential benefits of treatment), the patient (family) agrees to implement the following specific plan:   When outside we recommend using a wide brim hat, sunglasses, long sleeve and pants, sunscreen with SPF 27+ with reapplication every 2 hours, or SPF specific clothing       What is a lentigo? A lentigo is a pigmented flat or slightly raised lesion with a clearly defined edge  Unlike an ephelis (freckle), it does not fade in the winter months  There are several kinds of lentigo  The name lentigo originally referred to its appearance resembling a small lentil  The plural of lentigo is lentigines, although lentigos is also in common use  Who gets lentigines? Lentigines can affect males and females of all ages and races  Solar lentigines are especially prevalent in fair skinned adults  Lentigines associated with syndromes are present at birth or arise during childhood  What causes lentigines? Common forms of lentigo are due to exposure to ultraviolet radiation:   Sun damage including sunburn    Indoor tanning    Phototherapy, especially photochemotherapy (PUVA)    Ionizing radiation, eg radiation therapy, can also cause lentigines  Several familial syndromes associated with widespread lentigines originate from mutations in Lenin-MAP kinase, mTOR signaling and PTEN pathways  What is the treatment for lentigines? Most lentigines are left alone  Attempts to lighten them may not be successful  The following approaches are used:   SPF 50+ broad-spectrum sunscreen    Hydroquinone bleaching cream    Alpha hydroxy acids    Vitamin C    Retinoids    Azelaic acid    Chemical peels  Individual lesions can be permanently removed using:   Cryotherapy    Intense pulsed light    Pigment lasers    How can lentigines be prevented? Lentigines associated with exposure ultraviolet radiation can be prevented by very careful sun protection  Clothing is more successful at preventing new lentigines than are sunscreens  What is the outlook for lentigines? Lentigines usually persist  They may increase in number with age and sun exposure   Some in sun-protected sites may fade and disappear  3  DAN ANGIOMAS    Physical Exam:   Anatomic Location Affected:  trunk   Morphological Description:  Scattered cherry red, 1-4 mm papules   Pertinent Positives:   Pertinent Negatives: Additional History of Present Condition:      Assessment and Plan:  Based on a thorough discussion of this condition and the management approach to it (including a comprehensive discussion of the known risks, side effects and potential benefits of treatment), the patient (family) agrees to implement the following specific plan:   Monitor for changes   Benign, reassured       Assessment and Plan:    Cherry angioma, also known as [employer] spots, are benign vascular skin lesions  A "cherry angioma" is a firm red, blue or purple papule, 0 1-1 cm in diameter  When thrombosed, they can appear black in colour until evaluated with a dermatoscope when the red or purple colour is more easily seen  Cherry angioma may develop on any part of the body but most often appear on the scalp, face, lips and trunk  An angioma is due to proliferating endothelial cells; these are the cells that line the inside of a blood vessel  Angiomas can arise in early life or later in life; the most common type of angioma is a cherry angioma  Cherry angiomas are very common in males and females of any age or race  They are more noticeable in white skin than in skin of colour  They markedly increase in number from about the age of 36  There may be a family history of similar lesions  Eruptive cherry angiomas have been rarely reported to be associated with internal malignancy  The cause of angiomas is unknown  Genetic analysis of cherry angiomas has shown that they frequently carry specific somatic missense mutations in the GNAQ and GNA11 (Q209H) genes, which are involved in other vascular and melanocytic proliferations        4  SEBORRHEIC KERATOSIS; NON-INFLAMED    Physical Exam:   Anatomic Location Affected:  trunk   Morphological Description:  Flat and raised, waxy, smooth to warty textured, yellow to brownish-grey to dark brown to blackish, discrete, "stuck-on" appearing papules   Pertinent Positives:   Pertinent Negatives: Additional History of Present Condition:      Assessment and Plan:  Based on a thorough discussion of this condition and the management approach to it (including a comprehensive discussion of the known risks, side effects and potential benefits of treatment), the patient (family) agrees to implement the following specific plan:   Monitor for changes   Benign, reassured       Seborrheic Keratosis  A seborrheic keratosis is a harmless warty spot that appears during adult life as a common sign of skin aging  Seborrheic keratoses can arise on any area of skin, covered or uncovered, with the usual exception of the palms and soles  They do not arise from mucous membranes  Seborrheic keratoses can have highly variable appearance  Seborrheic keratoses are extremely common  It has been estimated that over 90% of adults over the age of 61 years have one or more of them  They occur in males and females of all races, typically beginning to erupt in the 35s or 45s  They are uncommon under the age of 21 years  The precise cause of seborrhoeic keratoses is not known  Seborrhoeic keratoses are considered degenerative in nature  As time goes by, seborrheic keratoses tend to become more numerous  Some people inherit a tendency to develop a very large number of them; some people may have hundreds of them  There is no easy way to remove multiple lesions on a single occasion  Unless a specific lesion is "inflamed" and is causing pain or stinging/burning or is bleeding, most insurance companies do not authorize treatment  5  ANDROGENIC ALOPECIA    Physical Exam:   Anatomic Location Affected:   Top of scalp   Morphological Description:  Decreased hair density increased variability    Pertinent Positives:   Pertinent Negatives: Additional History of Present Condition:  Patient states that ever since she had her son in 2003 the front of her scalp and her part has been thinning  She states that she has not treated it yet with anything prescribed she has tried some over the counter shampoos  Patient states that her grandmother has thin hair as well  Assessment and Plan:  Based on a thorough discussion of this condition and the management approach to it (including a comprehensive discussion of the known risks, side effects and potential benefits of treatment), the patient (family) agrees to implement the following specific plan:    over the counter rogaine (minoxidil) 5% foam  Use one cap full a day on dry hair, part hair and apply directly to scalp  Do not do too close to bedtime  Need to do this daily  If you stop abruptly, you will lose hair  Can taper off if you don't like it  Could do laser therapy like laser caps or riddle (examples: capillus, laser hair max)  Buy over the counter, online  Cosmetic treatments like platelet rich plasma (PRP) do it monthly for 3 months  Hair transplant surgery   Hair fibers, put on topically so it looks more full (example topix)    What is androgenic alopecia? Androgenic alopecia is a common form of hair loss in both men and women  This form of hair loss affects an estimated 50 million men and 30 million women in the [place]  Androgenetic alopecia can start as early as a person's teens and risk increases with age; more than 48 percent of men over age 48 have some degree of hair loss  In women, hair loss is most likely after menopause  There are both genetic and environmental factors that contribute to androgenic alopecia  Many of these factors remain unknown  Researchers have determined that this form of hair loss is related to hormones called androgens     - Dihydrotestoerone is particularly implicated   - Increased androgen levels in hair follicles lead to shorter hair growth cycles and thinner strands of hair  There is also a delay in growth of new hair to replace shed strands  - Suspected gene involvement includes the AR gene that makes a protein called androgen receptor  The androgen receptors allow the body to repond to dihydrotestoerone and other androgens  - The vast majority of women affected by female pattern hair loss do not have underlying hormonal abnormalities, so other causes such as thyroid function must also be explored  The inheritance pattern of androgenic alopecia is unclear, but the condition tends to cluster in families  Having a close relative with patterned hair loss seems to be a risk factor for development of the condition  Androgenic alopecia in men has been associated with several other medical conditions including:   - Coronary heart disease and enlargement of the prostate    - Prostate cancer, disorders of insulin resistance (such as diabetes and obesity), and high blood pressure (hypertension) have been linked to androgenic alopecia    - In women, this form of hair loss is associated with an increased risk of  (PCOS)  PCOS is characterized by a hormonal imbalance that can lead to irregular menstruation, acne, excess hair elsewhere on the body (hirsutism), and weight gain  What are the symptoms of androgenic alopecia in men and women? In men, this condition is also known as male-pattern baldness  Hair is lost in a typical pattern, beginning above both temples  Over time, the hairline recedes to form a characteristic "M" shape  Hair also thins at the crown (near the top of the head), often progressing to partial or complete baldness  The pattern of hair loss in women differs from male-pattern baldness  In women, the hair becomes thinner all over the head, and the hairline does not recede   Thinning hair occurs on the mid-frontal area of the scalp and is generally less severe than occurs in males, rarely resulting in total baldness  How do we diagnose androgenic alopecia? A careful history often suggests the underlying cause of alopecia  Crucial factors include the duration and pattern of hair loss, whether the hair is broken or shed at the roots, and whether shedding or thinning has increased  The patient's diet, medications, present and past medical conditions, and family history of alopecia are other important factors  The pull test is an easy technique for assessing hair loss  Approximately 60 hairs are grasped between the thumb and the index and middle fingers  The hairs are then gently but firmly pulled  A negative test (six or fewer hairs obtained) indicates normal shedding, whereas a positive test (more than six hairs obtained) indicates a process of active hair shedding  Patients should not shampoo their hair 24 hours before the test is performed  If the diagnosis is not clear based on the history and physical examination, selected laboratory tests and, occasionally, punch biopsy may be indicated  Common lab tests include testosterone levels, thyroid function tests, VDRL to rule out syphilis, ferritin levels for iron deficiency, and MARI test for lupus  How do we treat androgenic alopecia? The main goal of treatment is to slow or stop the progression of hair loss rather than promote hair regrowth  Results are variable and it is not possible to predict who may benefit from treatment  Options for treatment include:   o 2% minoxidil solution  This medication is applied with a dropper onto dry scale twice daily  Hands should be washed thoroughly to avoid inadvertent application to other parts of the body  It is a pregnancy category C drug and is not recommended for children younger than 25years of age    o Side effects include excessive hair growth, especially above the eyebrows and over the cheeks   It usually disappears after a year even with continued use of minoxidil  - Tretinoin (Retin-A) applied to the scalp as an adjunct to minoxidil has shown some benefits   - Women  o Estrogen therapy such as birth control pills that contain the least amount of progestin (Ortho-Cyclen, Ortho Tri-Cyclen, Ovcon 35, Mircette, Demulen, Zovia)  o Spironolactone (Aldactone) which is a weak inhibitor of androgen binding to androgen receptors  This is an off-label use of the drug   - Men  o Finasteride (Proscar) which inhibits the production of dihydrotestosterone  Other options to improve cosmetic appearance can include wearing wigs and hair transplantation  6  ACROCHORDON ("SKIN TAG")    Physical Exam:   Anatomic Location Affected:  Right neck    Morphological Description:  pedunculated papule    Pertinent Positives:   Pertinent Negatives: Additional History of Present Condition:  Patient states that she has a skin tag in her right neck that constantly gets caught in her necklace and bleeds  Assessment and Plan:  Based on a thorough discussion of this condition and the management approach to it (including a comprehensive discussion of the known risks, side effects and potential benefits of treatment), the patient (family) agrees to implement the following specific plan:   Cryotherapy done in office today  Skin tags are common, soft, harmless skin lesions that are also called, in the appropriate settings, papillomas, fibroepithelial polyps, and soft fibromas  They are made up of loosely arranged collagen fibers and blood vessels surrounded by a thickened or thinned-out epidermis  Skin tags tend to develop in both men and women as we grow older  They are usually found on the skin folds (neck, armpits, groin)  It is not known what specifically causes skin tags    Certain factors, though, do appear to play a role:   Chaffing and irritation from skin rubbing together   High levels of growth factors (as seen, for example, in pregnancy or in acromegaly/gigantism)   Insulin resistance   Human papillomavirus (wart virus)    We discussed that most skin tags do not need to be treated unless they are specifically causing the patient physical distress or limitation or pose a risk for a larger problem such as an infection that forms secondary to excoriation or chronic irritation  We had a thorough discussion of treatment options and specific risks (including that any procedural treatment may not be covered by insurance and would then be the patient's responsibility) and benefits/alternatives including but not limited to the following:   Cryotherapy (freezing)   Shave removal   Surgical excision (snip excision with scissors)   Electrosurgery   Ligation (we do not do this procedure and counseled against it due to risk of tissue necrosis and infection)    PROCEDURE:  DESTRUCTION OF BENIGN LESIONS  After a thorough discussion of treatment options and risk/benefits/alternatives (including but not limited to local pain, scarring, dyspigmentation, blistering, and possible superinfection), verbal and written consent were obtained and the aforementioned lesions were treated on with cryotherapy using liquid nitrogen x 3 cycle for 5-10 seconds   TOTAL NUMBER of 1 lesions were treated today on the ANATOMIC LOCATION: right neck  The patient tolerated the procedure well, and after-care instructions were provided      Scribe Attestation    I,:  Kim Staley MA am acting as a scribe while in the presence of the attending physician :       I,:  Oneyda Mercedes MD personally performed the services described in this documentation    as scribed in my presence :

## 2021-09-01 ENCOUNTER — OFFICE VISIT (OUTPATIENT)
Dept: FAMILY MEDICINE CLINIC | Facility: CLINIC | Age: 51
End: 2021-09-01
Payer: COMMERCIAL

## 2021-09-01 VITALS
WEIGHT: 204.4 LBS | BODY MASS INDEX: 32.85 KG/M2 | HEART RATE: 80 BPM | DIASTOLIC BLOOD PRESSURE: 70 MMHG | TEMPERATURE: 97.2 F | HEIGHT: 66 IN | OXYGEN SATURATION: 98 % | SYSTOLIC BLOOD PRESSURE: 118 MMHG

## 2021-09-01 DIAGNOSIS — E66.9 CLASS 1 OBESITY: ICD-10-CM

## 2021-09-01 DIAGNOSIS — E03.9 HYPOTHYROIDISM, UNSPECIFIED TYPE: ICD-10-CM

## 2021-09-01 DIAGNOSIS — E78.5 DYSLIPIDEMIA: ICD-10-CM

## 2021-09-01 DIAGNOSIS — R10.11 RIGHT UPPER QUADRANT PAIN: ICD-10-CM

## 2021-09-01 DIAGNOSIS — I10 ESSENTIAL HYPERTENSION: ICD-10-CM

## 2021-09-01 DIAGNOSIS — Z23 NEED FOR VACCINATION: ICD-10-CM

## 2021-09-01 DIAGNOSIS — Z00.00 ENCOUNTER FOR ANNUAL PHYSICAL EXAM: Primary | ICD-10-CM

## 2021-09-01 LAB
ANION GAP SERPL CALCULATED.3IONS-SCNC: 7 MMOL/L (ref 4–13)
BUN SERPL-MCNC: 19 MG/DL (ref 5–25)
CALCIUM SERPL-MCNC: 9.2 MG/DL (ref 8.3–10.1)
CHLORIDE SERPL-SCNC: 100 MMOL/L (ref 100–108)
CHOLEST SERPL-MCNC: 208 MG/DL (ref 50–200)
CO2 SERPL-SCNC: 28 MMOL/L (ref 21–32)
CREAT SERPL-MCNC: 0.91 MG/DL (ref 0.6–1.3)
CREAT UR-MCNC: 58.4 MG/DL
EST. AVERAGE GLUCOSE BLD GHB EST-MCNC: 108 MG/DL
GFR SERPL CREATININE-BSD FRML MDRD: 73 ML/MIN/1.73SQ M
GLUCOSE P FAST SERPL-MCNC: 93 MG/DL (ref 65–99)
HBA1C MFR BLD: 5.4 %
HCV AB SER QL: NORMAL
HDLC SERPL-MCNC: 39 MG/DL
LDLC SERPL CALC-MCNC: 134 MG/DL (ref 0–100)
MICROALBUMIN UR-MCNC: 11.4 MG/L (ref 0–20)
MICROALBUMIN/CREAT 24H UR: 20 MG/G CREATININE (ref 0–30)
POTASSIUM SERPL-SCNC: 3.6 MMOL/L (ref 3.5–5.3)
SODIUM SERPL-SCNC: 135 MMOL/L (ref 136–145)
TRIGL SERPL-MCNC: 173 MG/DL
TSH SERPL DL<=0.05 MIU/L-ACNC: 4.07 UIU/ML (ref 0.36–3.74)

## 2021-09-01 PROCEDURE — 86803 HEPATITIS C AB TEST: CPT | Performed by: FAMILY MEDICINE

## 2021-09-01 PROCEDURE — 90471 IMMUNIZATION ADMIN: CPT

## 2021-09-01 PROCEDURE — 84443 ASSAY THYROID STIM HORMONE: CPT | Performed by: FAMILY MEDICINE

## 2021-09-01 PROCEDURE — 80048 BASIC METABOLIC PNL TOTAL CA: CPT | Performed by: FAMILY MEDICINE

## 2021-09-01 PROCEDURE — 80061 LIPID PANEL: CPT | Performed by: FAMILY MEDICINE

## 2021-09-01 PROCEDURE — 82043 UR ALBUMIN QUANTITATIVE: CPT | Performed by: FAMILY MEDICINE

## 2021-09-01 PROCEDURE — 83036 HEMOGLOBIN GLYCOSYLATED A1C: CPT | Performed by: FAMILY MEDICINE

## 2021-09-01 PROCEDURE — 82570 ASSAY OF URINE CREATININE: CPT | Performed by: FAMILY MEDICINE

## 2021-09-01 PROCEDURE — 36415 COLL VENOUS BLD VENIPUNCTURE: CPT | Performed by: FAMILY MEDICINE

## 2021-09-01 PROCEDURE — 87389 HIV-1 AG W/HIV-1&-2 AB AG IA: CPT | Performed by: FAMILY MEDICINE

## 2021-09-01 PROCEDURE — 99396 PREV VISIT EST AGE 40-64: CPT | Performed by: FAMILY MEDICINE

## 2021-09-01 PROCEDURE — 90750 HZV VACC RECOMBINANT IM: CPT

## 2021-09-01 RX ORDER — ATORVASTATIN CALCIUM 10 MG/1
10 TABLET, FILM COATED ORAL DAILY
Qty: 90 TABLET | Refills: 3 | Status: SHIPPED | OUTPATIENT
Start: 2021-09-01

## 2021-09-01 RX ORDER — LEVOTHYROXINE SODIUM 0.05 MG/1
50 TABLET ORAL DAILY
Qty: 90 TABLET | Refills: 3 | Status: SHIPPED | OUTPATIENT
Start: 2021-09-01

## 2021-09-01 NOTE — PROGRESS NOTES
ADULT ANNUAL [address] [place] [address] Street    NAME: Arlyn Coyle  AGE: 46 y o  SEX: female  : 1970     DATE: 2021     Assessment and Plan:     Problem List Items Addressed This Visit        Endocrine    Hypothyroidism     - Continue levothyroxine 50 mcg daily   - Repeat TSH         Relevant Medications    levothyroxine 50 mcg tablet    Other Relevant Orders    TSH, 3rd generation       Cardiovascular and Mediastinum    Essential hypertension     - Currently blood pressure is controlled at this time on hydrochlorothiazide 50 mg daily Continue to maintain healthy balanced diet with focus on low salt intake  Limit alcohol intake  - BMP and urine microalbumin/creatinine ordered           Relevant Orders    Basic metabolic panel    Microalbumin / creatinine urine ratio       Other    Dyslipidemia     - Continue atorvastatin 10mg daily   - Repeat lipid panel ordered          Relevant Medications    atorvastatin (LIPITOR) 10 mg tablet    Other Relevant Orders    Lipid Panel with Direct LDL reflex    Class 1 obesity     - Counseled patient on the importance of working to achieve weight reduction goal   - Discussed role that balanced diet and daily activity play in weight reduction    - Exercise should be 30 minutes 5 times weekly of moderate intensity activity- brisk walking acceptable  Recommended diet include mediterranean and DASH  Primary focus should be unprocessed foods, fruits, vegetables, plant based fats and protein, legumes, whole grain and nuts  Vegetables and fruit should make up 1/2 each meal  Limit red meats         Relevant Orders    Ambulatory referral to Weight Management    Encounter for annual physical exam - Primary     - Will screen for Hepatitis C per USPSTF recommendations to test adults aged 25 to 78 years    - Will screen for HIV per USPSTF recommendations to test individuals aged 15-65 years   - Most recent mammogram was January 2021 and last pap smear was in 2016  She does have an upcoming appointment with OB/GYN    - Shingrix vaccination administered at today's office; patient to return in 6 months for 2 dose  - Order for cologuard has already been placed however patient is yet to do this; advised to have this done at earliest convenience  Relevant Orders    Hemoglobin A1C    Hepatitis C antibody    HIV 1/2 Antigen/Antibody (4th Generation) w Reflex [facility]    Right upper quadrant pain     - Given chronicity of symptoms will order right upper quadrant to rule out gallbladder/liver pathology  Relevant Orders    US right upper quadrant      Other Visit Diagnoses     Need for vaccination        Relevant Orders    Zoster Vaccine Recombinant IM (Completed)          Immunizations and preventive care screenings were discussed with patient today  Appropriate education was printed on patient's after visit summary  Counseling:  · Exercise: the importance of regular exercise/physical activity was discussed  Recommend exercise 3-5 times per week for at least 30 minutes  Return in about 1 year (around 9/1/2022), or if symptoms worsen or fail to improve  Chief Complaint:     Chief Complaint   Patient presents with    Physical Exam     Caring Starts With You      History of Present Illness:     Adult Annual Physical   [place] Che Moore is a pleasant 46year old female with a past medical history of hypertension, obesity, obstructive sleep apnea, hypothyroidism and dyslipidemia who is here for a comprehensive physical exam  Patient feels well however states that she has gained weight  She was previously in a weight management program which helped her lose weight and is interested in starting this again  She also reports right upper quadrant pain for the past 6 months which is reproducible by touch and is not related to food       Diet and Physical Activity  · Diet/Nutrition: Patient admits to not adhering to a balanced diet   · Exercise: no formal exercise  Depression Screening  PHQ-9 Depression Screening    PHQ-9:   Frequency of the following problems over the past two weeks:      Little interest or pleasure in doing things: 0 - not at all  Feeling down, depressed, or hopeless: 0 - not at all  PHQ-2 Score: 0       General Health  · Sleep: Patients gets 8 hours of sleep a night and wears a CPAP  · Hearing: No hearing issues  · Vision: Patient has a history of cataracts and corneal transplant surgery  She follows with Opthalmology regularly and has an upcoming apppointment in October      · Dental: Patient will make an appointment to see a dentist         [address]  · Patient is: postmenopausal  · Contraceptive method: None  Review of Systems:     Review of Systems   Constitutional:        Weight gain   HENT: Negative  Eyes: Negative  Respiratory: Negative  Cardiovascular: Negative  Gastrointestinal: Positive for abdominal pain  Genitourinary: Negative  Musculoskeletal: Negative  Skin: Negative  Neurological: Negative  Psychiatric/Behavioral: Negative         Past Medical History:     Past Medical History:   Diagnosis Date    Axenfeld-Dora syndrome     Disease of thyroid gland     Herniated nucleus pulposus, L5-S1     last assesed [id number]    Hypertension     Sleep apnea       Past Surgical History:     Past Surgical History:   Procedure Laterality Date    CATARACT EXTRACTION, BILATERAL Bilateral     CLOSED REDUCTION ELBOW DISLOCATION      CORNEAL TRANSPLANT      WISDOM TOOTH EXTRACTION        Social History:     Social History     Socioeconomic History    Marital status: /Civil Union     Spouse name: None    Number of children: None    Years of education: None    Highest education level: None   Occupational History    None   Tobacco Use    Smoking status: Never Smoker    Smokeless tobacco: Never Used   Vaping Use    Vaping Use: Never used   Substance and Sexual Activity    Alcohol use: Yes     Comment: socially    Drug use: No    Sexual activity: None   Other Topics Concern    None   Social History Narrative    caffeine use     Social Determinants of Health     Financial Resource Strain:     Difficulty of Paying Living Expenses:    Food Insecurity:     Worried About Running Out of Food in the Last Year:     [address] in the Last Year:    Transportation Needs:     Lack of Transportation (Medical):      Lack of Transportation (Non-Medical):    Physical Activity:     Days of Exercise per Week:     Minutes of Exercise per Session:    Stress:     Feeling of Stress :    Social Connections:     Frequency of Communication with Friends and Family:     Frequency of Social Gatherings with Friends and Family:     Attends [employer] Services:     Active Member of Clubs or Organizations:     Attends Club or Organization Meetings:     Marital Status:    Intimate Partner Violence:     Fear of Current or Ex-Partner:     Emotionally Abused:     Physically Abused:     Sexually Abused:       Family History:     Family History   Problem Relation Age of Onset    Atrial fibrillation Mother     Stroke Mother     Hypertension Mother     Supraventricular tachycardia Mother     Rheum arthritis Mother     Heart attack Father     Heart disease Father     No Known Problems Sister     No Known Problems Daughter     No Known Problems Sister     No Known Problems Daughter     No Known Problems Paternal Aunt     No Known Problems Paternal Aunt     No Known Problems Paternal Aunt     No Known Problems Paternal Aunt     No Known Problems Paternal Aunt     Cancer Neg Hx     Diabetes Neg Hx     Thyroid disease Neg Hx       Current Medications:     Current Outpatient Medications   Medication Sig Dispense Refill    acetaminophen (TYLENOL) 325 mg tablet Take by mouth as needed       atorvastatin (LIPITOR) 10 mg tablet Take 1 tablet (10 mg total) by mouth daily 90 tablet 3    carboxymethylcellulose (REFRESH PLUS) 0 5 % SOLN Apply to eye      FLUoxetine (PROzac) 20 mg capsule Take 1 capsule (20 mg total) by mouth daily 90 capsule 3    hydrochlorothiazide (HYDRODIURIL) 50 mg tablet Take 1 tablet (50 mg total) by mouth daily 90 tablet 3    levothyroxine 50 mcg tablet Take 1 tablet (50 mcg total) by mouth daily 90 tablet 3    loteprednol etabonate (LOTEMAX) 0 5 % ophth gel Administer 1 drop to the right eye Every other day      Elastic Bandages & Supports (MEDICAL COMPRESSION STOCKINGS) MISC Use daily as directed  20-30 compression  6 each 0    pseudoephedrine (SUDAFED) 30 mg tablet Take 60 mg by mouth every 4 (four) hours as needed for congestion      timolol (TIMOPTIC) 0 5 % ophthalmic solution 1 drop In rt eye nightly       No current facility-administered medications for this visit  Allergies: Allergies   Allergen Reactions    Sulfa Antibiotics     Sulfa Antibiotics Rash      Physical Exam:     /70 (BP Location: Left arm, Patient Position: Sitting, Cuff Size: Standard)   Pulse 80   Temp (!) 97 2 °F (36 2 °C) (Temporal)   Ht 5' 6" (1 676 m)   Wt 92 7 kg (204 lb 6 4 oz)   LMP  (LMP Unknown)   SpO2 98%   BMI 32 99 kg/m²     Physical Exam  Constitutional:       General: She is not in acute distress  Appearance: Normal appearance  HENT:      Head: Normocephalic and atraumatic  Mouth/Throat:      Mouth: Mucous membranes are moist    Eyes:      General:         Right eye: No discharge  Left eye: No discharge  Extraocular Movements: Extraocular movements intact  Pupils: Pupils are equal, round, and reactive to light  Comments: Mild ptosis noted of the right eye    Cardiovascular:      Rate and Rhythm: Normal rate  Pulses: Normal pulses  Pulmonary:      Effort: Pulmonary effort is normal  No respiratory distress  Breath sounds: Normal breath sounds  Abdominal:      Palpations: Abdomen is soft  Tenderness: There is abdominal tenderness in the right upper quadrant  There is no guarding or rebound  Musculoskeletal:      Cervical back: Normal range of motion  Comments: +1 pitting edema bilaterally    Neurological:      General: No focal deficit present  Mental Status: She is alert and oriented to person, place, and time     Psychiatric:         Mood and Affect: Mood normal          Behavior: Behavior normal           Jung Berman MD  [address]

## 2021-09-01 NOTE — ASSESSMENT & PLAN NOTE
- Will screen for Hepatitis C per USPSTF recommendations to test adults aged 25 to 78 years  - Will screen for HIV per USPSTF recommendations to test individuals aged 15-65 years   - Most recent mammogram was January 2021 and last pap smear was in 2016  She does have an upcoming appointment with OB/GYN    - Shingrix vaccination administered at today's office; patient to return in 6 months for 2 dose  - Order for cologuard has already been placed however patient is yet to do this; advised to have this done at earliest convenience

## 2021-09-01 NOTE — ASSESSMENT & PLAN NOTE
- Counseled patient on the importance of working to achieve weight reduction goal   - Discussed role that balanced diet and daily activity play in weight reduction    - Exercise should be 30 minutes 5 times weekly of moderate intensity activity- brisk walking acceptable  Recommended diet include mediterranean and DASH  Primary focus should be unprocessed foods, fruits, vegetables, plant based fats and protein, legumes, whole grain and nuts   Vegetables and fruit should make up 1/2 each meal  Limit red meats

## 2021-09-01 NOTE — ASSESSMENT & PLAN NOTE
- Given chronicity of symptoms will order right upper quadrant to rule out gallbladder/liver pathology

## 2021-09-01 NOTE — PROGRESS NOTES
BMI Counseling: Body mass index is 32 99 kg/m²  The BMI is above normal  Patient referred to weight management due to patient being obese

## 2021-09-01 NOTE — ASSESSMENT & PLAN NOTE
- Currently blood pressure is controlled at this time on hydrochlorothiazide 50 mg daily Continue to maintain healthy balanced diet with focus on low salt intake  Limit alcohol intake     - BMP and urine microalbumin/creatinine ordered

## 2021-09-01 NOTE — PATIENT INSTRUCTIONS
Nutrition: How to Make Maru Arshad6  A healthy diet has a lot of benefits  It can prevent certain health conditions like heart disease and cancer, and it can lower your cholesterol  It can give you more energy, help you focus, and improve your mood  It can also help you lose weight or stay at a healthy weight  Path to Improved Health  The choices you make about what you eat and drink matter  They should add up to a balanced, nutritious diet  We all have different calorie needs based on our age, sex, and activity level  Health conditions can have a role, too  Fruits and Vegetables  Fruits and vegetables are rich in fiber, vitamins, and minerals  They should be the basis of your diet  Try to get many different colors of fruits and vegetables each day to add flavor and variety  Fruits and vegetables should cover half of your plate at each meal  Try not to add saturated fats and sugar to vegetables and fruits  This means avoiding margarine, butter, mayonnaise, and sour cream  You can use yogurt, healthy oils (such as canola or olive oil), or herbs instead  Potatoes and corn are not considered vegetables  Your body processes them more like grains  FRUITS & VEGETABLES   INSTEAD OF THIS: TRY THIS:   Regular or fried vegetables served with cream, cheese, or butter Raw, steamed, boiled, sautéed, or baked vegetables tossed with olive oil, salt, and pepper, or with onions or spices added (like garlic and cumin)   Fruits served with cream cheese or sugary sauces Fresh fruit with peanut, almond, or cashew butter or plain yogurt   Fried potatoes, including french fries, hash browns, and potato chips Baked sweet potatoes or other vegetables     Grains  Choose products that list whole grains as the first ingredient  Whole grains are high in fiber, protein, and vitamins  They are digested slowly, which helps you feel full longer and keeps you from overeating  Avoid products that say enriched    Hot cereals like oatmeal are usually low in saturated fat  However, instant cereals with cream may contain processed oils and can be high in sugar  Granola cereals usually contain a lot of sugar  Cold cereals are generally made with refined grains and are high in sugars  Look for whole-grain, low-sugar options instead  Try not to eat rich sweets, such as doughnuts, rolls, and muffins  Consider fruit or a piece of dark chocolate instead to satisfy your sweet tooth  GRAINS   INSTEAD OF THIS: TRY THIS:   Croissants, rolls, biscuits, and white breads Whole-grain breads, including wheat, rye, and pumpernickel   Doughnuts, pastries, and scones Whole-grain English muffins and small whole-grain bagels   Fried tortillas Soft tortillas (corn or whole wheat) without trans fats   Sugary cereals and regular granola Whole-grain cereal, oatmeal, and reduced-sugar granola   Snack crackers Whole-grain crackers   Potato or corn chips and buttered popcorn Unbuttered popcorn   White pasta Whole-wheat pasta   White rice Brown or wild rice   Fried rice or pasta mixes Brown rice or whole-grain pasta with low-sodium vegetable sauce   All-purpose white flour Whole-wheat flour     Protein  Protein can come from animal and vegetable sources  People who get more of their protein from animal sources tend to have more health problems that can lead to illness and early death  It is healthier to eat meat less often and get most of your protein from plant sources  When you eat meat, choose leaner cuts  Vegetable Protein Sources  There are many ways to get protein in your diet even if you do not eat meat  Most vegetables have some protein  When you eat these vegetables with whole grains, seeds, nuts, and especially beans, you can get a good amount of protein  You can swap beans for meat in recipes like lasagna or chili  Soy foods such as tofu, tempeh, and edamame are also good sources of protein      Beef, Pork, Veal, and [employer] and veal cuts have the words loin or round in their names  Lean pork cuts have the words loin or leg in their names  Trim off the outside fat before cooking the meat  Trim any inside fat before eating it  Use herbs, spices, and low-sodium marinades to season meat  Baking, broiling, grilling, and roasting are the healthiest ways to cook meats  Lean cuts can be panbroiled or stir-fried  Use a nonstick pan, canola oil, or olive oil instead of butter or margarine  Don't serve meat with high-fat sauces and gravies  Poultry  Chicken breasts are a good choice because they are low in fat and high in protein  Only eat duck and goose once in a while, because they are higher in saturated fat  Remove skin and visible fat before cooking  Baking, broiling, grilling, and roasting are the healthiest ways to [employer]  Skinless poultry can be pan broiled or stir fried  Use a nonstick pan, canola oil, or olive oil instead of butter or margarine  Seafood  Most seafood is high in healthy polyunsaturated fats  Healthy omega-3 fatty acids also are found in some fish, such as salmon and cold-water trout  If good-quality fresh fish isn't available, buy frozen fish  To prepare fish, you should poach, steam, bake, broil, or grill it      PROTEIN   INSTEAD OF THIS: TRY THIS:   Prime and marbled cuts of meat Select-grade lean beef, such as round, sirloin, and loin cuts   Pork spare ribs and bruce Lean pork, such as tenderloin and loin chop, turkey bruce, tofu bruce   Regular ground beef Lean or extra-lean ground beef, ground chicken or turkey, tempeh, or beans   Lunch meats, such as pepperoni, salami, bologna, and liverwurst Lean lunch meats, such as turkey, chicken, and ham   Regular hot dogs and sausage Fat-free hot dogs, turkey dogs, tofu hot dogs   Breaded fish sticks and cakes, fish canned in oil, or seafood prepared with butter or served with high-fat sauce Fish (fresh, frozen, or canned in water), grilled fish sticks and cakes, or shellfish     Dairy  Choose low-fat, skim, or nondairy milk, such as soy, rice, or almond milk  Try low-fat or part-skim cheeses and other dairy products, or choose smaller portions of foods high in saturated fat  Yogurt can replace sour cream in many recipes  It is important to pick yogurt without added sugar  Try mixing yogurt with fruit for dessert  Sorbet and frozen yogurt are lower in fat than ice cream     DAIRY   INSTEAD OF THIS: TRY THIS:   Whole milk Skim (nonfat), 1% or 2% (low fat), or nondairy milk, such as soy, rice, almond, or cashew milk   Cream or evaporated milk Evaporated skim milk   Regular buttermilk Low-fat buttermilk   Yogurt made with whole milk Low-fat or nonfat yogurt   Regular cheese, including American, blue, Brie, cheddar, Dejuan, and Parmesan Low-fat cheese with less than 3 g fat per serving, or nondairy soy cheese   Regular cottage cheese Low-fat cottage cheese (less than 2% fat)   Regular cream cheese Low-fat cream cheese with less than 3 g fat per 1 oz, or skim ricotta   Ice cream Sorbet, sherbet, or frozen yogurt with less than 3 g fat per ½-cup serving     Fats and Oils  Although high-fat foods are higher in calories, they can help you feel satisfied with eating less  Don't be afraid to have fats in your diet, but try to limit saturated and trans fats  You need saturated and unsaturated fats in your diet, but most Americans get too much saturated fat  Heart disease, diabetes, some cancers, and arthritis have been linked to diets high in saturated fat, particularly saturated fats from animal products  FATS & OILS   INSTEAD OF THIS: TRY THIS:   Cookies Fruit or whole-grain cookies   Shortening, butter, and margarine Olive, canola, and soybean oils   Regular mayonnaise Yogurt   Regular salad dressing Vinaigrette with olive oil and vinegar   Butter or fat to grease pans Nonstick cooking spray, olive oil, or canola oil           Beverages  It is important that you stay hydrated  However, drinks that contain sugar are not healthy  This includes fruit juices, soda, sports and energy drinks, sweetened or flavored milk, and sweet tea  Artificial sweeteners may also be bad for your health  Drink mostly water or other unsweetened drinks  Don't drink too much alcohol  Women should have no more than one drink per day  Men should have no more than two drinks per day  Exercises    Set an Exercise Goal & Make a Plan  If youre ready to start getting active, its time to set a goal and make a plan  Staying Motivated  Its easy to start an exercise routine once youve decided its time for a change, but keeping it up is a challenge for many people  Positive Self-Talk Makes a Difference  The conversations you have with yourself about physical activity and your fitness abilities can have an impact on your performance  Overcoming Barriers to Activity Think about what is keeping you from being active and then check out some of our solutions to the most common barriers to physical activity  · Most adults with with type 1 and type 2 diabetes should engage in 150 min or more of moderate-to-vigorous intensity physical activity per week, spread over at least 3 days/week, with no more than 2 consecutive days without activity  [place] durations (minimum 75 min/week) of vigorous-intensity or interval training may be sufficient for younger and more physically fit individuals  · Adults with type 1  and type 2  diabetes should engage in 2-3 sessions/week of resistance exercise on nonconsecutive days  · All adults, and particularly those with type 2 diabetes, should decrease the amount of time spent in daily sedentary behavior  Prolonged sitting should be interrupted every 30 min for blood glucose benefits, particularly in adults with type 2 diabetes  · Flexibility training and balance training are recommended 2-3 times/week for older adults with diabetes   Yoga and nayla chi may be included based on individual preferences to increase flexibility, muscular strength, and balance  Patient Education     [employer] Healthy Eating Plate [facility]  Department of [email or web address], Choose My Plate [employer] com br  Http://care  diabetesjournals  org/content/40/Supplement_1/S33    [employer]  org: Exercise & Fitness   http://familySmarterphonector  org/familydoctor/en/prevention-wellness/exercise-fitness html     American Diabetes Association (ADA): Weight Loss   https://www [employer]/  org/food-and-fitness/fitness/weight-loss/? utm_source=WWW&utm_medium=DropDownFF&utm_content=WeightLoss&utm_camp aign=CON     My Fitness Pal   http://[employer]   Online nutrition and calorie tracker  National Diabetes Education Program (NDEP): Tasty Recipes for People with Diabetes and Their [place] Latter day (English and Persian)   https://www abebe net/     Radha [employer] Recipes is a bilingual booklet filled with recipes specifically designed for Latin Americans  Recipes are accompanied by their nutritional facts table  The booklet also includes diabetes health information and resources  [facility]  org: Body Mass Index Calculator   http://familySmarterphonector  org/familydoctor/en/health-tools/bmi-calculator html     [employer]  org: Food and Nutrition Topics   http://familySmarterphonector  org/familydoctor/en/prevention-wellness/food-nutrition  html     ADA: Preventing Diabetes with Good Nutrition   https://www [employer]/  org/advocate/our-priorities/prevention/preventing-diabetes-nutrition  html     [employer] Power for Minorities: 10 Tips about Diabetes Prevention and Control   SupertecbookDrDoctor pt  com/HealthChannelDetails  aspx?vw=023

## 2021-09-02 LAB — HIV 1+2 AB+HIV1 P24 AG SERPL QL IA: NORMAL

## 2021-09-03 DIAGNOSIS — E03.9 HYPOTHYROIDISM, UNSPECIFIED TYPE: Primary | ICD-10-CM

## 2021-09-28 ENCOUNTER — OFFICE VISIT (OUTPATIENT)
Dept: SLEEP CENTER | Facility: CLINIC | Age: 51
End: 2021-09-28
Payer: COMMERCIAL

## 2021-09-28 VITALS
DIASTOLIC BLOOD PRESSURE: 64 MMHG | SYSTOLIC BLOOD PRESSURE: 108 MMHG | BODY MASS INDEX: 32.4 KG/M2 | HEIGHT: 66 IN | WEIGHT: 201.6 LBS

## 2021-09-28 DIAGNOSIS — E66.9 OBESITY (BMI 30-39.9): ICD-10-CM

## 2021-09-28 DIAGNOSIS — I10 ESSENTIAL HYPERTENSION: ICD-10-CM

## 2021-09-28 DIAGNOSIS — G47.33 OBSTRUCTIVE SLEEP APNEA SYNDROME: Primary | ICD-10-CM

## 2021-09-28 DIAGNOSIS — F45.8 AEROPHAGIA: ICD-10-CM

## 2021-09-28 DIAGNOSIS — F41.9 ANXIETY: ICD-10-CM

## 2021-09-28 DIAGNOSIS — G47.50 PARASOMNIA, UNSPECIFIED TYPE: ICD-10-CM

## 2021-09-28 DIAGNOSIS — G25.81 RESTLESS LEG SYNDROME: ICD-10-CM

## 2021-09-28 DIAGNOSIS — Z99.89 DEPENDENCE ON CPAP VENTILATION: ICD-10-CM

## 2021-09-28 PROCEDURE — 99214 OFFICE O/P EST MOD 30 MIN: CPT | Performed by: INTERNAL MEDICINE

## 2021-09-28 NOTE — PROGRESS NOTES
Follow-Up Note - Sleep Center   Dirkreyes Moore  46 y o  female  Fern Hernandez  [facility]:[id number]  DOS:9/28/2021    CC: I saw this patient for follow-up in clinic today for Sleep disordered breathing, Coexisting Sleep and Medical Problems  She was using a dream station 1 machine until her few weeks ago when she had Cardiolite replacements machine from [employer] a few weeks ago  Results of prior studies:  A diagnostic study in 2016 demonstrated AHI of 11 7 per hour, higher during REM at 20 7 per hour and considerably higher while supine at 54 per hour  Minimum oxygen saturation was 85 percent  During a subsequent therapeutic study, sleep disordered breathing was adequately remediated with nasal CPAP at 11 centimeter H2O  There were mild periodic limb movements of sleep  PFSH, Problem List, Medications & Allergies were reviewed in EMR  Interval changes: none reported  She  has a past medical history of Axenfeld-Dora syndrome, Disease of thyroid gland, Herniated nucleus pulposus, L5-S1, Hypertension, and Sleep apnea  She has a current medication list which includes the following prescription(s): acetaminophen, atorvastatin, carboxymethylcellulose, medical compression stockings, fluoxetine, hydrochlorothiazide, levothyroxine, loteprednol etabonate, pseudoephedrine, and timolol  PHYSIOLOGICAL DATA REVIEW AND INTERPRETATION:   using PAP > 4 hours/night 100%  Estimated JAMEL 3 9/hour with pressure of 14cm H2O @90th percentile; Compliance: excellent; Sleep disordered breathing:stable & within target range; Patient has been using ozone based or other devices to sanitize the machine  Mood is stable on current medication  SUBJECTIVE: Regarding use of PAP, Micah reports:   · She is experiencing some adverse effects: abdominal bloating or discomfort; has not noticed any fibres or foreign material in air line       · She is benefiting from use: sleeping better and unable to sleep without PAP   · Her restless leg symptoms are not disturbing sleep apnea  · She continues to sleep talk but no recent episodes of sleep walking or acting out dreams content  Sleep Routine: Micah reports getting 8 hrs sleep  ; she has no difficulty initiating or maintaining sleep   She arises spontaneously and feels refreshed  Micah denies Excessive Daytime Sleepiness, or neck pain  She rated herself at Total score: 6 /24 on the [place] Sleepiness Scale  Habits: reports that she has never smoked  She has never used smokeless tobacco ,  reports current alcohol use ,  reports no history of drug use , Caffeine use: limited , Exercise routine: sometimes   ROS: reviewed & as attached  Significant for recent weight gain  She reported no nasal, respiratory or cardiac symptoms  EXAM: /64   Ht 5' 6" (1 676 m)   Wt 91 4 kg (201 lb 9 6 oz)   LMP  (LMP Unknown)   BMI 32 54 kg/m²     Patient is well groomed; well appearing  H&N: EOMI; NC/AT:no facial pressure marks, no rashes  Skin/Extrem: col & hydration normal; no edema  Psych: cooperativeand in no distress  Mental state:appears normal   Resp: Respiratory effort is normal  CNS: Alert, orientated, clear & coherent speech  Physical findings otherwise essentially unchanged from previous  IMPRESSION: Problem List Items & Comorbidities Addressed this Visit    1  Obstructive sleep apnea syndrome  Sleep F/U  - established patient    PAP DME Resupply/Reorder   2  Dependence on CPAP ventilation     3  Restless leg syndrome     4  Anxiety     5  Obesity (BMI 30-39 9)     6  Parasomnia, unspecified type     7  Aerophagia     8  Essential hypertension         PLAN:  1  I reviewed results of prior studies and physiologic data with the patient  I discussed treatment options with risks and benefits  2  Treatment with  PAP is medically necessary and Rosales Eid is agreable to continue use     3  Care of equipment, methods to improve comfort using PAP and importance of compliance with therapy were discussed  I instructed to discontinue using so clean, other holes on based or devices not authorized by   4  Pressure setting: continue 11-14 cmH2O     5  Rx provided to replace  supplies and Care coordinated with DME provider  6  I advised avoiding eating or drinking close to bedtime that she notes aggravates aerophagia  7  Discussed strategies for weight reduction and continued safety precaution with respect to parasomnia activity  8  Follow-up is advised in 1 year or sooner if needed to monitor progress, compliance and to adjust therapy  Thank you for allowing me to participate in the care of this patient  Sincerely,    Authenticated electronically by Leslie Vidal MD on 82/81/37   Board Certified Specialist     Portions of the record may have been created with voice recognition software  Occasional wrong word or "sound a like" substitutions may have occurred due to the inherent limitations of voice recognition software  There may also be notations and random deletions of words or characters from malfunctioning software  Read the chart carefully and recognize, using context, where substitutions/deletions have occurred

## 2021-09-28 NOTE — PATIENT INSTRUCTIONS

## 2021-09-29 ENCOUNTER — CONSULT (OUTPATIENT)
Dept: BARIATRICS | Facility: CLINIC | Age: 51
End: 2021-09-29
Payer: COMMERCIAL

## 2021-09-29 ENCOUNTER — TELEPHONE (OUTPATIENT)
Dept: SLEEP CENTER | Facility: CLINIC | Age: 51
End: 2021-09-29

## 2021-09-29 VITALS
BODY MASS INDEX: 32.5 KG/M2 | SYSTOLIC BLOOD PRESSURE: 128 MMHG | HEIGHT: 66 IN | DIASTOLIC BLOOD PRESSURE: 82 MMHG | WEIGHT: 202.2 LBS | TEMPERATURE: 98.5 F | HEART RATE: 88 BPM

## 2021-09-29 DIAGNOSIS — E03.9 HYPOTHYROIDISM: ICD-10-CM

## 2021-09-29 DIAGNOSIS — E78.5 DYSLIPIDEMIA: Primary | ICD-10-CM

## 2021-09-29 DIAGNOSIS — F41.9 ANXIETY: ICD-10-CM

## 2021-09-29 DIAGNOSIS — G47.33 OBSTRUCTIVE SLEEP APNEA: ICD-10-CM

## 2021-09-29 DIAGNOSIS — E66.9 CLASS 1 OBESITY: ICD-10-CM

## 2021-09-29 DIAGNOSIS — I10 ESSENTIAL HYPERTENSION: ICD-10-CM

## 2021-09-29 PROCEDURE — 99214 OFFICE O/P EST MOD 30 MIN: CPT | Performed by: PHYSICIAN ASSISTANT

## 2021-09-29 NOTE — ASSESSMENT & PLAN NOTE
-Discussed options of HealthyCORE-Intensive Lifestyle Intervention Program, Very Low Calorie Diet-VLCD and Conservative Program and the role of weight loss medications   -Initial weight loss goal of 5-10% weight loss for improved health  -Screening labs   Recommend checking lab coverage before having labs drawn   -tsh, lipid, a1c, bmp reviewed from 9/1/21 all within acceptable limits except for abnormal tsh and elevated lipid   -Patient is interested in pursuing HealthyCORE-Intensive Lifestyle Intervention Program

## 2021-09-29 NOTE — PROGRESS NOTES
Assessment/Plan:    Class 1 obesity  -Discussed options of HealthyCORE-Intensive Lifestyle Intervention Program, Very Low Calorie Diet-VLCD and Conservative Program and the role of weight loss medications   -Initial weight loss goal of 5-10% weight loss for improved health  -Screening labs  Recommend checking lab coverage before having labs drawn   -tsh, lipid, a1c, bmp reviewed from 9/1/21 all within acceptable limits except for abnormal tsh and elevated lipid   -Patient is interested in pursuing HealthyCORE-Intensive Lifestyle Intervention Program        Dyslipidemia  Taking lipitor  -should improve with weight loss, dietary, and lifestyle changes  -continue management with prescribing provider      Anxiety  Taking prozac  -continue management with prescribing provider      Essential hypertension  Taking hctz  -should improve with weight loss, dietary, and lifestyle changes  -continue management with prescribing provider      Obstructive sleep apnea  -encouraged continued use of CPAP machine  -may improve with 20-30% weight loss      Hypothyroidism  Taking levothyroxine  -continue management with prescribing provider          Follow up in approximately 2 weeks with Non-Surgical Dietician  Diagnoses and all orders for this visit:    Dyslipidemia    Class 1 obesity  -     Ambulatory referral to Weight Management    Anxiety    Essential hypertension    Obstructive sleep apnea    Hypothyroidism          Subjective:   Chief Complaint   Patient presents with    Consult     mwm consult        Patient ID: Shelly Lang  is a 46 y o  female with excess weight/obesity here to pursue weight managment  Patient was last here in 2019  At that time she completed [employer] [facility] [employer]  HPI-nurse in utilization review-sitting   Has struggled since 2017  Since 2019 after completing healthycore [facility] ways she has not been doing much for weight loss  Has tried to walk and eat breakfast in the morning   24 oz of water, OK to refill GABAPENTIN 400 mg sig one tablet by mouth three times daily, #90 with 5 refills.  She will need a f/u visit in the next few months.   2 cups of coffee with 2% milk and sugar  No ETOH  Colonoscopy-Not Completed -did cologuard     The following portions of the patient's history were reviewed and updated as appropriate: allergies, current medications, past family history, past medical history, past social history, past surgical history and problem list     Review of Systems   HENT: Negative for sore throat  Respiratory: Negative for cough and shortness of breath  Cardiovascular: Negative for chest pain and palpitations  Gastrointestinal: Negative for abdominal pain, constipation, diarrhea, nausea and vomiting  Denies GERD   Endocrine: Negative for cold intolerance and heat intolerance  Genitourinary: Negative for dysuria  Musculoskeletal: Positive for back pain  Negative for arthralgias  Skin: Negative for rash  Psychiatric/Behavioral: Negative for suicidal ideas (denies HI)  + depression and anxiety-controlled        Objective:    /82 (BP Location: Left arm, Patient Position: Sitting, Cuff Size: Large)   Pulse 88   Temp 98 5 °F (36 9 °C)   Ht 5' 6" (1 676 m)   Wt 91 7 kg (202 lb 3 2 oz)   LMP  (LMP Unknown)   BMI 32 64 kg/m²      Physical Exam  Vitals and nursing note reviewed  Constitutional   General appearance: Abnormal   well developed and obese  Eyes No conjunctival pallor  Pulmonary   Respiratory effort: No increased work of breathing or signs of respiratory distress  Abdomen   Abdomen: Abnormal   The abdomen was obese     Musculoskeletal   Gait and station: Normal     Psychiatric   Orientation to person, place and time: Normal     Affect: appropriate

## 2021-10-06 ENCOUNTER — OFFICE VISIT (OUTPATIENT)
Dept: BARIATRICS | Facility: CLINIC | Age: 51
End: 2021-10-06

## 2021-10-06 VITALS — HEIGHT: 66 IN | BODY MASS INDEX: 31.81 KG/M2 | WEIGHT: 197.9 LBS

## 2021-10-06 DIAGNOSIS — R63.5 ABNORMAL WEIGHT GAIN: ICD-10-CM

## 2021-10-06 PROCEDURE — WMWO WEIGHT MANAGEMENT WEEKLY OPTION: Performed by: DIETITIAN, REGISTERED

## 2021-10-06 PROCEDURE — RECHECK: Performed by: DIETITIAN, REGISTERED

## 2021-10-21 ENCOUNTER — CLINICAL SUPPORT (OUTPATIENT)
Dept: BARIATRICS | Facility: CLINIC | Age: 51
End: 2021-10-21

## 2021-10-21 VITALS — BODY MASS INDEX: 31.82 KG/M2 | HEIGHT: 66 IN | WEIGHT: 198 LBS

## 2021-10-21 DIAGNOSIS — R63.5 ABNORMAL WEIGHT GAIN: Primary | ICD-10-CM

## 2021-10-21 PROCEDURE — RECHECK

## 2021-10-27 ENCOUNTER — IMMUNIZATIONS (OUTPATIENT)
Dept: FAMILY MEDICINE CLINIC | Facility: MEDICAL CENTER | Age: 51
End: 2021-10-27

## 2021-10-27 DIAGNOSIS — Z23 ENCOUNTER FOR IMMUNIZATION: Primary | ICD-10-CM

## 2021-10-27 PROCEDURE — 91300 SARSCOV2 VAC 30MCG/0.3ML IM: CPT

## 2021-11-04 ENCOUNTER — CLINICAL SUPPORT (OUTPATIENT)
Dept: BARIATRICS | Facility: CLINIC | Age: 51
End: 2021-11-04

## 2021-11-04 VITALS — HEIGHT: 66 IN | BODY MASS INDEX: 31.6 KG/M2 | WEIGHT: 196.6 LBS

## 2021-11-04 DIAGNOSIS — R63.5 ABNORMAL WEIGHT GAIN: Primary | ICD-10-CM

## 2021-11-04 PROCEDURE — RECHECK

## 2021-11-11 ENCOUNTER — CLINICAL SUPPORT (OUTPATIENT)
Dept: BARIATRICS | Facility: CLINIC | Age: 51
End: 2021-11-11

## 2021-11-11 VITALS — BODY MASS INDEX: 31.82 KG/M2 | HEIGHT: 66 IN | WEIGHT: 198 LBS

## 2021-11-11 DIAGNOSIS — R63.5 ABNORMAL WEIGHT GAIN: Primary | ICD-10-CM

## 2021-11-11 PROCEDURE — RECHECK

## 2021-11-12 ENCOUNTER — IMMUNIZATIONS (OUTPATIENT)
Dept: FAMILY MEDICINE CLINIC | Facility: CLINIC | Age: 51
End: 2021-11-12
Payer: COMMERCIAL

## 2021-11-12 DIAGNOSIS — Z23 NEED FOR INFLUENZA VACCINATION: Primary | ICD-10-CM

## 2021-11-12 PROCEDURE — 90682 RIV4 VACC RECOMBINANT DNA IM: CPT

## 2021-11-12 PROCEDURE — 90471 IMMUNIZATION ADMIN: CPT

## 2021-11-18 ENCOUNTER — CLINICAL SUPPORT (OUTPATIENT)
Dept: BARIATRICS | Facility: CLINIC | Age: 51
End: 2021-11-18

## 2021-11-18 VITALS — BODY MASS INDEX: 31.27 KG/M2 | HEIGHT: 66 IN | WEIGHT: 194.6 LBS

## 2021-11-18 DIAGNOSIS — R63.5 ABNORMAL WEIGHT GAIN: Primary | ICD-10-CM

## 2021-11-18 PROCEDURE — RECHECK

## 2021-11-23 ENCOUNTER — CLINICAL SUPPORT (OUTPATIENT)
Dept: BARIATRICS | Facility: CLINIC | Age: 51
End: 2021-11-23

## 2021-11-23 VITALS — BODY MASS INDEX: 31.47 KG/M2 | HEIGHT: 66 IN | WEIGHT: 195.8 LBS

## 2021-11-23 DIAGNOSIS — R63.5 ABNORMAL WEIGHT GAIN: Primary | ICD-10-CM

## 2021-11-23 PROCEDURE — RECHECK

## 2021-12-01 ENCOUNTER — OFFICE VISIT (OUTPATIENT)
Dept: BARIATRICS | Facility: CLINIC | Age: 51
End: 2021-12-01

## 2021-12-01 VITALS — WEIGHT: 192.9 LBS | BODY MASS INDEX: 31 KG/M2 | HEIGHT: 66 IN

## 2021-12-01 DIAGNOSIS — R63.5 ABNORMAL WEIGHT GAIN: Primary | ICD-10-CM

## 2021-12-01 PROCEDURE — RECHECK: Performed by: DIETITIAN, REGISTERED

## 2021-12-16 ENCOUNTER — CLINICAL SUPPORT (OUTPATIENT)
Dept: BARIATRICS | Facility: CLINIC | Age: 51
End: 2021-12-16

## 2021-12-16 VITALS — WEIGHT: 194 LBS | HEIGHT: 66 IN | BODY MASS INDEX: 31.18 KG/M2

## 2021-12-16 DIAGNOSIS — R63.5 ABNORMAL WEIGHT GAIN: Primary | ICD-10-CM

## 2021-12-16 PROCEDURE — RECHECK

## 2022-01-06 ENCOUNTER — CLINICAL SUPPORT (OUTPATIENT)
Dept: BARIATRICS | Facility: CLINIC | Age: 52
End: 2022-01-06

## 2022-01-06 VITALS — HEIGHT: 66 IN | BODY MASS INDEX: 31.12 KG/M2 | WEIGHT: 193.6 LBS

## 2022-01-06 DIAGNOSIS — R63.5 ABNORMAL WEIGHT GAIN: Primary | ICD-10-CM

## 2022-01-06 PROCEDURE — RECHECK

## 2022-01-19 ENCOUNTER — OFFICE VISIT (OUTPATIENT)
Dept: BARIATRICS | Facility: CLINIC | Age: 52
End: 2022-01-19

## 2022-01-19 VITALS — WEIGHT: 195.2 LBS | HEIGHT: 66 IN | BODY MASS INDEX: 31.37 KG/M2

## 2022-01-19 DIAGNOSIS — R63.5 ABNORMAL WEIGHT GAIN: Primary | ICD-10-CM

## 2022-01-19 PROCEDURE — RECHECK: Performed by: DIETITIAN, REGISTERED

## 2022-01-19 NOTE — PROGRESS NOTES
Weight Management Medical Nutrition Assessment  Cesar Maria for a follow-up session with [email or web address]  Today's weight FU 401  2#  Yeni Kang has lost 2 7# in the past 3 months  She stated she has struggled with snacking between meals while working from home  She craves salty crunchy snacks at that time  Reviewed alternate snacks and recommend pre plan and prelog between meals snacks  She has increased her physical activity by going to the [employer]  Reviewed her low calorie meal plan  Recommend: Pre- log / pre plan her in between meal snacks      Patient seen by Medical Provider in past 6 months:  yes  Requested to schedule appointment with Medical Provider: No        Anthropometric Measurements  Start Weight (#): 197 9# (10/6/21)  Current Weight (#): 195 2#  TBW % Change from start weight:1 4%  Ideal Body Weight (#):130#   Goal Weight (#):ST#       Weight Loss History  Previous weight loss attempts: Commercial Programs (Weight Watchers, [employer], etc )  Counseling with  MD  Self Created Diets (Portion Control, Healthy Food Choices, etc )     Food and Nutrition Related History     Working from [employer] or [employer]   Coffee 2x with 1 tbsp 2 % milk      Snack:PB pretzels - unmeasured  Lunch:3 slices honey smoked turkey / carrots / 2 Tbsp Hummas  Snack:Olive oil chips - unmeasured  Dinner:chicken & fish/ veggies/ smaller risotto  - measured   Snack:salty carb - unmeasured        Beverages: water  Volume of beverage intake: 40-60oz     Weekends: Same  Cravings: salty carbs  Trouble area of day:between meal snacking     Frequency of Eating out: irregularly  Food restrictions:none reported  Cooking: self   Food Shopping: self     Physical Activity Intake  Activity:[employer] program - cardio 10-15min / strength training  Frequency: 3x gym   Physical limitations/barriers to exercise: none reported     Estimated Needs  Energy  [employer] Needs:  BMR : 7579 calories   1-2# loss weekly sedentary:  191-4962 calories             1-2# loss weekly lightly active:8452-3312 calories  Maintenance calories for sedentary activity level: 1808 calories  Protein:70-88gm      (1 2-1 5g/kg IBW)  Fluid: 69oz     (35mL/kg IBW)     Nutrition Diagnosis  Yes;    Overweight/obesity  related to Excess energy intake as evidenced by  BMI more than normative standard for age and sex (obesity-grade I 30-34  9)     Nutrition Intervention     Nutrition Prescription  Calories:1200 calories and flex to 1400 calories on cardio days  Protein:70-88gm  Fluid:70oz     Meal Plan (Fernandez/Pro/Carb)  Breakfast: 200-300/20/30  Snack:  Lunch: 300/30/30-45  Snack: 150/>5/20  Dinner: 300/30/30-45  Snack: 150/>5/20     Nutrition Education:    Healthy Core Manual  Calorie controlled menu  Lean protein food choices  Healthy snack options  Food journaling tips        Nutrition Counseling:  Strategies: meal planning, portion sizes, healthy snack choices, hydration, fiber intake, protein intake, exercise, food journal        Monitoring and Evaluation:  Evaluation criteria:  Energy Intake  Meet protein needs  Maintain adequate hydration  Monitor weekly weight  Meal planning/preparation  Food journal   Decreased portions at mealtimes and snacks  Physical activity      Barriers to learning:none  Readiness to change: Action:  (Changing behavior)  Comprehension: good  Expected Compliance: good

## 2022-02-10 ENCOUNTER — CLINICAL SUPPORT (OUTPATIENT)
Dept: BARIATRICS | Facility: CLINIC | Age: 52
End: 2022-02-10

## 2022-02-10 VITALS — WEIGHT: 194.4 LBS | BODY MASS INDEX: 31.24 KG/M2 | HEIGHT: 66 IN

## 2022-02-10 DIAGNOSIS — R63.5 ABNORMAL WEIGHT GAIN: Primary | ICD-10-CM

## 2022-02-10 PROCEDURE — RECHECK

## 2022-02-17 ENCOUNTER — CLINICAL SUPPORT (OUTPATIENT)
Dept: BARIATRICS | Facility: CLINIC | Age: 52
End: 2022-02-17

## 2022-02-17 VITALS — WEIGHT: 193 LBS | HEIGHT: 66 IN | BODY MASS INDEX: 31.02 KG/M2

## 2022-02-17 DIAGNOSIS — R63.5 ABNORMAL WEIGHT GAIN: Primary | ICD-10-CM

## 2022-02-17 PROCEDURE — RECHECK

## 2022-02-24 ENCOUNTER — CLINICAL SUPPORT (OUTPATIENT)
Dept: BARIATRICS | Facility: CLINIC | Age: 52
End: 2022-02-24

## 2022-02-24 VITALS — BODY MASS INDEX: 31.05 KG/M2 | WEIGHT: 193.2 LBS | HEIGHT: 66 IN

## 2022-02-24 DIAGNOSIS — R63.5 ABNORMAL WEIGHT GAIN: Primary | ICD-10-CM

## 2022-02-24 PROCEDURE — RECHECK

## 2022-03-02 ENCOUNTER — CLINICAL SUPPORT (OUTPATIENT)
Dept: FAMILY MEDICINE CLINIC | Facility: CLINIC | Age: 52
End: 2022-03-02
Payer: COMMERCIAL

## 2022-03-02 DIAGNOSIS — Z23 NEED FOR SHINGLES VACCINE: Primary | ICD-10-CM

## 2022-03-02 PROCEDURE — 90471 IMMUNIZATION ADMIN: CPT

## 2022-03-02 PROCEDURE — 90750 HZV VACC RECOMBINANT IM: CPT

## 2022-03-10 ENCOUNTER — CLINICAL SUPPORT (OUTPATIENT)
Dept: BARIATRICS | Facility: CLINIC | Age: 52
End: 2022-03-10

## 2022-03-10 VITALS — BODY MASS INDEX: 32.81 KG/M2 | WEIGHT: 192.2 LBS | HEIGHT: 64 IN

## 2022-03-10 DIAGNOSIS — R63.5 ABNORMAL WEIGHT GAIN: Primary | ICD-10-CM

## 2022-03-10 PROCEDURE — RECHECK

## 2022-03-18 NOTE — PROGRESS NOTES
Weight Management Medical Nutrition Assessment  Jamila Wilson for a follow-up session with [facility]  Today's weight is 192 4#   She has lost 2 8# in the past 2months and overall has lost 6 5#  She stated she has struggled with snacking between meals while working from home  She craves salty crunchy snacks at that time   Reviewed alternate snacks and recommend pre plan and prelog between meals snacks   She has not been going to the [address] her low calorie meal plan  Recommend: Pre- log / pre [place] in between meal snacks and increase physical activity     Patient seen by Medical Provider in past 6 months:  yes  Requested to schedule appointment with Medical Provider: No        Anthropometric Measurements  Start Weight (#): 198 9# (18)  Current Weight (#): 192 4#  TBW % Change from start weight:3 2%  Ideal Body Weight (#):130#   Goal Weight (#):ST#       Weight Loss History  Previous weight loss attempts: Commercial Programs (Weight Watchers, Kai Braswell, etc )  Counseling with  MD  Self Created Diets (Portion Control, Healthy Food Choices, etc )     Food and Nutrition Related History     Working from [address]: [employer]   Coffee 2x with 1 tbsp 2 % milk      Snack: PB pretzels - unmeasured  Lunch:3 slices honey smoked turkey / carrots / 2 Tbsp Hummas / sandwich   Snack:Olive oil chips - unmeasured  Dinner:chicken & fish/ veggies/ smaller risotto  - measured   Snack:salty carb - unmeasured        Beverages: water  Volume of beverage intake:40-60oz     Weekends: Same  Cravings: salty carbs  Trouble area of day:between meal snacking     Frequency of Eating out: irregularly  Food restrictions:none reported  Cooking: self   Food Shopping: self     Physical Activity Intake  Activity:[employer] program not going  Frequency: 3x gym   Physical limitations/barriers to exercise: none reported     Estimated Needs  Energy  [employer] Needs: BMR : 1507 calories   1-2# loss weekly sedentary:  808-1308 calories             1-2# loss weekly lightly active:3500-4412 calories  Maintenance calories for sedentary activity level: 1808 calories  Protein:70-88gm      (1 2-1 5g/kg IBW)  Fluid: 69oz     (35mL/kg IBW)     Nutrition Diagnosis  Yes;    Overweight/obesity  related to Excess energy intake as evidenced by  BMI more than normative standard for age and sex (obesity-grade I 30-34  9)     Nutrition Intervention     Nutrition Prescription  Calories:1200 calories and flex to 1400 calories on cardio days  Protein:70-88gm  Fluid:70oz     Meal Plan (Fernandez/Pro/Carb)  Breakfast: 200-300/20/30  Snack:  Lunch: 300/30/30-45  Snack: 150/>5/20  Dinner: 300/30/30-45  Snack: 150/>5/20     Nutrition Education:    Healthy Core Manual  Calorie controlled menu  Lean protein food choices  Healthy snack options  Food journaling tips        Nutrition Counseling:  Strategies: meal planning, portion sizes, healthy snack choices, hydration, fiber intake, protein intake, exercise, food journal        Monitoring and Evaluation:  Evaluation criteria:  Energy Intake  Meet protein needs  Maintain adequate hydration  Monitor weekly weight  Meal planning/preparation  Food journal   Decreased portions at mealtimes and snacks  Physical activity      Barriers to learning:none  Readiness to change: Action:  (Changing behavior)  Comprehension: good  Expected Compliance: good

## 2022-03-21 ENCOUNTER — OFFICE VISIT (OUTPATIENT)
Dept: BARIATRICS | Facility: CLINIC | Age: 52
End: 2022-03-21

## 2022-03-21 VITALS — WEIGHT: 192.4 LBS | HEIGHT: 66 IN | BODY MASS INDEX: 30.92 KG/M2

## 2022-03-21 DIAGNOSIS — R63.5 ABNORMAL WEIGHT GAIN: Primary | ICD-10-CM

## 2022-03-21 PROCEDURE — RECHECK: Performed by: DIETITIAN, REGISTERED

## 2022-03-25 ENCOUNTER — LAB REQUISITION (OUTPATIENT)
Dept: LAB | Facility: HOSPITAL | Age: 52
End: 2022-03-25
Payer: COMMERCIAL

## 2022-03-25 DIAGNOSIS — Z12.4 ENCOUNTER FOR SCREENING FOR MALIGNANT NEOPLASM OF CERVIX: ICD-10-CM

## 2022-03-25 PROCEDURE — G0145 SCR C/V CYTO,THINLAYER,RESCR: HCPCS | Performed by: OBSTETRICS & GYNECOLOGY

## 2022-03-25 PROCEDURE — G0476 HPV COMBO ASSAY CA SCREEN: HCPCS | Performed by: OBSTETRICS & GYNECOLOGY

## 2022-03-29 DIAGNOSIS — F41.1 GENERALIZED ANXIETY DISORDER: ICD-10-CM

## 2022-03-29 DIAGNOSIS — I10 ESSENTIAL HYPERTENSION: ICD-10-CM

## 2022-03-29 NOTE — TELEPHONE ENCOUNTER
Failed protocol    Cardiovascular: Diuretics - Thiazide Failed    BP completed in the last 6 months    Na in normal range and within 360 days    Valid encounter within last 6 months    Ca in normal range and within 360 days    Cr in normal range and within 360 days    K in normal range and within 360 days       Psychiatry: Antidepressants -SSRI Failed    Valid encounter within the last 6 months

## 2022-03-30 LAB
HPV HR 12 DNA CVX QL NAA+PROBE: NEGATIVE
HPV16 DNA CVX QL NAA+PROBE: NEGATIVE
HPV18 DNA CVX QL NAA+PROBE: NEGATIVE

## 2022-03-30 RX ORDER — HYDROCHLOROTHIAZIDE 50 MG/1
50 TABLET ORAL DAILY
Qty: 90 TABLET | Refills: 3 | Status: SHIPPED | OUTPATIENT
Start: 2022-03-30

## 2022-03-30 RX ORDER — FLUOXETINE HYDROCHLORIDE 20 MG/1
20 CAPSULE ORAL DAILY
Qty: 90 CAPSULE | Refills: 3 | Status: SHIPPED | OUTPATIENT
Start: 2022-03-30

## 2022-04-04 LAB
LAB AP GYN PRIMARY INTERPRETATION: NORMAL
Lab: NORMAL

## 2022-04-22 ENCOUNTER — HOSPITAL ENCOUNTER (OUTPATIENT)
Dept: MAMMOGRAPHY | Facility: MEDICAL CENTER | Age: 52
Discharge: HOME | End: 2022-04-22
Payer: COMMERCIAL

## 2022-04-22 VITALS — WEIGHT: 195 LBS | HEIGHT: 66 IN | BODY MASS INDEX: 31.34 KG/M2

## 2022-04-22 DIAGNOSIS — Z12.31 ENCOUNTER FOR SCREENING MAMMOGRAM FOR MALIGNANT NEOPLASM OF BREAST: ICD-10-CM

## 2022-04-22 PROCEDURE — 77067 SCR MAMMO BI INCL CAD: CPT

## 2022-04-22 PROCEDURE — 77063 BREAST TOMOSYNTHESIS BI: CPT

## 2022-06-21 ENCOUNTER — IMMUNIZATIONS (OUTPATIENT)
Dept: FAMILY MEDICINE CLINIC | Facility: CLINIC | Age: 52
End: 2022-06-21
Payer: COMMERCIAL

## 2022-06-21 DIAGNOSIS — Z23 ENCOUNTER FOR IMMUNIZATION: Primary | ICD-10-CM

## 2022-06-21 PROCEDURE — 0054A PR IMM ADMN SARSCOV2 30MCG/0.3ML TRIS-SUCROSE BST: CPT

## 2022-06-21 PROCEDURE — 91305 PR SARSCOV2 VACCINE 30MCG/0.3ML TRIS-SUCROSE IM USE: CPT

## 2022-07-20 ENCOUNTER — APPOINTMENT (OUTPATIENT)
Dept: LAB | Facility: MEDICAL CENTER | Age: 52
End: 2022-07-20

## 2022-07-20 DIAGNOSIS — Z00.8 HEALTH EXAMINATION IN POPULATION SURVEY: ICD-10-CM

## 2022-07-20 LAB
CHOLEST SERPL-MCNC: 230 MG/DL
EST. AVERAGE GLUCOSE BLD GHB EST-MCNC: 123 MG/DL
HBA1C MFR BLD: 5.9 %
HDLC SERPL-MCNC: 42 MG/DL
LDLC SERPL CALC-MCNC: 131 MG/DL (ref 0–100)
NONHDLC SERPL-MCNC: 188 MG/DL
TRIGL SERPL-MCNC: 283 MG/DL

## 2022-07-20 PROCEDURE — 83036 HEMOGLOBIN GLYCOSYLATED A1C: CPT

## 2022-07-20 PROCEDURE — 80061 LIPID PANEL: CPT

## 2022-07-20 PROCEDURE — 36415 COLL VENOUS BLD VENIPUNCTURE: CPT

## 2022-09-30 ENCOUNTER — OFFICE VISIT (OUTPATIENT)
Dept: SLEEP CENTER | Facility: CLINIC | Age: 52
End: 2022-09-30
Payer: COMMERCIAL

## 2022-09-30 VITALS
HEART RATE: 72 BPM | BODY MASS INDEX: 32.28 KG/M2 | WEIGHT: 200 LBS | DIASTOLIC BLOOD PRESSURE: 70 MMHG | SYSTOLIC BLOOD PRESSURE: 133 MMHG

## 2022-09-30 DIAGNOSIS — Z99.89 DEPENDENCE ON CPAP VENTILATION: ICD-10-CM

## 2022-09-30 DIAGNOSIS — I10 ESSENTIAL HYPERTENSION: ICD-10-CM

## 2022-09-30 DIAGNOSIS — G47.33 OBSTRUCTIVE SLEEP APNEA SYNDROME: Primary | ICD-10-CM

## 2022-09-30 DIAGNOSIS — E03.9 HYPOTHYROIDISM, UNSPECIFIED TYPE: ICD-10-CM

## 2022-09-30 DIAGNOSIS — F45.8 AEROPHAGIA: ICD-10-CM

## 2022-09-30 DIAGNOSIS — E66.9 OBESITY (BMI 30-39.9): ICD-10-CM

## 2022-09-30 DIAGNOSIS — G25.81 RESTLESS LEG SYNDROME: ICD-10-CM

## 2022-09-30 DIAGNOSIS — F41.9 ANXIETY: ICD-10-CM

## 2022-09-30 DIAGNOSIS — G47.50 PARASOMNIA, UNSPECIFIED TYPE: ICD-10-CM

## 2022-09-30 PROCEDURE — 99214 OFFICE O/P EST MOD 30 MIN: CPT | Performed by: INTERNAL MEDICINE

## 2022-09-30 NOTE — PATIENT INSTRUCTIONS
Nursing Support:  When: Monday through Friday 7A-5PM except holidays  Where: Our direct line is [phone]  If you are having a true emergency please call 911  In the event that the line is busy or it is after hours please leave a voice message and we will return your call  Please speak clearly, leaving your full name, birth date, best number to reach you and the reason for your call  Medication refills: We will need the name of the medication, the dosage, the ordering provider, whether you get a 30 or 90 day refill, and the pharmacy name and address  Medications will be ordered by the provider only  Nurses cannot call in prescriptions  Please allow 7 days for medication refills  Physician requested updates: If your provider requested that you call with an update after starting medication, please be ready to provide us the medication and dosage, what time you take your medication, the time you attempt to fall asleep, time you fall asleep, when you wake up, and what time you get out of bed  Sleep Study Results: We will contact you with sleep study results and/or next steps after the physician has reviewed your testing  Eyes with no visual disturbances.  Ears clean and dry and no hearing difficulties. Nose with pink mucosa and no drainage.  Mouth mucous membranes moist and pink.  No tenderness or swelling to throat or neck.

## 2022-09-30 NOTE — PROGRESS NOTES
Review of Systems      Genitourinary post menopausal (no peroids)   Cardiology ankle/leg swelling   Gastrointestinal none   Neurology need to move extremities   Constitutional none   Integumentary none   Psychiatry none   Musculoskeletal joint pain and back pain   Pulmonary none   ENT none   Endocrine none   Hematological none

## 2022-09-30 NOTE — PROGRESS NOTES
Follow-Up Note - Sleep [facility]   Micah Moore  46 y o  female  Ambrocio Pope  [facility]:[id number]  DOS:9/30/2022    CC: I saw this patient for follow-up in clinic today for Sleep disordered breathing, Coexisting Sleep and Medical Problems  She got a replacement dream Station version 2 machine from [employer] approximately a year ago    [facility], Problem List, Medications & Allergies were reviewed in EMR  Interval changes: none reported  She  has a past medical history of Axenfeld-Dora syndrome, Disease of thyroid gland, Herniated nucleus pulposus, L5-S1, Hypertension, and Sleep apnea  She has a current medication list which includes the following prescription(s): acetaminophen, atorvastatin, carboxymethylcellulose, fluoxetine, hydrochlorothiazide, levothyroxine, and loteprednol etabonate  PHYSIOLOGICAL DATA REVIEW AND INTERPRETATION:    using PAP > 4 hours/night 97%  Estimated JAMEL 4 2/hour with pressure of 14cm H2O @90th/95th percentile; Patient has not been using ozone based devices to sanitize the machine  SUBJECTIVE: Regarding use of PAP, Micah reports:   · no adverse effects:  Aerophagia that occurs when she sleeps on her right side and improved by sleeping on her left side; has not noticed any fibres or foreign material in air line  · She is benefiting from use: sleeping better   · Restless leg symptoms occur infrequently  · She continues to sleep talk but reports no other recent parasomnia activity  Sleep Routine: Micah reports getting 7 5 hrs sleep  ; she has no difficulty initiating or maintaining sleep   She arises before alarm most days and usually feels refreshed  Micah denies Excessive Daytime Sleepiness,   She rated herself at Total score: 7 /24 on the Avery Sleepiness Scale  Habits: reports that she has never smoked  She has never used smokeless tobacco ,  reports current alcohol use ,  reports no history of drug use , Caffeine use:limited ;  Exercise routine: "not enough"   ROS: reviewed & as attached  Significant for few lb weight gain  She reported no nasal, respiratory or cardiac symptoms  Anxiety is controlled on Prozac  EXAM: /70   Pulse 72   Wt 90 7 kg (200 lb)   LMP  (LMP Unknown)   BMI 32 28 kg/m²     Wt Readings from Last 3 Encounters:   09/30/22 90 7 kg (200 lb)   04/22/22 88 5 kg (195 lb)   03/21/22 87 3 kg (192 lb 6 4 oz)      Patient is well groomed; well appearing  CNS: Alert, orientated, clear & coherent speech  Psych: cooperativeand in no distress  Mental state:appears normal   H&N: EOMI; NC/AT:no facial pressure marks, no rashes  Skin/Extrem: col & hydration normal; no edema  Resp: Respiratory effort is normal  Physical findings otherwise essentially unchanged from previous  IMPRESSION: Problem List Items & Comorbidities Addressed this Visit    1  Obstructive sleep apnea syndrome  PAP DME Pressure Change    PAP DME Resupply/Reorder   2  Dependence on CPAP ventilation     3  Restless leg syndrome     4  Anxiety     5  Parasomnia, unspecified type     6  Aerophagia     7  Obesity (BMI 30-39 9)     8  Essential hypertension     Above all stable/controlled    PLAN:  1  I reviewed results of prior studies and physiologic data with the patient  2  I discussed treatment options with risks and benefits  3  Treatment with  PAP is medically necessary and Michael Benson is agreable to continue use  4  Care of equipment, methods to improve comfort using PAP and importance of compliance with therapy were discussed  5  Pressure setting: change 11-15 cmH2O     6  Rx provided to replace  supplies and Care coordinated with DME provider  7  Discussed strategies for weight reduction  8  Follow-up is advised in 1 year or sooner if needed to monitor progress, compliance and to adjust therapy  Thank you for allowing me to participate in the care of this patient      Sincerely,     Authenticated electronically on 13/64/19   Board Certified Specialist     Portions of the record may have been created with voice recognition software  Occasional wrong word or "sound a like" substitutions may have occurred due to the inherent limitations of voice recognition software  There may also be notations and random deletions of words or characters from malfunctioning software  Read the chart carefully and recognize, using context, where substitutions/deletions have occurred

## 2022-10-03 ENCOUNTER — OFFICE VISIT (OUTPATIENT)
Dept: PHYSICAL THERAPY | Facility: CLINIC | Age: 52
End: 2022-10-03
Payer: COMMERCIAL

## 2022-10-03 ENCOUNTER — TELEPHONE (OUTPATIENT)
Dept: SLEEP CENTER | Facility: CLINIC | Age: 52
End: 2022-10-03

## 2022-10-03 DIAGNOSIS — G89.29 CHRONIC LEFT SHOULDER PAIN: Primary | ICD-10-CM

## 2022-10-03 DIAGNOSIS — M25.512 CHRONIC LEFT SHOULDER PAIN: Primary | ICD-10-CM

## 2022-10-03 PROCEDURE — 97162 PT EVAL MOD COMPLEX 30 MIN: CPT | Performed by: PHYSICAL THERAPIST

## 2022-10-03 RX ORDER — LEVOTHYROXINE SODIUM 0.05 MG/1
TABLET ORAL
Qty: 90 TABLET | Refills: 0 | OUTPATIENT
Start: 2022-10-03

## 2022-10-03 NOTE — PROGRESS NOTES
PT Evaluation     Today's date: 10/3/2022  Patient name: Derik Crawford  : 1970  MRN: [id number]  Referring provider: Celina Bonner PT  Dx:   Encounter Diagnosis     ICD-10-CM    1  Chronic left shoulder pain  M25 512     G89 29                   Assessment  Assessment details: Patient is a 46 y o  female who presents to physical therapy with physician diagnosis of Chronic left shoulder pain  (primary encounter diagnosis)  Patient would benefit from skilled physical therapy intervention to address her impairments and to maximize function  Thank you for your referral and please feel free to contact me at [phone]  Understanding of Dx/Px/POC: good   Prognosis: good    Goals  STG 2 weeks   Decrease pain by 50%  Improve ROM by 50%    LTG 4 weeks  No pain with ADL's        Subjective Evaluation    History of Present Illness  Mechanism of injury: Patient has been dealing with left shoulder pain that seems to be increasing and is affecting her ROM  Pain increases with reaching out to her side or above her head  She also has pain with sleeping on her left side  She now has a desk job  Denies trauma, falls or weakness in her LUE  She denies radicular pain but does at times have neck pain  Denies headaches related to her shoulder pain             Recurrent probem    Quality of life: good    Pain  Current pain ratin  At best pain ratin  At worst pain ratin  Quality: dull ache          Objective     General Comments:      Shoulder Comments   [facility] joint inferior capsule hypomobility  1st rib elevated and hypomobile  Upper thoracic hypomobility extension and rotation                Precautions: none       Manuals 10/3                                                                Neuro Re-Ed                                                                                                        Ther Ex Ther Activity                                       Gait Training                                       Modalities

## 2022-10-07 ENCOUNTER — OFFICE VISIT (OUTPATIENT)
Dept: FAMILY MEDICINE CLINIC | Facility: CLINIC | Age: 52
End: 2022-10-07
Payer: COMMERCIAL

## 2022-10-07 ENCOUNTER — OFFICE VISIT (OUTPATIENT)
Dept: PHYSICAL THERAPY | Facility: CLINIC | Age: 52
End: 2022-10-07
Payer: COMMERCIAL

## 2022-10-07 VITALS
TEMPERATURE: 97.8 F | HEIGHT: 66 IN | OXYGEN SATURATION: 98 % | SYSTOLIC BLOOD PRESSURE: 124 MMHG | BODY MASS INDEX: 32.17 KG/M2 | RESPIRATION RATE: 12 BRPM | DIASTOLIC BLOOD PRESSURE: 82 MMHG | HEART RATE: 82 BPM | WEIGHT: 200.2 LBS

## 2022-10-07 DIAGNOSIS — R10.11 RIGHT UPPER QUADRANT PAIN: ICD-10-CM

## 2022-10-07 DIAGNOSIS — Z00.00 WELL ADULT EXAM: Primary | ICD-10-CM

## 2022-10-07 DIAGNOSIS — M25.512 CHRONIC LEFT SHOULDER PAIN: Primary | ICD-10-CM

## 2022-10-07 DIAGNOSIS — Z23 NEED FOR INFLUENZA VACCINATION: ICD-10-CM

## 2022-10-07 DIAGNOSIS — E03.9 HYPOTHYROIDISM, UNSPECIFIED TYPE: ICD-10-CM

## 2022-10-07 DIAGNOSIS — E78.5 DYSLIPIDEMIA: ICD-10-CM

## 2022-10-07 DIAGNOSIS — G89.29 CHRONIC LEFT SHOULDER PAIN: Primary | ICD-10-CM

## 2022-10-07 PROCEDURE — 90682 RIV4 VACC RECOMBINANT DNA IM: CPT

## 2022-10-07 PROCEDURE — 97112 NEUROMUSCULAR REEDUCATION: CPT | Performed by: PHYSICAL THERAPIST

## 2022-10-07 PROCEDURE — 99396 PREV VISIT EST AGE 40-64: CPT | Performed by: FAMILY MEDICINE

## 2022-10-07 PROCEDURE — 97140 MANUAL THERAPY 1/> REGIONS: CPT | Performed by: PHYSICAL THERAPIST

## 2022-10-07 PROCEDURE — 90471 IMMUNIZATION ADMIN: CPT

## 2022-10-07 RX ORDER — LEVOTHYROXINE SODIUM 0.05 MG/1
50 TABLET ORAL DAILY
Qty: 90 TABLET | Refills: 3 | Status: SHIPPED | OUTPATIENT
Start: 2022-10-07

## 2022-10-07 RX ORDER — ATORVASTATIN CALCIUM 10 MG/1
10 TABLET, FILM COATED ORAL DAILY
Qty: 90 TABLET | Refills: 3 | Status: SHIPPED | OUTPATIENT
Start: 2022-10-07

## 2022-10-07 NOTE — PROGRESS NOTES
Daily Note     Today's date: 10/7/2022  Patient name: Kunal Thrasher  : 1970  MRN: [id number]  Referring provider: Paulino Chin, PT  Dx:   Encounter Diagnosis     ICD-10-CM    1  Chronic left shoulder pain  M25 512     G89 29                   Subjective: Reports that she has been feeling better with the stretches      Objective: See treatment diary below      Assessment: Tolerated treatment well  Patient would benefit from continued PT      Plan: Progress treatment as tolerated         Precautions: none       Manuals 10/3                         FMT left shoudler and cervical spine 15                                        Neuro Re-Ed                          cervical active elongation 10x10"            Upper thoracic extension over pillow fold with LTR ND                                                                Ther Ex                                                                                                                     Ther Activity                                       Gait Training                                       Modalities

## 2022-10-07 NOTE — PROGRESS NOTES
Assessment/Plan:  Consider GI evaluation if abdominal discomfort persists  Await ultrasound and lab testing  She is due her thyroid lab test   Anticipatory guidance provided  1  Well adult exam    2  Hypothyroidism, unspecified type  -     levothyroxine 50 mcg tablet; Take 1 tablet (50 mcg total) by mouth daily    3  Need for influenza vaccination  -     influenza vaccine, quadrivalent, recombinant, PF, 0 5 mL, for patients 18 yr+ (FLUBLOK)    4  Dyslipidemia  -     atorvastatin (LIPITOR) 10 mg tablet; Take 1 tablet (10 mg total) by mouth daily  -     CBC and differential  -     Comprehensive metabolic panel    5  Right upper quadrant pain  -     TSH, 3rd generation with Free T4 reflex; Future  -     US abdomen complete; Future; Expected date: 10/07/2022  -     CBC and differential  -     Comprehensive metabolic panel          Subjective:      Patient ID: Bhakti Pagan is a 46 y o  female  Patient here for well check  She also notes occasional right upper quadrant abdominal discomfort that is mild and very intermittent  Denies any back pain chest pain or shortness of breath  No changes in bowels  The following portions of the patient's history were reviewed and updated as appropriate: allergies, current medications, past family history, past medical history, past social history, past surgical history, and problem list     Review of Systems   Constitutional: Negative  HENT: Negative  Eyes: Negative  Respiratory: Negative  Cardiovascular: Negative  Gastrointestinal: Positive for abdominal pain (As noted in HPI)  Endocrine: Negative  Genitourinary: Negative  Musculoskeletal: Negative  Skin: Negative  Allergic/Immunologic: Negative  Neurological: Negative  Hematological: Negative  Psychiatric/Behavioral: Negative            Objective:      /82 (BP Location: Left arm, Patient Position: Sitting, Cuff Size: Standard)   Pulse 82   Temp 97 8 °F (36 6 °C) (Temporal)   Resp 12   Ht 5' 6" (1 676 m)   Wt 90 8 kg (200 lb 3 2 oz)   LMP  (LMP Unknown)   SpO2 98%   BMI 32 31 kg/m²          Physical Exam  Vitals reviewed  Constitutional:       Appearance: She is well-developed  HENT:      Head: Normocephalic and atraumatic  Right Ear: External ear normal  Tympanic membrane is not erythematous or bulging  Left Ear: External ear normal  Tympanic membrane is not erythematous or bulging  Nose: Nose normal       Mouth/Throat:      Mouth: No oral lesions  Pharynx: No oropharyngeal exudate  Eyes:      General: No scleral icterus  Right eye: No discharge  Left eye: No discharge  Conjunctiva/sclera: Conjunctivae normal    Neck:      Thyroid: No thyromegaly  Cardiovascular:      Rate and Rhythm: Normal rate and regular rhythm  Heart sounds: Normal heart sounds  No murmur heard  No friction rub  No gallop  Pulmonary:      Effort: Pulmonary effort is normal  No respiratory distress  Breath sounds: No wheezing or rales  Chest:      Chest wall: No tenderness  Abdominal:      General: Bowel sounds are normal  There is no distension  Palpations: Abdomen is soft  There is no mass  Tenderness: There is no abdominal tenderness  There is no guarding or rebound  Musculoskeletal:         General: No tenderness or deformity  Normal range of motion  Cervical back: Normal range of motion and neck supple  Lymphadenopathy:      Cervical: No cervical adenopathy  Skin:     General: Skin is warm and dry  Coloration: Skin is not pale  Findings: No erythema or rash  Neurological:      Mental Status: She is alert and oriented to person, place, and time  Cranial Nerves: No cranial nerve deficit  Motor: No abnormal muscle tone  Coordination: Coordination normal       Deep Tendon Reflexes: Reflexes are normal and symmetric     Psychiatric:         Behavior: Behavior normal

## 2022-10-10 ENCOUNTER — OFFICE VISIT (OUTPATIENT)
Dept: PHYSICAL THERAPY | Facility: CLINIC | Age: 52
End: 2022-10-10
Payer: COMMERCIAL

## 2022-10-10 DIAGNOSIS — M25.512 CHRONIC LEFT SHOULDER PAIN: Primary | ICD-10-CM

## 2022-10-10 DIAGNOSIS — G89.29 CHRONIC LEFT SHOULDER PAIN: Primary | ICD-10-CM

## 2022-10-10 PROCEDURE — 97140 MANUAL THERAPY 1/> REGIONS: CPT | Performed by: PHYSICAL THERAPIST

## 2022-10-10 PROCEDURE — 97112 NEUROMUSCULAR REEDUCATION: CPT | Performed by: PHYSICAL THERAPIST

## 2022-10-10 NOTE — PROGRESS NOTES
Daily Note     Today's date: 10/10/2022  Patient name: Kimberly Zarate  : 1970  MRN: [id number]  Referring provider: George Lan, PT  Dx:   Encounter Diagnosis     ICD-10-CM    1  Chronic left shoulder pain  M25 512     G89 29                   Subjective: Reports that she is responding well to Tx    Objective: See treatment diary below      Assessment: Tolerated treatment well  Patient would benefit from continued PT      Plan: Progress treatment as tolerated         Precautions: none       Manuals 10/10                         FMT left shoudler and cervical spine 15                                        Neuro Re-Ed                          cervical active elongation home            Upper thoracic extension over pillow fold with LTR ND            Prone on elbows chin tuck 10x10"            Wrist and elbow extension stretch at wall 10x10"                                      Ther Ex                                                                                                                     Ther Activity                                       Gait Training                                       Modalities

## 2022-10-12 ENCOUNTER — OFFICE VISIT (OUTPATIENT)
Dept: PHYSICAL THERAPY | Facility: CLINIC | Age: 52
End: 2022-10-12
Payer: COMMERCIAL

## 2022-10-12 DIAGNOSIS — M25.512 CHRONIC LEFT SHOULDER PAIN: Primary | ICD-10-CM

## 2022-10-12 DIAGNOSIS — G89.29 CHRONIC LEFT SHOULDER PAIN: Primary | ICD-10-CM

## 2022-10-12 PROCEDURE — 97110 THERAPEUTIC EXERCISES: CPT

## 2022-10-12 PROCEDURE — 97140 MANUAL THERAPY 1/> REGIONS: CPT

## 2022-10-12 PROCEDURE — 97112 NEUROMUSCULAR REEDUCATION: CPT

## 2022-10-12 NOTE — ASSESSMENT & PLAN NOTE
-Patient is pursuing Bardol[facility] Grille since she completed [facility]  -Initial weight loss goal of 5-10% weight loss for improved health- met and then regained  -Has not been practicing interval eating, food logging or performing and formal exercise routine  She has been skipping meals and snacks and not planning these out  Reports she is feeling somewhat unmotivated to make changes  Is on Prozac for depression for past 6 years, managed by PCP  She may benefit from dose increase vs switching to SSRI   Caution with Wellbutrin as she has eye condition that may increase her risk of developing glaucoma  -Will continue in [employer], may consider switching to weekly option    Initial:  198 9  Current: 190 3 lbs   Change: - 8 6 lbs (4%)  Goal: 170 lbs
-hgbA1c 5 9  -dietary/lifestyle changes  -can consider metformin
-stable on HCTZ  -continued weight loss may improve this condition  -denies symptoms of low BP
General

## 2022-10-12 NOTE — PROGRESS NOTES
Daily Note     Today's date: 10/12/2022  Patient name: Pernell Carcamo  : 1970  MRN: [id number]  Referring provider: Alonzo Hooks, PT  Dx:   Encounter Diagnosis     ICD-10-CM    1  Chronic left shoulder pain  M25 512     G89 29                   Subjective: Patient reports LUE goes numb with t-spine extensions  Pt reports overall improvements since starting PT  Objective: See treatment diary below      Assessment: Patient reports no increased pain or numbness in LUE throughout or post tx session  Plan: Continue per plan of care        Precautions: none       Manuals 10/10 10/12                        FMT left shoudler and cervical spine 15   HA                                     Neuro Re-Ed                          cervical active elongation home            Upper thoracic extension over pillow fold with LTR ND 10"x10           Prone on elbows chin tuck 10x10" 10x10"           Wrist and elbow extension stretch at wall 10x10" 10x10"                                     Ther Ex                                                                                                                     Ther Activity                                       Gait Training                                       Modalities

## 2022-10-17 ENCOUNTER — OFFICE VISIT (OUTPATIENT)
Dept: PHYSICAL THERAPY | Facility: CLINIC | Age: 52
End: 2022-10-17
Payer: COMMERCIAL

## 2022-10-17 DIAGNOSIS — M25.512 CHRONIC LEFT SHOULDER PAIN: Primary | ICD-10-CM

## 2022-10-17 DIAGNOSIS — G89.29 CHRONIC LEFT SHOULDER PAIN: Primary | ICD-10-CM

## 2022-10-17 PROCEDURE — 97112 NEUROMUSCULAR REEDUCATION: CPT | Performed by: PHYSICAL THERAPIST

## 2022-10-17 PROCEDURE — 97140 MANUAL THERAPY 1/> REGIONS: CPT | Performed by: PHYSICAL THERAPIST

## 2022-10-17 NOTE — PROGRESS NOTES
Daily Note     Today's date: 10/17/2022  Patient name: Og Odonnell  : 1970  MRN: [id number]  Referring provider: Dominick Simpson, PT  Dx:   Encounter Diagnosis     ICD-10-CM    1  Chronic left shoulder pain  M25 512     G89 29                   Subjective: Patient reports that she is doing better overall  Objective: See treatment diary below      Assessment: Patient responded well to treatment  Plan: Continue per plan of care        Precautions: none       Manuals 10/10 10/12 10/17                       FMT left shoudler and cervical spine 15   HA ND                                    Neuro Re-Ed                          cervical active elongation home  10x10"          Upper thoracic extension over pillow fold with LTR ND 10"x10 ND          Prone on elbows chin tuck 10x10" 10x10" ND          Wrist and elbow extension stretch at wall 10x10" 10x10" ND                                    Ther Ex                                                                                                                     Ther Activity                                       Gait Training                                       Modalities

## 2022-10-20 ENCOUNTER — OFFICE VISIT (OUTPATIENT)
Dept: PHYSICAL THERAPY | Facility: CLINIC | Age: 52
End: 2022-10-20
Payer: COMMERCIAL

## 2022-10-20 DIAGNOSIS — G89.29 CHRONIC LEFT SHOULDER PAIN: Primary | ICD-10-CM

## 2022-10-20 DIAGNOSIS — M25.512 CHRONIC LEFT SHOULDER PAIN: Primary | ICD-10-CM

## 2022-10-20 PROCEDURE — 97140 MANUAL THERAPY 1/> REGIONS: CPT | Performed by: PHYSICAL THERAPIST

## 2022-10-20 PROCEDURE — 97112 NEUROMUSCULAR REEDUCATION: CPT | Performed by: PHYSICAL THERAPIST

## 2022-10-20 NOTE — PROGRESS NOTES
Daily Note     Today's date: 10/20/2022  Patient name: Kimberly Zarate  : 1970  MRN: [id number]  Referring provider: George Lan, PT  Dx:   Encounter Diagnosis     ICD-10-CM    1  Chronic left shoulder pain  M25 512     G89 29                   Subjective: Patient reports that she is doing better overall  Objective: See treatment diary below      Assessment: Patient responded well to treatment  Plan: Continue per plan of care        Precautions: none       Manuals 10/10 10/12 10/17 10/20                      FMT left shoudler and cervical spine 15   HA ND ND                                   Neuro Re-Ed                          cervical active elongation home  10x10" ND         Upper thoracic extension over pillow fold with LTR ND 10"x10 ND ND         Prone on elbows chin tuck 10x10" 10x10" ND ND         Wrist and elbow extension stretch at wall 10x10" 10x10" ND ND         Pivot prone over 1/2 foam    10x10"                      Ther Ex                                                                                                                     Ther Activity                                       Gait Training                                       Modalities

## 2022-10-24 ENCOUNTER — OFFICE VISIT (OUTPATIENT)
Dept: PHYSICAL THERAPY | Facility: CLINIC | Age: 52
End: 2022-10-24
Payer: COMMERCIAL

## 2022-10-24 DIAGNOSIS — M25.512 CHRONIC LEFT SHOULDER PAIN: Primary | ICD-10-CM

## 2022-10-24 DIAGNOSIS — G89.29 CHRONIC LEFT SHOULDER PAIN: Primary | ICD-10-CM

## 2022-10-24 PROCEDURE — 97140 MANUAL THERAPY 1/> REGIONS: CPT | Performed by: PHYSICAL THERAPIST

## 2022-10-24 PROCEDURE — 97112 NEUROMUSCULAR REEDUCATION: CPT | Performed by: PHYSICAL THERAPIST

## 2022-10-24 NOTE — PROGRESS NOTES
Daily Note     Today's date: 10/24/2022  Patient name: Rochelle Boo  : 1970  MRN: [id number]  Referring provider: Rayshawn Crespo PT  Dx:   Encounter Diagnosis     ICD-10-CM    1  Chronic left shoulder pain  M25 512     G89 29                   Subjective: Patient reports that she is doing better overall  Objective: See treatment diary below      Assessment: Patient responded well to treatment  Plan: Continue per plan of care        Precautions: none       Manuals 10/10 10/12 10/17 10/20 10/24                     FMT left shoudler and cervical spine 15   HA ND ND ND                                  Neuro Re-Ed                          cervical active elongation home  10x10" ND ND        Upper thoracic extension over pillow fold with LTR ND 10"x10 ND ND ND        Prone on elbows chin tuck 10x10" 10x10" ND ND ND        Wrist and elbow extension stretch at wall 10x10" 10x10" ND ND home        Pivot prone over 1/2 foam    10x10" ND                     Ther Ex                                                                                                                     Ther Activity                                       Gait Training                                       Modalities

## 2022-10-26 ENCOUNTER — APPOINTMENT (OUTPATIENT)
Dept: PHYSICAL THERAPY | Facility: CLINIC | Age: 52
End: 2022-10-26

## 2022-11-04 ENCOUNTER — TELEMEDICINE (OUTPATIENT)
Dept: FAMILY MEDICINE CLINIC | Facility: CLINIC | Age: 52
End: 2022-11-04

## 2022-11-04 DIAGNOSIS — U07.1 COVID-19 VIRUS INFECTION: Primary | ICD-10-CM

## 2022-11-04 RX ORDER — NIRMATRELVIR AND RITONAVIR 300-100 MG
3 KIT ORAL 2 TIMES DAILY
Qty: 30 TABLET | Refills: 0 | Status: SHIPPED | OUTPATIENT
Start: 2022-11-04 | End: 2022-11-09

## 2022-11-04 NOTE — PROGRESS NOTES
COVID-19 Outpatient Progress Note    Assessment/Plan:    Problem List Items Addressed This Visit        Other    COVID-19 virus infection - Primary     New diagnosis acute symptomatic positive for covid 11/4/2022  Patient has had covid vaccine series and two boosters  Patient began with symptoms 11/4/2022 and reports HA, PND, NC/rhinorrhea, cough, and chills  Denies sob/chest pain/fever  Is a candidate for paxlovid, educated on s/e, contraindications, medication interactions, EUA, and is willing to receive  Educated to stop lipitor x 10 days (5 days while taking paxlovid and 5 days following completion)  Will recommend increase fluids, covid vitamins, start paxlovid, and call with worsening of symptoms  Relevant Medications    nirmatrelvir & ritonavir (Paxlovid, 300/100,) tablet therapy pack         Disposition:     Patient has asymptomatic or mild COVID-19 infection  Based off CDC guidelines, they were recommended to isolate for 5 days  If they are asymptomatic or symptoms are improving with no fevers in the past 24 hours, isolation may be ended followed by 5 days of wearing a mask when around othes to minimize risk of infecting others  If still have a fever or other symptoms have not improved, continue to isolate until they improve  Regardless of when they end isolation, avoid being around people who are more likely to get very sick from COVID-19 until at least day 11  I have spent 15 minutes directly with the patient  Greater than 50% of this time was spent in counseling/coordination of care regarding: instructions for management and patient and family education  Encounter provider: OMAR Griffin     Provider located at: [address] [place] [address] [phone]     Recent Visits  No visits were found meeting these conditions    Showing recent visits within past 7 days and meeting all other requirements  Today's Visits  Date Type Provider Dept   11/04/22 Telemedicine Paola Truckenmiller, CRNP [facility]   Showing today's visits and meeting all other requirements  Future Appointments  No visits were found meeting these conditions  Showing future appointments within next 150 days and meeting all other requirements     This virtual check-in was done via [address] and patient was informed that this is a secure, HIPAA-compliant platform  She agrees to proceed  Patient agrees to participate in a virtual check in via telephone or video visit instead of presenting to the office to address urgent/immediate medical needs  Patient is aware this is a billable service  She acknowledged consent and understanding of privacy and security of the video platform  The patient has agreed to participate and understands they can discontinue the visit at any time  After connecting through [facility], the patient was identified by name and date of birth  Aliya Herrera was informed that this was a telemedicine visit and that the exam was being conducted confidentially over secure lines  My office door was closed  No one else was in the room  Aliya Herrera acknowledged consent and understanding of privacy and security of the telemedicine visit  I informed the patient that I have reviewed her record in Epic and presented the opportunity for her to ask any questions regarding the visit today  The patient agreed to participate  Verification of patient location:  Patient is located in the following state in which I hold an active license: [place]    Subjective: Aliya Herrera is a 46 y o  female who has been screened for COVID-19  Patient's symptoms include chills, nasal congestion, rhinorrhea, cough and headache  Patient denies fever, fatigue, malaise, sore throat, anosmia, loss of taste, shortness of breath, chest tightness, abdominal pain, nausea, vomiting, diarrhea and myalgias       - Date of symptom onset: 11/4/2022  - Date of positive COVID-19 test: 11/4/2022  Type of test: Home antigen  Patient with typical symptoms of COVID-19 and they attest that they were positive on home rapid antigen testing  Image of positive result is not able to be uploaded into their chart  COVID-19 vaccination status: Fully vaccinated with booster    Arlen Herrera has been staying home and has isolated themselves in her home  She is taking care to not share personal items and is cleaning all surfaces that are touched often, like counters, tabletops, and doorknobs using household cleaning sprays or wipes  She is wearing a mask when she leaves her room  No results found for: SARSCOV2, [address] [facility], [address] [facility], CORONAVIRUSR, SARSCOVAG, [facility]    Review of Systems   Constitutional: Positive for chills  Negative for fatigue and fever  HENT: Positive for congestion, postnasal drip, rhinorrhea and sinus pressure  Negative for sore throat  Respiratory: Positive for cough  Negative for chest tightness and shortness of breath  Cardiovascular: Negative for chest pain and palpitations  Gastrointestinal: Negative for abdominal pain, diarrhea, nausea and vomiting  Musculoskeletal: Negative for myalgias  Neurological: Positive for headaches       Current Outpatient Medications on File Prior to Visit   Medication Sig   • acetaminophen (TYLENOL) 325 mg tablet Take by mouth as needed    • atorvastatin (LIPITOR) 10 mg tablet Take 1 tablet (10 mg total) by mouth daily   • carboxymethylcellulose (REFRESH PLUS) 0 5 % SOLN Apply to eye   • FLUoxetine (PROzac) 20 mg capsule Take 1 capsule (20 mg total) by mouth daily   • hydrochlorothiazide (HYDRODIURIL) 50 mg tablet Take 1 tablet (50 mg total) by mouth daily   • levothyroxine 50 mcg tablet Take 1 tablet (50 mcg total) by mouth daily   • loteprednol etabonate (LOTEMAX) 0 5 % ophth gel Administer 1 drop to the right eye Every other day       Objective:    LMP  (LMP Unknown)      Physical Exam  Vitals and nursing note reviewed  Constitutional:       General: She is not in acute distress  Appearance: Normal appearance  She is ill-appearing  She is not toxic-appearing or diaphoretic  HENT:      Head: Normocephalic and atraumatic  Nose: No rhinorrhea  Eyes:      General:         Right eye: No discharge  Left eye: No discharge  Pulmonary:      Effort: Pulmonary effort is normal  No respiratory distress  Musculoskeletal:         General: Normal range of motion  Cervical back: Normal range of motion  Skin:     Coloration: Skin is not jaundiced or pale  Findings: No bruising, erythema, lesion or rash  Neurological:      Mental Status: She is alert and oriented to person, place, and time  Psychiatric:         Mood and Affect: Mood normal          Behavior: Behavior normal          Thought Content:  Thought content normal          Judgment: Judgment normal        Paola Bartholomew

## 2022-11-04 NOTE — ASSESSMENT & PLAN NOTE
New diagnosis acute symptomatic positive for covid 11/4/2022  Patient has had covid vaccine series and two boosters  Patient began with symptoms 11/4/2022 and reports HA, PND, NC/rhinorrhea, cough, and chills  Denies sob/chest pain/fever  Is a candidate for paxlovid, educated on s/e, contraindications, medication interactions, EUA, and is willing to receive  Educated to stop lipitor x 10 days (5 days while taking paxlovid and 5 days following completion)  Will recommend increase fluids, covid vitamins, start paxlovid, and call with worsening of symptoms

## 2023-02-01 ENCOUNTER — HOSPITAL ENCOUNTER (OUTPATIENT)
Dept: ULTRASOUND IMAGING | Facility: MEDICAL CENTER | Age: 53
Discharge: HOME | End: 2023-02-01

## 2023-02-01 ENCOUNTER — APPOINTMENT (OUTPATIENT)
Dept: LAB | Facility: MEDICAL CENTER | Age: 53
End: 2023-02-01

## 2023-02-01 DIAGNOSIS — R10.11 RIGHT UPPER QUADRANT PAIN: ICD-10-CM

## 2023-02-01 LAB
ALBUMIN SERPL BCP-MCNC: 3.7 G/DL (ref 3.5–5)
ALP SERPL-CCNC: 111 U/L (ref 46–116)
ALT SERPL W P-5'-P-CCNC: 55 U/L (ref 12–78)
ANION GAP SERPL CALCULATED.3IONS-SCNC: 8 MMOL/L (ref 4–13)
AST SERPL W P-5'-P-CCNC: 29 U/L (ref 5–45)
BASOPHILS # BLD AUTO: 0.07 THOUSANDS/ÂΜL (ref 0–0.1)
BASOPHILS NFR BLD AUTO: 1 % (ref 0–1)
BILIRUB SERPL-MCNC: 0.47 MG/DL (ref 0.2–1)
BUN SERPL-MCNC: 17 MG/DL (ref 5–25)
CALCIUM SERPL-MCNC: 9.5 MG/DL (ref 8.3–10.1)
CHLORIDE SERPL-SCNC: 103 MMOL/L (ref 96–108)
CO2 SERPL-SCNC: 30 MMOL/L (ref 21–32)
CREAT SERPL-MCNC: 0.9 MG/DL (ref 0.6–1.3)
EOSINOPHIL # BLD AUTO: 0.1 THOUSAND/ÂΜL (ref 0–0.61)
EOSINOPHIL NFR BLD AUTO: 2 % (ref 0–6)
ERYTHROCYTE [DISTWIDTH] IN BLOOD BY AUTOMATED COUNT: 13.1 % (ref 11.6–15.1)
GFR SERPL CREATININE-BSD FRML MDRD: 73 ML/MIN/1.73SQ M
GLUCOSE P FAST SERPL-MCNC: 102 MG/DL (ref 65–99)
HCT VFR BLD AUTO: 37.9 % (ref 34.8–46.1)
HGB BLD-MCNC: 13.1 G/DL (ref 11.5–15.4)
IMM GRANULOCYTES # BLD AUTO: 0.02 THOUSAND/UL (ref 0–0.2)
IMM GRANULOCYTES NFR BLD AUTO: 0 % (ref 0–2)
LYMPHOCYTES # BLD AUTO: 1.32 THOUSANDS/ÂΜL (ref 0.6–4.47)
LYMPHOCYTES NFR BLD AUTO: 24 % (ref 14–44)
MCH RBC QN AUTO: 29.3 PG (ref 26.8–34.3)
MCHC RBC AUTO-ENTMCNC: 34.6 G/DL (ref 31.4–37.4)
MCV RBC AUTO: 85 FL (ref 82–98)
MONOCYTES # BLD AUTO: 0.41 THOUSAND/ÂΜL (ref 0.17–1.22)
MONOCYTES NFR BLD AUTO: 8 % (ref 4–12)
NEUTROPHILS # BLD AUTO: 3.58 THOUSANDS/ÂΜL (ref 1.85–7.62)
NEUTS SEG NFR BLD AUTO: 65 % (ref 43–75)
NRBC BLD AUTO-RTO: 0 /100 WBCS
PLATELET # BLD AUTO: 208 THOUSANDS/UL (ref 149–390)
PMV BLD AUTO: 11.2 FL (ref 8.9–12.7)
POTASSIUM SERPL-SCNC: 3.1 MMOL/L (ref 3.5–5.3)
PROT SERPL-MCNC: 7.2 G/DL (ref 6.4–8.4)
RBC # BLD AUTO: 4.47 MILLION/UL (ref 3.81–5.12)
SODIUM SERPL-SCNC: 141 MMOL/L (ref 135–147)
TSH SERPL DL<=0.05 MIU/L-ACNC: 2.91 UIU/ML (ref 0.45–4.5)
WBC # BLD AUTO: 5.5 THOUSAND/UL (ref 4.31–10.16)

## 2023-02-02 DIAGNOSIS — E87.6 HYPOKALEMIA: Primary | ICD-10-CM

## 2023-02-03 ENCOUNTER — APPOINTMENT (OUTPATIENT)
Dept: LAB | Facility: MEDICAL CENTER | Age: 53
End: 2023-02-03

## 2023-02-03 DIAGNOSIS — E87.6 HYPOKALEMIA: ICD-10-CM

## 2023-02-03 LAB
ANION GAP SERPL CALCULATED.3IONS-SCNC: 6 MMOL/L (ref 4–13)
BUN SERPL-MCNC: 17 MG/DL (ref 5–25)
CALCIUM SERPL-MCNC: 9.5 MG/DL (ref 8.3–10.1)
CHLORIDE SERPL-SCNC: 102 MMOL/L (ref 96–108)
CO2 SERPL-SCNC: 31 MMOL/L (ref 21–32)
CREAT SERPL-MCNC: 0.96 MG/DL (ref 0.6–1.3)
GFR SERPL CREATININE-BSD FRML MDRD: 68 ML/MIN/1.73SQ M
GLUCOSE P FAST SERPL-MCNC: 119 MG/DL (ref 65–99)
MAGNESIUM SERPL-MCNC: 2.6 MG/DL (ref 1.6–2.6)
POTASSIUM SERPL-SCNC: 3.2 MMOL/L (ref 3.5–5.3)
SODIUM SERPL-SCNC: 139 MMOL/L (ref 135–147)

## 2023-02-06 DIAGNOSIS — E87.6 HYPOKALEMIA: Primary | ICD-10-CM

## 2023-02-06 RX ORDER — POTASSIUM CHLORIDE 20 MEQ/1
20 TABLET, EXTENDED RELEASE ORAL DAILY
Qty: 90 TABLET | Refills: 3 | Status: SHIPPED | OUTPATIENT
Start: 2023-02-06

## 2023-02-27 ENCOUNTER — OFFICE VISIT (OUTPATIENT)
Dept: FAMILY MEDICINE CLINIC | Facility: CLINIC | Age: 53
End: 2023-02-27

## 2023-02-27 VITALS
HEART RATE: 90 BPM | OXYGEN SATURATION: 98 % | WEIGHT: 205 LBS | HEIGHT: 66 IN | SYSTOLIC BLOOD PRESSURE: 104 MMHG | DIASTOLIC BLOOD PRESSURE: 68 MMHG | TEMPERATURE: 96 F | BODY MASS INDEX: 32.95 KG/M2

## 2023-02-27 DIAGNOSIS — E87.6 HYPOKALEMIA: Primary | ICD-10-CM

## 2023-02-27 DIAGNOSIS — I10 ESSENTIAL HYPERTENSION: ICD-10-CM

## 2023-02-27 NOTE — PROGRESS NOTES
Assessment/Plan: Time spent counseling and reviewing treatment plan and coordinating care as well as documentation was 30 minutes  Card given for nephrology if she would like their opinion  Await recheck  Consider backing down on diuretic as noted  1  Hypokalemia  Assessment & Plan:  Recommend rechecking potassium levels in 4 weeks  Recommend continuing potassium daily  She is likely getting mild hypokalemia from diuretic use  Consider backing down on hydrochlorothiazide  Orders:  -     Basic metabolic panel; Future    2  Essential hypertension        Subjective:      Patient ID: Marcelo Park is a 46 y o  female      HPI         The following portions of the patient's history were reviewed and updated as appropriate: allergies, current medications, past family history, past medical history, past social history, past surgical history, and problem list     Review of Systems      Objective:      /68 (BP Location: Left arm, Patient Position: Sitting, Cuff Size: Standard)   Pulse 90   Temp (!) 96 °F (35 6 °C) (Tympanic)   Ht 5' 6" (1 676 m)   Wt 93 kg (205 lb)   LMP  (LMP Unknown)   SpO2 98%   BMI 33 09 kg/m²          Physical Exam

## 2023-02-27 NOTE — ASSESSMENT & PLAN NOTE
Recommend rechecking potassium levels in 4 weeks  Recommend continuing potassium daily  She is likely getting mild hypokalemia from diuretic use  Consider backing down on hydrochlorothiazide

## 2023-04-26 ENCOUNTER — OFFICE VISIT (OUTPATIENT)
Dept: PHYSICAL THERAPY | Facility: CLINIC | Age: 53
End: 2023-04-26

## 2023-04-26 DIAGNOSIS — M25.512 ACUTE PAIN OF LEFT SHOULDER: Primary | ICD-10-CM

## 2023-04-26 NOTE — PROGRESS NOTES
PT Evaluation     Today's date: 2023  Patient name: Hope Dinero  : 1970  MRN: [id number]  Referring provider: Nilay Cedeno PT  Dx:   Encounter Diagnosis     ICD-10-CM    1  Acute pain of left shoulder  M25 512                      Assessment  Assessment details: Patient is a 46 y o  female who presents to physical therapy with physician diagnosis of Acute pain of left shoulder  (primary encounter diagnosis)  Patient would benefit from skilled physical therapy intervention to address hrer impairments and to maximize function  Thank you for your referral and please feel free to contact me at [phone]  Understanding of Dx/Px/POC: good   Prognosis: good    Goals  STG 4 weeks  Decrease pain by 50%  Improve AROM by 50%    LTG 8 weeks  No pain with ADL's or sleeping    Plan  Patient would benefit from: skilled physical therapy  Referral necessary: No  Frequency: 2x week  Duration in weeks: 8  Treatment plan discussed with: patient        Subjective Evaluation    History of Present Illness  Mechanism of injury: Patient reports that last Friday 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 but did not feel immediate pain  She began noticing pain and feeling like she could not raise her arm  She feels that holding her arm against her side helps  Denies UE radicular pain  Pain is worse with movement during the day  Taking aleve PRN            Not a recurrent problem   Quality of life: good    Pain  Current pain ratin  At best pain ratin  Quality: dull ache and sharp  Progression: improved      Diagnostic Tests  No diagnostic tests performed        Objective     General Comments:      Shoulder Comments   (+) impingement with HK test  (-) Neer  (-) Yergason  (-) ULTT  (-) Drop arm and ER lag test  Pain with PROM IR  MMT ER WNL  Severe imitation and pain with initiation of elevation  Hypomobile left first rib and limited T1/2 ERS left             Precautions: none      Manuals 4/26                         Left first rib and T1/2 mobs ND                                      Neuro Re-Ed                          Self first rib towel MWM ND                                                                             Ther Ex                                                                                                                     Ther Activity                                       Gait Training                                       Modalities

## 2023-05-01 ENCOUNTER — OFFICE VISIT (OUTPATIENT)
Dept: PHYSICAL THERAPY | Facility: CLINIC | Age: 53
End: 2023-05-01

## 2023-05-01 DIAGNOSIS — M25.512 ACUTE PAIN OF LEFT SHOULDER: Primary | ICD-10-CM

## 2023-05-01 NOTE — PROGRESS NOTES
Daily Note     Today's date: 2023  Patient name: Ninfa Bull  : 1970  MRN: [id number]  Referring provider: Dinaa Dao PT  Dx:   Encounter Diagnosis     ICD-10-CM    1  Acute pain of left shoulder  M25 512                      Subjective: Reports a significant improvement since last week  Objective: See treatment diary below      Assessment: Tolerated treatment well  Patient would benefit from continued PT  Full A/PROM left shoulder post tx      Plan: Continue per plan of care        Precautions: none      Manuals 5/1                         Left first rib and T1/2 mobs ND                                      Neuro Re-Ed                          Self first rib towel MWM ND                                                                             Ther Ex                                                                                                                     Ther Activity                                       Gait Training                                       Modalities

## 2023-05-03 ENCOUNTER — OFFICE VISIT (OUTPATIENT)
Dept: PHYSICAL THERAPY | Facility: CLINIC | Age: 53
End: 2023-05-03

## 2023-05-03 DIAGNOSIS — M25.512 ACUTE PAIN OF LEFT SHOULDER: Primary | ICD-10-CM

## 2023-05-03 NOTE — PROGRESS NOTES
"Daily Note     Today's date: 5/3/2023  Patient name: Harriet Perez  : 1970  MRN: [id number]  Referring provider: Caterina Saleh, LAKIA  Dx:   Encounter Diagnosis     ICD-10-CM    1  Acute pain of left shoulder  M25 512                      Subjective: Reports >90% improvement overall    Objective: See treatment diary below    Assessment: Tolerated treatment well  Patient would benefit from continued PT  Full A/PROM left shoulder post tx    Plan: Continue per plan of care        Precautions: none      Manuals 5/3                         Left first rib and T1/2 mobs ND                                      Neuro Re-Ed                          Self first rib towel MWM ND            UE dial 10x10\"            Upper thoracic extension over 1/2 foam with LTR and shoulder flexion ND                                                   Ther Ex                                                                                                                     Ther Activity                                       Gait Training                                       Modalities                                            "

## 2023-05-08 ENCOUNTER — OFFICE VISIT (OUTPATIENT)
Dept: PHYSICAL THERAPY | Facility: CLINIC | Age: 53
End: 2023-05-08

## 2023-05-08 DIAGNOSIS — M25.512 ACUTE PAIN OF LEFT SHOULDER: Primary | ICD-10-CM

## 2023-05-08 NOTE — PROGRESS NOTES
"Daily Note     Today's date: 2023  Patient name: Eric Pope  : 1970  MRN: [id number]  Referring provider: Mia Rivera PT  Dx:   Encounter Diagnosis     ICD-10-CM    1  Acute pain of left shoulder  M25 512                      Subjective: Reports having increased discomfort after yardwork yesterday  Objective: See treatment diary below    Assessment: Tolerated treatment well  Patient would benefit from continued PT  Full A/PROM left shoulder post tx    Plan: Continue per plan of care        Precautions: none      Manuals /                         Left first rib and T1/2 mobs ND                                      Neuro Re-Ed                          Self first rib towel MWM ND            UE dial 10x10\"            Upper thoracic extension over 1/2 foam with LTR and shoulder flexion ND                                                   Ther Ex                                                                                                                     Ther Activity                                       Gait Training                                       Modalities                                            "

## 2023-05-10 ENCOUNTER — OFFICE VISIT (OUTPATIENT)
Dept: PHYSICAL THERAPY | Facility: CLINIC | Age: 53
End: 2023-05-10

## 2023-05-10 DIAGNOSIS — M25.512 ACUTE PAIN OF LEFT SHOULDER: Primary | ICD-10-CM

## 2023-05-10 NOTE — PROGRESS NOTES
"Daily Note     Today's date: 5/10/2023  Patient name: Riya Domingo  : 1970  MRN: [id number]  Referring provider: Calin Gipson, PT  Dx:   Encounter Diagnosis     ICD-10-CM    1  Acute pain of left shoulder  M25 512                      Subjective: Reports having continued discomfort with abduction  Objective: See treatment diary below    Assessment: Tolerated treatment well  Patient would benefit from continued PT  Plan: Continue per plan of care        Precautions: none      Manuals 5/10                         Left first rib and T1/2 mobs ND            GH joint mobs inferior ND            STM to axilla ND            Neuro Re-Ed                          Self first rib towel MWM home            UE dial 10x10\"            Upper thoracic extension over 1/2 foam with LTR and shoulder flexion home            pec stretch at wall 3x30\"                                      Ther Ex                                                                                                                     Ther Activity                                       Gait Training                                       Modalities                                            "

## 2023-05-18 ENCOUNTER — APPOINTMENT (OUTPATIENT)
Dept: PHYSICAL THERAPY | Facility: CLINIC | Age: 53
End: 2023-05-18
Payer: COMMERCIAL

## 2023-05-22 ENCOUNTER — OFFICE VISIT (OUTPATIENT)
Dept: PHYSICAL THERAPY | Facility: CLINIC | Age: 53
End: 2023-05-22

## 2023-05-22 DIAGNOSIS — M25.512 ACUTE PAIN OF LEFT SHOULDER: Primary | ICD-10-CM

## 2023-05-22 NOTE — PROGRESS NOTES
"Daily Note     Today's date: 2023  Patient name: Parth Trivedi  : 1970  MRN: [id number]  Referring provider: Roma Oh PT  Dx:   Encounter Diagnosis     ICD-10-CM    1  Acute pain of left shoulder  M25 512                      Subjective: Reports having continued discomfort with abduction  Objective: See treatment diary below    Assessment: Tolerated treatment well  Patient would benefit from continued PT  Plan: Continue per plan of care        Precautions: none      Manuals                          Left first rib and T1/2 mobs ND            GH joint mobs inferior ND            STM to axilla ND            Neuro Re-Ed                          Self first rib towel MWM home            UE dial 10x10\"            Upper thoracic extension over 1/2 foam with LTR and shoulder flexion home            pec stretch at wall 3x30\"                                      Ther Ex                                                                                                                     Ther Activity                                       Gait Training                                       Modalities             [facility]  10                              "

## 2023-06-01 ENCOUNTER — OFFICE VISIT (OUTPATIENT)
Dept: PHYSICAL THERAPY | Facility: CLINIC | Age: 53
End: 2023-06-01

## 2023-06-01 DIAGNOSIS — M25.512 ACUTE PAIN OF LEFT SHOULDER: Primary | ICD-10-CM

## 2023-06-01 NOTE — PROGRESS NOTES
"Daily Note     Today's date: 2023  Patient name: Odessa Cabrera  : 1970  MRN: [id number]  Referring provider: Jean Kelly, PT  Dx:   Encounter Diagnosis     ICD-10-CM    1  Acute pain of left shoulder  M25 512                      Subjective: Reports 98% improvement    Objective: See treatment diary below    Assessment: Tolerated treatment well  Patient would benefit from continued PT  Plan: discharge from PT      Precautions: none      Manuals /                         Left first rib and T1/2 mobs ND            GH joint mobs inferior ND            STM to axilla ND            Neuro Re-Ed                          Self first rib towel MWM home            UE dial 10x10\"            Upper thoracic extension over 1/2 foam with LTR and shoulder flexion ND            pec stretch at wall 3x30\"                                      Ther Ex                                                                                                                     Ther Activity                                       Gait Training                                       Modalities             [facility]  10'                              "

## 2023-07-24 DIAGNOSIS — I10 ESSENTIAL HYPERTENSION: ICD-10-CM

## 2023-07-25 RX ORDER — HYDROCHLOROTHIAZIDE 50 MG/1
TABLET ORAL
Qty: 90 TABLET | Refills: 0 | Status: SHIPPED | OUTPATIENT
Start: 2023-07-25

## 2023-08-24 ENCOUNTER — APPOINTMENT (OUTPATIENT)
Dept: LAB | Facility: HOSPITAL | Age: 53
End: 2023-08-24

## 2023-08-24 DIAGNOSIS — Z00.8 HEALTH EXAMINATION IN POPULATION SURVEY: ICD-10-CM

## 2023-08-24 LAB
CHOLEST SERPL-MCNC: 185 MG/DL
EST. AVERAGE GLUCOSE BLD GHB EST-MCNC: 120 MG/DL
HBA1C MFR BLD: 5.8 %
HDLC SERPL-MCNC: 33 MG/DL
LDLC SERPL CALC-MCNC: 105 MG/DL (ref 0–100)
NONHDLC SERPL-MCNC: 152 MG/DL
TRIGL SERPL-MCNC: 235 MG/DL

## 2023-08-24 PROCEDURE — 80061 LIPID PANEL: CPT

## 2023-08-24 PROCEDURE — 83036 HEMOGLOBIN GLYCOSYLATED A1C: CPT

## 2023-08-24 PROCEDURE — 36415 COLL VENOUS BLD VENIPUNCTURE: CPT

## 2023-08-30 ENCOUNTER — OFFICE VISIT (OUTPATIENT)
Dept: BARIATRICS | Facility: CLINIC | Age: 53
End: 2023-08-30
Payer: COMMERCIAL

## 2023-08-30 VITALS — BODY MASS INDEX: 32.3 KG/M2 | WEIGHT: 201 LBS | HEIGHT: 66 IN

## 2023-08-30 DIAGNOSIS — E66.09 CLASS 1 OBESITY DUE TO EXCESS CALORIES WITH SERIOUS COMORBIDITY AND BODY MASS INDEX (BMI) OF 32.0 TO 32.9 IN ADULT: ICD-10-CM

## 2023-08-30 PROCEDURE — S9470 NUTRITIONAL COUNSELING, DIET: HCPCS

## 2023-08-30 PROCEDURE — RECHECK

## 2023-08-30 NOTE — PROGRESS NOTES
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 Last seen 2022. Current wt: 201 lbs. She has gained 8.6 lbs from 2022 and unfortunately is 2.1 lbs > initial starting weight in 2018. Excess calories from grazing between meals. Discussed that this could be due to inadequate intake earlier in the day. She would like to use a meal replacement for breakfast and incorporate a midmorning snack. Aware of the benefits of food logging/measuring. Her  has recently changed his diet due to medical issues. She does still have gym membership but has not been using. Encouraged resume logging, structured eating (not going longer than 4 hrs) and increase fluid intake. Discussed how inadequate hydration may increase hunger.  She will f/u in ~1 month.      Patient seen by Medical Provider in past 6 months:  yes  Requested to schedule appointment with Medical Provider: No      Anthropometric Measurements  Start Weight (#): 198.9# (18)  Current Weight (#): 201#  TBW % Change from start weight: gain 1%  Ideal Body Weight (#):130#   Goal Weight (#):ST#       Weight Loss History  Previous weight loss attempts: Commercial Programs (Weight Watchers, [employer], etc.)  Counseling with  MD  Self Created Diets (Portion Control, Healthy Food Choices, etc.)     Food and Nutrition Related History   wake: 6:30 a.m.   Bed: 10 p.m.     Food Recall  Breakfast: 7:00: coffee w/2% milk, 1tsp honey or sugar, greek yogurt or 2% cottage cheese w/[employer] [place] protein granola 3 tbsp, sometimes fruit      Snack: skip  Lunch:11:00: leftovers OR 2 slices smoked turkey, cheese, kieser roll, 1 tsp travis  Snack: grazing on unsalted cashews (does try to measure), chips, cookies/brownie  Dinner:6:00: eggplant parm w/cheese, garlic bread OR tuna s/w on roll or tatianna's OR ~6 oz protein, baked potato, 1 tbsp butter, veggies, sometimes corn on cob  Snack: small ice cream cup      Beverages: water, coffee  Volume of beverage intake: 48 oz water, 2 cups coffee     Weekends: Same  Cravings: salty carbs  Trouble area of day:between meal snacking     Frequency of Eating out: irregularly  Food restrictions:none reported  Cooking: both  Food Shopping: both     Physical Activity Intake  Activity:[facility]   Frequency:  not going  Physical limitations/barriers to exercise: none reported     Estimated Needs  Energy  B[employer] Energy Needs: BMR : 1533 calories   1-2# loss weekly sedentary:  840-1340 calories             1-2# loss weekly lightly active:3161-4706 calories  Maintenance calories for sedentary activity level: 1840 calories  Protein:70-88gm      (1.2-1.5g/kg IBW)  Fluid: 69oz     (35mL/kg IBW)     Nutrition Diagnosis  Yes;    Overweight/obesity  related to Excess energy intake as evidenced by  BMI more than normative standard for age and sex (obesity-grade I 30-34. 9)     Nutrition Intervention     Nutrition Prescription  Calories:8675-3267  Protein: 100-115gm     Meal Plan ([place]/Pro)  Breakfast: 200, 27  Snack: 100-150, 5-10  Lunch: 350-400, 20-30  Snack: 160, 15  Dinner: 400, 30  Snack: 0-150, 0-10     Nutrition Education:    Healthy Core Manual  Calorie controlled menu  Lean protein food choices  Healthy snack options  Food journaling tips        Nutrition Counseling:  Strategies: meal planning, portion sizes, healthy snack choices, hydration, fiber intake, protein intake, exercise, food journal        Monitoring and Evaluation:  Evaluation criteria:  Energy Intake  Meet protein needs  Maintain adequate hydration  Monitor weekly weight  Meal planning/preparation  Food journal   Decreased portions at mealtimes and snacks  Physical activity      Barriers to learning:none  Readiness to change: preparation  Comprehension: good  Expected Compliance: good

## 2023-09-07 DIAGNOSIS — F41.1 GENERALIZED ANXIETY DISORDER: ICD-10-CM

## 2023-09-07 RX ORDER — FLUOXETINE HYDROCHLORIDE 20 MG/1
CAPSULE ORAL
Qty: 90 CAPSULE | Refills: 0 | Status: SHIPPED | OUTPATIENT
Start: 2023-09-07

## 2023-09-26 NOTE — PROGRESS NOTES
Weight Management Medical Nutrition Assessment  Ricardo Orosco here for employee meal planning . Current wt:  200 lbs. She has lost 1  Lbs x 1 month. She reports she has been more active, recently went to the gym and is walking at home 3x/wk. Still struggling with logging and grazing in the afternoon. Hydration likely still lacking. Reminded that she might be feeling hunger when it is actually thirst. Recommend measure portions even if not tracking. Questions answered: food logging apps. 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  She will f/u in ~1 month.      Patient seen by Medical Provider in past 6 months:  yes  Requested to schedule appointment with Medical Provider: No      Anthropometric Measurements  Start Weight (#): 198.9# (18)  Current Weight (#): 201#  TBW % Change from start weight: gain 1%  Ideal Body Weight (#):130#   Goal Weight (#):ST#       Weight Loss History  Previous weight loss attempts: Commercial Programs (Weight Watchers, [employer], etc.)  Counseling with  MD  Self Created Diets (Portion Control, Healthy Food Choices, etc.)     Food and Nutrition Related History   wake: 6:30 a.m.   Bed: 10 p.m.     Food Recall  Breakfast: 7:00: meal replacement OR  1 1/2 egg, bruce, english muffin, coffee w/2% milk, 1tsp honey or sugar OR cottage cheese w/1 tbsp dried cherries, ~1 tbsp granola   Snack: skip OR cottage cheese OR apple  IRDUE:67:38:  2 slices smoked turkey, cheese, kieser roll, 1 tsp travis, potato chips  Snack: grazing on unsalted cashews (does try to measure), chips, cookies/brownie OR bar  Dinner: 6:00: eggplant parm w/cheese, garlic bread OR tuna s/w on roll or tatianna's OR ~6 oz protein, baked potato, 1 tbsp butter, veggies, sometimes corn on cob OR lamb shanks, potatoes, veggies  Snack: skip OR chips/salsa OR yasso      Beverages: water, coffee  Volume of beverage intake: 48 oz water, 2 cups coffee     Weekends: Same  Cravings: salty carbs  Trouble area of day:between meal snacking     Frequency of Eating out: irregularly  Food restrictions:none reported  Cooking: both  Food Shopping: both     Physical Activity Intake  Activity: walking  Frequency:  3x/wk  Physical limitations/barriers to exercise: none reported     Estimated Needs  Energy  B[employer] Energy Needs: BMR : 1533 calories   1-2# loss weekly sedentary:  840-1340 calories             1-2# loss weekly lightly active:7693-0751 calories  Maintenance calories for sedentary activity level: 1840 calories  Protein:70-88gm      (1.2-1.5g/kg IBW)  Fluid: 69oz     (35mL/kg IBW)     Nutrition Diagnosis  Yes;    Overweight/obesity  related to Excess energy intake as evidenced by  BMI more than normative standard for age and sex (obesity-grade I 30-34. 9)     Nutrition Intervention     Nutrition Prescription  Calories:8062-7293  Protein: 100-115gm     Meal Plan (Fernandez/Pro)  Breakfast: 200, 27  Snack: 100-150, 5-10  Lunch: 350-400, 20-30  Snack: 160, 15  Dinner: 400, 30  Snack: 0-150, 0-10     Nutrition Education:    Healthy Core Manual  Calorie controlled menu  Lean protein food choices  Healthy snack options  Food journaling tips        Nutrition Counseling:  Strategies: meal planning, portion sizes, healthy snack choices, hydration, fiber intake, protein intake, exercise, food journal        Monitoring and Evaluation:  Evaluation criteria:  Energy Intake  Meet protein needs  Maintain adequate hydration  Monitor weekly weight  Meal planning/preparation  Food journal   Decreased portions at mealtimes and snacks  Physical activity      Barriers to learning:none  Readiness to change: preparation & action  Comprehension: good  Expected Compliance: good

## 2023-09-28 ENCOUNTER — OFFICE VISIT (OUTPATIENT)
Dept: BARIATRICS | Facility: CLINIC | Age: 53
End: 2023-09-28
Payer: COMMERCIAL

## 2023-09-28 VITALS — BODY MASS INDEX: 32.14 KG/M2 | WEIGHT: 200 LBS | HEIGHT: 66 IN

## 2023-09-28 DIAGNOSIS — E66.09 CLASS 1 OBESITY DUE TO EXCESS CALORIES WITH SERIOUS COMORBIDITY AND BODY MASS INDEX (BMI) OF 32.0 TO 32.9 IN ADULT: ICD-10-CM

## 2023-09-28 PROCEDURE — RECHECK

## 2023-09-28 PROCEDURE — S9470 NUTRITIONAL COUNSELING, DIET: HCPCS

## 2023-10-09 ENCOUNTER — APPOINTMENT (OUTPATIENT)
Dept: LAB | Facility: MEDICAL CENTER | Age: 53
End: 2023-10-09
Payer: COMMERCIAL

## 2023-10-09 DIAGNOSIS — E87.6 HYPOKALEMIA: ICD-10-CM

## 2023-10-09 LAB
ANION GAP SERPL CALCULATED.3IONS-SCNC: 10 MMOL/L
BUN SERPL-MCNC: 18 MG/DL (ref 5–25)
CALCIUM SERPL-MCNC: 9.6 MG/DL (ref 8.4–10.2)
CHLORIDE SERPL-SCNC: 100 MMOL/L (ref 96–108)
CO2 SERPL-SCNC: 31 MMOL/L (ref 21–32)
CREAT SERPL-MCNC: 0.84 MG/DL (ref 0.6–1.3)
GFR SERPL CREATININE-BSD FRML MDRD: 79 ML/MIN/1.73SQ M
GLUCOSE P FAST SERPL-MCNC: 102 MG/DL (ref 65–99)
POTASSIUM SERPL-SCNC: 3.2 MMOL/L (ref 3.5–5.3)
SODIUM SERPL-SCNC: 141 MMOL/L (ref 135–147)

## 2023-10-09 PROCEDURE — 80048 BASIC METABOLIC PNL TOTAL CA: CPT

## 2023-10-09 PROCEDURE — 36415 COLL VENOUS BLD VENIPUNCTURE: CPT

## 2023-10-10 ENCOUNTER — HOSPITAL ENCOUNTER (OUTPATIENT)
Dept: MAMMOGRAPHY | Facility: MEDICAL CENTER | Age: 53
Discharge: HOME | End: 2023-10-10
Payer: COMMERCIAL

## 2023-10-10 VITALS — BODY MASS INDEX: 32.14 KG/M2 | WEIGHT: 199.96 LBS | HEIGHT: 66 IN

## 2023-10-10 DIAGNOSIS — Z12.31 ENCOUNTER FOR SCREENING MAMMOGRAM FOR MALIGNANT NEOPLASM OF BREAST: ICD-10-CM

## 2023-10-10 PROCEDURE — 77067 SCR MAMMO BI INCL CAD: CPT

## 2023-10-10 PROCEDURE — 77063 BREAST TOMOSYNTHESIS BI: CPT

## 2023-10-27 ENCOUNTER — OFFICE VISIT (OUTPATIENT)
Dept: FAMILY MEDICINE CLINIC | Facility: CLINIC | Age: 53
End: 2023-10-27
Payer: COMMERCIAL

## 2023-10-27 VITALS
HEART RATE: 82 BPM | TEMPERATURE: 97 F | DIASTOLIC BLOOD PRESSURE: 61 MMHG | OXYGEN SATURATION: 97 % | HEIGHT: 66 IN | SYSTOLIC BLOOD PRESSURE: 129 MMHG | WEIGHT: 200 LBS | BODY MASS INDEX: 32.14 KG/M2

## 2023-10-27 DIAGNOSIS — S46.912A STRAIN OF LEFT SHOULDER, INITIAL ENCOUNTER: ICD-10-CM

## 2023-10-27 DIAGNOSIS — Z00.00 WELL ADULT EXAM: Primary | ICD-10-CM

## 2023-10-27 DIAGNOSIS — E03.9 HYPOTHYROIDISM, UNSPECIFIED TYPE: ICD-10-CM

## 2023-10-27 DIAGNOSIS — E87.6 HYPOKALEMIA: ICD-10-CM

## 2023-10-27 PROCEDURE — 99396 PREV VISIT EST AGE 40-64: CPT | Performed by: FAMILY MEDICINE

## 2023-10-27 NOTE — PROGRESS NOTES
Assessment/Plan: Patient with continued unexplained hypokalemia. Recommend follow-up with neurology on this. She does note occasional symptoms of strain to the left shoulder. She would like to start with physical therapy. Referral order placed. Continue regular follow-up with gynecologist.     1. Well adult exam    2. Hypokalemia  -     Ambulatory Referral to Nephrology; Future  -     Basic metabolic panel; Future  -     TSH, 3rd generation with Free T4 reflex; Future    3. Hypothyroidism, unspecified type  -     Basic metabolic panel; Future  -     TSH, 3rd generation with Free T4 reflex; Future    4. Strain of left shoulder, initial encounter  -     Ambulatory Referral to Physical Therapy; Future          Subjective:      Patient ID: Maikel Delvalle is a 48 y.o. female. Patient here for well check. Generally feeling well. She has had some hypokalemia with taking potassium pills. Up-to-date on screening. Depression Screening and Follow-up Plan: Patient was screened for depression during today's encounter. They screened negative with a PHQ-2 score of 0. The following portions of the patient's history were reviewed and updated as appropriate: allergies, current medications, past family history, past medical history, past social history, past surgical history, and problem list.    Review of Systems   Constitutional: Negative. HENT: Negative. Eyes: Negative. Respiratory: Negative. Cardiovascular: Negative. Gastrointestinal: Negative. Endocrine: Negative. Genitourinary: Negative. Musculoskeletal: Negative. Skin: Negative. Allergic/Immunologic: Negative. Neurological: Negative. Hematological: Negative. Psychiatric/Behavioral: Negative.            Objective:      /61 (BP Location: Left arm, Patient Position: Sitting, Cuff Size: Standard)   Pulse 82   Temp (!) 97 °F (36.1 °C) (Tympanic)   Ht 5' 6" (1.676 m)   Wt 90.7 kg (200 lb)   LMP  (LMP Unknown)   SpO2 97%   BMI 32.28 kg/m²          Physical Exam  Vitals reviewed. Constitutional:       Appearance: She is well-developed. HENT:      Head: Normocephalic and atraumatic. Right Ear: External ear normal. Tympanic membrane is not erythematous or bulging. Left Ear: External ear normal. Tympanic membrane is not erythematous or bulging. Nose: Nose normal.      Mouth/Throat:      Mouth: No oral lesions. Pharynx: No oropharyngeal exudate. Eyes:      General: No scleral icterus. Right eye: No discharge. Left eye: No discharge. Conjunctiva/sclera: Conjunctivae normal.   Neck:      Thyroid: No thyromegaly. Cardiovascular:      Rate and Rhythm: Normal rate and regular rhythm. Heart sounds: Normal heart sounds. No murmur heard. No friction rub. No gallop. Pulmonary:      Effort: Pulmonary effort is normal. No respiratory distress. Breath sounds: No wheezing or rales. Chest:      Chest wall: No tenderness. Abdominal:      General: Bowel sounds are normal. There is no distension. Palpations: Abdomen is soft. There is no mass. Tenderness: There is no abdominal tenderness. There is no guarding or rebound. Musculoskeletal:         General: No tenderness or deformity. Normal range of motion. Cervical back: Normal range of motion and neck supple. Lymphadenopathy:      Cervical: No cervical adenopathy. Skin:     General: Skin is warm and dry. Coloration: Skin is not pale. Findings: No erythema or rash. Neurological:      Mental Status: She is alert and oriented to person, place, and time. Cranial Nerves: No cranial nerve deficit. Motor: No abnormal muscle tone. Coordination: Coordination normal.      Deep Tendon Reflexes: Reflexes are normal and symmetric.    Psychiatric:         Behavior: Behavior normal.

## 2023-10-30 DIAGNOSIS — I10 ESSENTIAL HYPERTENSION: ICD-10-CM

## 2023-10-30 DIAGNOSIS — F41.1 GENERALIZED ANXIETY DISORDER: ICD-10-CM

## 2023-10-30 RX ORDER — HYDROCHLOROTHIAZIDE 50 MG/1
50 TABLET ORAL DAILY
Qty: 90 TABLET | Refills: 0 | Status: SHIPPED | OUTPATIENT
Start: 2023-10-30

## 2023-10-30 RX ORDER — FLUOXETINE HYDROCHLORIDE 20 MG/1
20 CAPSULE ORAL DAILY
Qty: 90 CAPSULE | Refills: 0 | Status: SHIPPED | OUTPATIENT
Start: 2023-10-30

## 2023-10-30 NOTE — TELEPHONE ENCOUNTER
hydrochlorothiazide (HYDRODIURIL) 50 mg tablet         Sig: Take 1 tablet (50 mg total) by mouth daily    Disp: 90 tablet    Refills: 0    Start: 10/30/2023    Class: Normal    For: Essential hypertension    Last ordered: 3 months ago (7/25/2023) by Lidia Causey DO    Cardiovascular: Diuretics - Thiazide [id number]/30/2023 11:46 AM   Protocol Details K in normal range and within 360 days    BP completed in the last 6 months    Ca in normal range and within 360 days    Na in normal range and within 360 days    eGFR is 60 or above and within 360 days    Valid encounter within last 6 months      To be filled at: [facility], [address] [place] [address],

## 2023-11-02 ENCOUNTER — EVALUATION (OUTPATIENT)
Dept: PHYSICAL THERAPY | Facility: CLINIC | Age: 53
End: 2023-11-02
Payer: COMMERCIAL

## 2023-11-02 DIAGNOSIS — M25.512 CHRONIC LEFT SHOULDER PAIN: Primary | ICD-10-CM

## 2023-11-02 DIAGNOSIS — G89.29 CHRONIC LEFT SHOULDER PAIN: Primary | ICD-10-CM

## 2023-11-02 PROCEDURE — 97110 THERAPEUTIC EXERCISES: CPT | Performed by: PHYSICAL THERAPIST

## 2023-11-03 PROCEDURE — 97162 PT EVAL MOD COMPLEX 30 MIN: CPT | Performed by: PHYSICAL THERAPIST

## 2023-11-03 NOTE — PROGRESS NOTES
PT Evaluation     Today's date: 11/3/2023  Patient name: Parag Aceves  : 1970  MRN: [id number]  Referring provider: Efrain Araujo DO  Dx:   Encounter Diagnosis     ICD-10-CM    1. Chronic left shoulder pain  M25.512     G89.29                      Assessment  Assessment details: Patient is a 48 y.o. female who presents to physical therapy with physician diagnosis of chronic pain of left shoulder  (primary encounter diagnosis). Patient would benefit from skilled physical therapy intervention to address hrer impairments and to maximize function. Thank you for your referral and please feel free to contact me at [phone]. 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 She began noticing pain and feeling like she could not raise her arm. She feels that holding her arm against her side helps. Denies UE radicular pain. Pain is worse with movement during the day. Taking aleve PRN.           Not a recurrent problem   Quality of life: good    Patient Goals  Patient goals for therapy: decreased pain, increased motion, increased strength, independence with ADLs/IADLs and return to work    Pain  Current pain ratin  At best pain ratin  At worst pain ratin  Quality: dull ache  Progression: no change      Diagnostic Tests  No diagnostic tests performed        Objective     General Comments:      Shoulder Comments   Moderate soft tissue restrictions   Hypomobile left first rib and limited T1/2 ERS left  PROM and AROM WNL  MMT WNL  Posterior and inferior GH hyopmobile             Precautions: none      Manuals 11/2                         Left first rib and T1/2 mobs ND                                      Neuro Re-Ed                          Self first rib towel MWM ND            UE Dial  10x10"                                                                Ther Ex                                                                                                                     Ther Activity                                       Gait Training                                       Modalities

## 2023-11-09 ENCOUNTER — OFFICE VISIT (OUTPATIENT)
Dept: PHYSICAL THERAPY | Facility: CLINIC | Age: 53
End: 2023-11-09
Payer: COMMERCIAL

## 2023-11-09 DIAGNOSIS — G89.29 CHRONIC LEFT SHOULDER PAIN: Primary | ICD-10-CM

## 2023-11-09 DIAGNOSIS — M25.512 CHRONIC LEFT SHOULDER PAIN: Primary | ICD-10-CM

## 2023-11-09 PROCEDURE — 97140 MANUAL THERAPY 1/> REGIONS: CPT | Performed by: PHYSICAL THERAPIST

## 2023-11-09 PROCEDURE — 97110 THERAPEUTIC EXERCISES: CPT | Performed by: PHYSICAL THERAPIST

## 2023-11-09 NOTE — PROGRESS NOTES
Daily Note     Today's date: 2023  Patient name: Justus Collins  : 1970  MRN: [id number]  Referring provider: Kamla Espinoza DO  Dx:   Encounter Diagnosis     ICD-10-CM    1. Chronic left shoulder pain  M25.512     G89.29                      Subjective: Reports continued discomfort with doning her jacket but overall improved. Objective: See treatment diary below      Assessment: Tolerated treatment well. Patient would benefit from continued PT      Plan: Progress treatment as tolerated.        Precautions: none      Manuals                          Left first rib and T1/2 mobs  STM to axilla and pec ND                                      Neuro Re-Ed                          Self first rib towel MWM ND            UE Dial  10x10"                                                                Ther Ex                                                                                                                     Ther Activity                                       Gait Training                                       Modalities

## 2023-11-13 ENCOUNTER — OFFICE VISIT (OUTPATIENT)
Dept: PHYSICAL THERAPY | Facility: CLINIC | Age: 53
End: 2023-11-13
Payer: COMMERCIAL

## 2023-11-13 DIAGNOSIS — G89.29 CHRONIC LEFT SHOULDER PAIN: Primary | ICD-10-CM

## 2023-11-13 DIAGNOSIS — M25.512 CHRONIC LEFT SHOULDER PAIN: Primary | ICD-10-CM

## 2023-11-13 PROCEDURE — 97140 MANUAL THERAPY 1/> REGIONS: CPT | Performed by: PHYSICAL THERAPIST

## 2023-11-13 PROCEDURE — 97110 THERAPEUTIC EXERCISES: CPT | Performed by: PHYSICAL THERAPIST

## 2023-11-13 NOTE — PROGRESS NOTES
Daily Note     Today's date: 2023  Patient name: Meghan Preston  : 1970  MRN: [id number]  Referring provider: Michael Steinberg DO  Dx:   Encounter Diagnosis     ICD-10-CM    1. Chronic left shoulder pain  M25.512     G89.29                      Subjective: Reports that she is feeling much better overall. Objective: See treatment diary below      Assessment: Tolerated treatment well. Patient would benefit from continued PT      Plan: Progress treatment as tolerated.        Precautions: none      Manuals                          Left first rib and T1/2 mobs  STM to axilla and pec ND                                      Neuro Re-Ed                          Self first rib towel MWM ND            UE Dial  10x10"                                                                Ther Ex                                                                                                                     Ther Activity                                       Gait Training                                       Modalities

## 2023-11-15 ENCOUNTER — APPOINTMENT (OUTPATIENT)
Dept: PHYSICAL THERAPY | Facility: CLINIC | Age: 53
End: 2023-11-15
Payer: COMMERCIAL

## 2023-11-20 ENCOUNTER — OFFICE VISIT (OUTPATIENT)
Dept: PHYSICAL THERAPY | Facility: CLINIC | Age: 53
End: 2023-11-20
Payer: COMMERCIAL

## 2023-11-20 DIAGNOSIS — G89.29 CHRONIC LEFT SHOULDER PAIN: Primary | ICD-10-CM

## 2023-11-20 DIAGNOSIS — M25.512 CHRONIC LEFT SHOULDER PAIN: Primary | ICD-10-CM

## 2023-11-20 PROCEDURE — 97110 THERAPEUTIC EXERCISES: CPT | Performed by: PHYSICAL THERAPIST

## 2023-11-20 PROCEDURE — 97140 MANUAL THERAPY 1/> REGIONS: CPT | Performed by: PHYSICAL THERAPIST

## 2023-11-20 NOTE — PROGRESS NOTES
Daily Note     Today's date: 2023  Patient name: Stephanie Myrick  : 1970  MRN: [id number]  Referring provider: Terri Berry DO  Dx:   Encounter Diagnosis     ICD-10-CM    1. Chronic left shoulder pain  M25.512     G89.29                      Subjective: Reports that she aggravated her shoulder and has LUE paraesthesias since she was at a party this weekend. Objective: See treatment diary below      Assessment: Tolerated treatment well. Patient would benefit from continued PT      Plan: Progress treatment as tolerated.        Precautions: none      Manuals                          Left first rib and T1/2 mobs  STM to axilla and pec ND                                      Neuro Re-Ed             Supine cervical active elongation ND            Self first rib towel MWM ND            UE Dial  10x10"                                                                Ther Ex                                                                                                                     Ther Activity                                       Gait Training                                       Modalities

## 2023-11-30 ENCOUNTER — OFFICE VISIT (OUTPATIENT)
Dept: PHYSICAL THERAPY | Facility: CLINIC | Age: 53
End: 2023-11-30
Payer: COMMERCIAL

## 2023-11-30 DIAGNOSIS — G89.29 CHRONIC LEFT SHOULDER PAIN: Primary | ICD-10-CM

## 2023-11-30 DIAGNOSIS — M25.512 CHRONIC LEFT SHOULDER PAIN: Primary | ICD-10-CM

## 2023-11-30 PROCEDURE — 97110 THERAPEUTIC EXERCISES: CPT | Performed by: PHYSICAL THERAPIST

## 2023-11-30 PROCEDURE — 97140 MANUAL THERAPY 1/> REGIONS: CPT | Performed by: PHYSICAL THERAPIST

## 2023-11-30 NOTE — PROGRESS NOTES
Daily Note     Today's date: 2023  Patient name: Sukhi Guevara  : 1970  MRN: [id number]  Referring provider: Melanie Arriaza DO  Dx:   Encounter Diagnosis     ICD-10-CM    1. Chronic left shoulder pain  M25.512     G89.29                      Subjective: Reports that she has significant improvements in ROM but continues to have lateral upper arm pain. Objective: See treatment diary below    Assessment: Tolerated treatment well. Patient would benefit from continued PT    Plan: Progress treatment as tolerated.        Precautions: none      Manuals                          Left first rib and T1/2 mobs  STM to axilla and pec ND                                      Neuro Re-Ed             Supine cervical active elongation ND, home            Self first rib towel MWM ND            UE Dial  10x10"                                                                Ther Ex                                                                                                                     Ther Activity                                       Gait Training                                       Modalities

## 2023-12-04 ENCOUNTER — OFFICE VISIT (OUTPATIENT)
Dept: PHYSICAL THERAPY | Facility: CLINIC | Age: 53
End: 2023-12-04
Payer: COMMERCIAL

## 2023-12-04 DIAGNOSIS — M25.512 CHRONIC LEFT SHOULDER PAIN: Primary | ICD-10-CM

## 2023-12-04 DIAGNOSIS — G89.29 CHRONIC LEFT SHOULDER PAIN: Primary | ICD-10-CM

## 2023-12-04 PROCEDURE — 97110 THERAPEUTIC EXERCISES: CPT | Performed by: PHYSICAL THERAPIST

## 2023-12-04 PROCEDURE — 97140 MANUAL THERAPY 1/> REGIONS: CPT | Performed by: PHYSICAL THERAPIST

## 2023-12-04 NOTE — PROGRESS NOTES
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 Current wt:  202.2  lbs. She has  gained 2.2  Lbs x just over 2 month. Feels she is eating too much throughout the day via snacking. When this is occurring does not feel in control. Acknowledges that she has not been planning. Discussed that planning is meyer to her feeling more in control with her eating. Encouraged to take some time to plan her meals/snacks and try to eat at structured times. Support and encouragement provided. She will f/u in ~6 wks. Patient seen by Medical Provider in past 6 months:  yes  Requested to schedule appointment with Medical Provider: No      Anthropometric Measurements  Start Weight (#): 198.9# (18)  Current Weight (#): 202.2#  TBW % Change from start weight: gain 1.7%  Ideal Body Weight (#):130#   Goal Weight (#):ST#       Weight Loss History  Previous weight loss attempts: Commercial Programs (Weight Watchers, [employer], etc.)  Counseling with  MD  Self Created Diets (Portion Control, Healthy Food Choices, etc.)     Food and Nutrition Related History   wake: 6:30 a.m.   Bed: 10 p.m.      Food Recall  Breakfast: 7:00: skip 3-4x/wk OR 2 eggs, tatianna's killer bread thin    Snack: skip OR will have lunch at 10:00 leftover from the night before  VVEBL:32:60:  2 slices smoked turkey, cheese, tatianna's killer bread or kieser roll, 1 tsp travis, potato chips  Snack: candy  Dinner: 6:00: salad w/egg OR lamb chops, hushpuppies, veggies OR protein, ~1 cup carb, veggies  Snack: skip OR chips       Beverages: water, coffee  Volume of beverage intake: 48 oz water, 2 cups coffee     Weekends: Same  Cravings: salty carbs  Trouble area of day:between meal snacking     Frequency of Eating out: irregularly  Food restrictions:none reported  Cooking: both  Food Shopping: both     Physical Activity Intake  Activity: walking  Frequency:  3x/wk  Physical limitations/barriers to exercise: none reported     Estimated Needs  Energy  [employer] [place] Energy Needs: BMR : 1533 calories   1-2# loss weekly sedentary:  840-1340 calories             1-2# loss weekly lightly active:3838-8887 calories  Maintenance calories for sedentary activity level: 1840 calories  Protein:70-88gm      (1.2-1.5g/kg IBW)  Fluid: 69oz     (35mL/kg IBW)     Nutrition Diagnosis  Yes; Overweight/obesity  related to Excess energy intake as evidenced by  BMI more than normative standard for age and sex (obesity-grade I 32-30. 9)     Nutrition Intervention     Nutrition Prescription  Calories:3885-2056  Protein: 100-115gm     Meal Plan (Fernandez/Pro)  Breakfast: 200, 27  Snack: 100-150, 5-10  Lunch: 350-400, 20-30  Snack: 160, 15  Dinner: 400, 30  Snack: 0-150, 0-10     Nutrition Education:    Healthy Core Manual  Calorie controlled menu  Lean protein food choices  Healthy snack options  Food journaling tips        Nutrition Counseling:  Strategies: meal planning, portion sizes, healthy snack choices, hydration, fiber intake, protein intake, exercise, food journal        Monitoring and Evaluation:  Evaluation criteria:  Energy Intake  Meet protein needs  Maintain adequate hydration  Monitor weekly weight  Meal planning/preparation  Food journal   Decreased portions at mealtimes and snacks  Physical activity      Barriers to learning:none  Readiness to change: preparation    Comprehension: good  Expected Compliance: good

## 2023-12-04 NOTE — PROGRESS NOTES
Daily Note     Today's date: 2023  Patient name: Cleve Haji  : 1970  MRN: [id number]  Referring provider: Jessie Mccallum DO  Dx:   Encounter Diagnosis     ICD-10-CM    1. Chronic left shoulder pain  M25.512     G89.29                      Subjective: Reports that she has significant improvements in ROM and pain recently. Objective: See treatment diary below    Assessment: Tolerated treatment well. Patient would benefit from continued PT    Plan: Progress treatment as tolerated.        Precautions: none      Manuals 12/                         Left first rib and T1/2 mobs  STM to axilla and pec ND                                      Neuro Re-Ed             Supine cervical active elongation ND, home            Self first rib towel MWM ND            UE Dial  10x10"                                                                Ther Ex                                                                                                                     Ther Activity                                       Gait Training                                       Modalities

## 2023-12-06 ENCOUNTER — OFFICE VISIT (OUTPATIENT)
Dept: BARIATRICS | Facility: CLINIC | Age: 53
End: 2023-12-06
Payer: COMMERCIAL

## 2023-12-06 VITALS — HEIGHT: 66 IN | BODY MASS INDEX: 32.5 KG/M2 | WEIGHT: 202.2 LBS

## 2023-12-06 DIAGNOSIS — E66.09 CLASS 1 OBESITY DUE TO EXCESS CALORIES WITH SERIOUS COMORBIDITY AND BODY MASS INDEX (BMI) OF 32.0 TO 32.9 IN ADULT: Primary | ICD-10-CM

## 2023-12-06 PROCEDURE — S9470 NUTRITIONAL COUNSELING, DIET: HCPCS

## 2023-12-06 PROCEDURE — RECHECK

## 2023-12-08 ENCOUNTER — APPOINTMENT (OUTPATIENT)
Dept: PHYSICAL THERAPY | Facility: CLINIC | Age: 53
End: 2023-12-08
Payer: COMMERCIAL

## 2023-12-12 DIAGNOSIS — E03.9 HYPOTHYROIDISM, UNSPECIFIED TYPE: ICD-10-CM

## 2023-12-12 RX ORDER — LEVOTHYROXINE SODIUM 0.05 MG/1
50 TABLET ORAL DAILY
Qty: 90 TABLET | Refills: 0 | Status: SHIPPED | OUTPATIENT
Start: 2023-12-12

## 2023-12-13 ENCOUNTER — APPOINTMENT (OUTPATIENT)
Dept: PHYSICAL THERAPY | Facility: CLINIC | Age: 53
End: 2023-12-13
Payer: COMMERCIAL

## 2023-12-14 ENCOUNTER — OFFICE VISIT (OUTPATIENT)
Dept: PHYSICAL THERAPY | Facility: CLINIC | Age: 53
End: 2023-12-14
Payer: COMMERCIAL

## 2023-12-14 DIAGNOSIS — M25.512 CHRONIC LEFT SHOULDER PAIN: Primary | ICD-10-CM

## 2023-12-14 DIAGNOSIS — G89.29 CHRONIC LEFT SHOULDER PAIN: Primary | ICD-10-CM

## 2023-12-14 PROCEDURE — 97140 MANUAL THERAPY 1/> REGIONS: CPT | Performed by: PHYSICAL THERAPIST

## 2023-12-14 PROCEDURE — 97110 THERAPEUTIC EXERCISES: CPT | Performed by: PHYSICAL THERAPIST

## 2023-12-14 NOTE — PROGRESS NOTES
Daily Note     Today's date: 2023  Patient name: Liana Farnsworth  : 1970  MRN: [id number]  Referring provider: Sujatha Hebert DO  Dx:   Encounter Diagnosis     ICD-10-CM    1. Chronic left shoulder pain  M25.512     G89.29                      Subjective: Reports that she had some pain with reaching over head to grab a roll of paper towels. Objective: See treatment diary below    Assessment: Tolerated treatment well. Patient would benefit from continued PT    Plan: Progress treatment as tolerated.        Precautions: none      Manuals                          Left first rib and T1/2 mobs  STM to axilla and pec ND                                      Neuro Re-Ed             Supine cervical active elongation ND, home            Self first rib towel MWM ND            UE Dial  10x10"                                                                Ther Ex                                                                                                                     Ther Activity                                       Gait Training                                       Modalities

## 2023-12-21 ENCOUNTER — OFFICE VISIT (OUTPATIENT)
Dept: PHYSICAL THERAPY | Facility: CLINIC | Age: 53
End: 2023-12-21
Payer: COMMERCIAL

## 2023-12-21 DIAGNOSIS — M25.512 CHRONIC LEFT SHOULDER PAIN: Primary | ICD-10-CM

## 2023-12-21 DIAGNOSIS — G89.29 CHRONIC LEFT SHOULDER PAIN: Primary | ICD-10-CM

## 2023-12-21 PROCEDURE — 97140 MANUAL THERAPY 1/> REGIONS: CPT | Performed by: PHYSICAL THERAPIST

## 2023-12-21 PROCEDURE — 97110 THERAPEUTIC EXERCISES: CPT | Performed by: PHYSICAL THERAPIST

## 2023-12-21 NOTE — PROGRESS NOTES
"Daily Note     Today's date: 2023  Patient name: Micah Moore  : 1970  MRN: [id number]  Referring provider: Filiberto Dee DO  Dx:   Encounter Diagnosis     ICD-10-CM    1. Chronic left shoulder pain  M25.512     G89.29                      Subjective: Reports that she still has some pain but her ROM is doing well    Objective: See treatment diary below    Assessment: Tolerated treatment well.     Plan: discharge to HEP     Precautions: none      Manuals                          Left first rib and T1/2 mobs  STM to axilla and pec ND                                      Neuro Re-Ed             Supine cervical active elongation ND,             Self first rib towel MWM ND            UE Dial  10x10\"                                                                Ther Ex                                                                                                                     Ther Activity                                       Gait Training                                       Modalities                                            "

## 2023-12-25 NOTE — TELEPHONE ENCOUNTER
Rx for PAP resupply faxed to [facility]  Initially BP responding to intravenous fluids, notified by nurse that BP now declining, will start levophed via PICC and consult ICU Possible spurious BP reading above - BP stabilized prior to starting levophed.  ICU evaluated Possible spurious BP reading above - BP stabilized prior to starting levophed.  ICU evaluated and recommending medicine admission.

## 2023-12-27 ENCOUNTER — TELEPHONE (OUTPATIENT)
Dept: FAMILY MEDICINE CLINIC | Facility: CLINIC | Age: 53
End: 2023-12-27

## 2023-12-27 DIAGNOSIS — G25.81 RESTLESS LEG SYNDROME: ICD-10-CM

## 2023-12-27 DIAGNOSIS — G47.33 OBSTRUCTIVE SLEEP APNEA: Primary | ICD-10-CM

## 2023-12-27 NOTE — TELEPHONE ENCOUNTER
Received a call from the patient asking for a new referral to Sleep Medicine. She called for an appointment and they told her she needs a referral for a follow up. Thanks!

## 2023-12-29 ENCOUNTER — OFFICE VISIT (OUTPATIENT)
Dept: SLEEP CENTER | Facility: CLINIC | Age: 53
End: 2023-12-29
Payer: COMMERCIAL

## 2023-12-29 VITALS
BODY MASS INDEX: 32.14 KG/M2 | SYSTOLIC BLOOD PRESSURE: 139 MMHG | HEIGHT: 66 IN | HEART RATE: 80 BPM | DIASTOLIC BLOOD PRESSURE: 63 MMHG | WEIGHT: 200 LBS

## 2023-12-29 DIAGNOSIS — G47.33 OBSTRUCTIVE SLEEP APNEA SYNDROME: Primary | ICD-10-CM

## 2023-12-29 DIAGNOSIS — F41.9 ANXIETY: ICD-10-CM

## 2023-12-29 DIAGNOSIS — I10 ESSENTIAL HYPERTENSION: ICD-10-CM

## 2023-12-29 DIAGNOSIS — Z99.89 DEPENDENCE ON CPAP VENTILATION: ICD-10-CM

## 2023-12-29 DIAGNOSIS — E66.9 OBESITY (BMI 30-39.9): ICD-10-CM

## 2023-12-29 DIAGNOSIS — G25.81 RESTLESS LEG SYNDROME: ICD-10-CM

## 2023-12-29 DIAGNOSIS — G47.50 PARASOMNIA, UNSPECIFIED TYPE: ICD-10-CM

## 2023-12-29 DIAGNOSIS — F45.8 AEROPHAGIA: ICD-10-CM

## 2023-12-29 PROCEDURE — 99214 OFFICE O/P EST MOD 30 MIN: CPT | Performed by: INTERNAL MEDICINE

## 2023-12-29 NOTE — PROGRESS NOTES
"  Follow-Up Note - Sleep Center   Micah Moore  53 y.o. female  :1970  MRN:[id number]  DOS:2023    CC: I saw this patient for follow-up in clinic today for Sleep disordered breathing, Coexisting Sleep and Medical Problems.  Interval changes: Patient received  a Dream Station Version 2 machine from [facility] over a year ago.    Results of prior studies:  A diagnostic study in 2016 demonstrated AHI of 11.7 per hour, higher during REM at 20.7 per hour and considerably higher while supine at 54 per hour.  Minimum oxygen saturation was 85 percent.  During a subsequent therapeutic study, sleep disordered breathing was adequately remediated with nasal CPAP at 11 centimeter H2O.  There were mild periodic limb movements of sleep.    PFSH, Problem List, Medications & Allergies were reviewed in EMR.   She  has a past medical history of Axenfeld-Dora syndrome, Disease of thyroid gland, Herniated nucleus pulposus, L5-S1, Hypertension, Obesity, Sleep apnea, and Visual impairment (Congenital).    She has a current medication list which includes the following prescription(s): acetaminophen, atorvastatin, carboxymethylcellulose, fluoxetine, hydrochlorothiazide, levothyroxine, potassium chloride, and loteprednol etabonate.    PHYSIOLOGICAL DATA REVIEW : Using PAP > 4 hours/night 100%. Estimated JAMEL 4.1/hour with pressure of 14.8cm H2O@90th/95th percentile; patient has not been using non FDA approved devices to sanitize the machine.  INTERPRETATION: Compliance is excellent; Pressure setting is:in acceptable range; ;   SUBJECTIVE: With respect to use of PAP, Micah  is experiencing significant adverse effects:dry mouth/throat and abdominal bloating or discomfort.She derives benefit \"and would not sleep without it \"..  Is satisfied with sleep and daytime function.   Sleep Routine: Micah reports getting 8-9 hrs sleep; she has no difficulty initiating or maintaining sleep . She arises before alarm most days " "and feels refreshed.Micah denies excessive daytime sleepiness,.  She rated herself at Total score: 5 /24 on the Constantine Sleepiness Scale.   Other issues: She is aware of jerking limb movements during sleep but not awakening her.  Continues to report sleep talking but no further episodes of sleepwalking..     Habits: Reports that she has never smoked. She has never used smokeless tobacco.,  Reports that she does not currently use alcohol.,  Reports no history of drug use., Caffeine use:limited; Exercise routine: none.      ROS: Significant for weight has been stable.  She reports no nasal, respiratory or cardiac symptoms.  Anxiety is controlled on Prozac.    EXAM: /63 (BP Location: Left arm, Patient Position: Sitting, Cuff Size: Large)   Pulse 80   Ht 5' 6\" (1.676 m)   Wt 90.7 kg (200 lb)   LMP  (LMP Unknown)   BMI 32.28 kg/m²     Wt Readings from Last 3 Encounters:   12/29/23 90.7 kg (200 lb)   12/06/23 91.7 kg (202 lb 3.2 oz)   10/27/23 90.7 kg (200 lb)      Patient is well groomed; well appearing.   CNS: Alert, orientated, speech clear & coherent  Psych: cooperative and in no distress. Mental state: Appears normal.  H&N: EOMI; NC/AT: No facial pressure marks, no rashes.    Skin/Extrem: col & hydration normal; no edema  Resp: Respiratory effort is normal  Physical findings otherwise essentially unchanged from previous.    IMPRESSION: Problem List Items & Comorbidities Addressed this Visit    1. Obstructive sleep apnea syndrome  PAP DME Pressure Change    PAP DME Resupply/Reorder      2. Dependence on CPAP ventilation        3. Restless leg syndrome        4. Aerophagia        5. Parasomnia, unspecified type        6. Anxiety        7. Obesity (BMI 30-39.9)        8. Essential hypertension            PLAN:  I reviewed results of prior studies and physiologic data with the patient.   I discussed treatment options with risks and benefits.  Treatment with  PAP is medically necessary and Micah " "is agreable to continue use.   Care of equipment, methods to improve comfort using PAP and importance of compliance with therapy were discussed.  Pressure setting: continue 11-15 cmH2O. increase EPR to level 3.  She may also benefit from V-com,  Rx provided to replace supplies and Care coordinated with DME provider.   If periodic limb movements of sleep increase, consideration to be given to bupropion in place of fluoxetine.  Discussed strategies for weight reduction and continue safety precautions with respect to parasomnia activity..    Follow-up is advised in 1 year or sooner if needed to monitor progress, compliance and to adjust therapy.     Thank you for allowing me to participate in the care of this patient.    Sincerely,     Authenticated electronically on 12/29/23   Board Certified Specialist     Portions of the record may have been created with voice recognition software. Occasional wrong word or \"sound a like\" substitutions may have occurred due to the inherent limitations of voice recognition software. There may also be notations and random deletions of words or characters from malfunctioning software. Read the chart carefully and recognize, using context, where substitutions/deletions have occurred.    "

## 2023-12-29 NOTE — PATIENT INSTRUCTIONS
V-com    Nursing Support:  When: Monday through Friday 7A-5PM except holidays  Where: Our direct line is [phone].    If you are having a true emergency please call 911.  In the event that the line is busy or it is after hours please leave a voice message and we will return your call.  Please speak clearly, leaving your full name, birth date, best number to reach you and the reason for your call.   Medication refills: We will need the name of the medication, the dosage, the ordering provider, whether you get a 30 or 90 day refill, and the pharmacy name and address.  Medications will be ordered by the provider only.  Nurses cannot call in prescriptions.  Please allow 7 days for medication refills.  Physician requested updates: If your provider requested that you call with an update after starting medication, please be ready to provide us the medication and dosage, what time you take your medication, the time you attempt to fall asleep, time you fall asleep, when you wake up, and what time you get out of bed.  Sleep Study Results: We will contact you with sleep study results and/or next steps after the physician has reviewed your testing.

## 2024-01-02 ENCOUNTER — TELEPHONE (OUTPATIENT)
Dept: SLEEP CENTER | Facility: CLINIC | Age: 54
End: 2024-01-02

## 2024-01-03 LAB

## 2024-02-06 DIAGNOSIS — I10 ESSENTIAL HYPERTENSION: ICD-10-CM

## 2024-02-06 RX ORDER — HYDROCHLOROTHIAZIDE 50 MG/1
50 TABLET ORAL DAILY
Qty: 90 TABLET | Refills: 1 | Status: SHIPPED | OUTPATIENT
Start: 2024-02-06

## 2024-02-08 ENCOUNTER — TELEPHONE (OUTPATIENT)
Dept: INFECTIOUS DISEASES | Facility: CLINIC | Age: 54
End: 2024-02-08

## 2024-02-09 ENCOUNTER — TELEPHONE (OUTPATIENT)
Dept: NEPHROLOGY | Facility: CLINIC | Age: 54
End: 2024-02-09

## 2024-02-09 NOTE — TELEPHONE ENCOUNTER
New Patient Intake Form   Patient Details   Micah Moore     1970     [id number]     Insurance Information   Name of Insurance Company [facility]    Does the patient need an insurance referral? yes   If patient has VA insurance, please ask if they will be using their VA insurance.   Appointment Information   Who is calling to schedule?  If not patient, what is callers name? Patient   Referring Provider     Reason for Appt (Diagnosis) Hypokalemia    Does Patient have labs/urine done at [facility]?  If not, where do they go?  List the date of last lab / urine  *Please try to get labs 2 years back if not at  Yes/ [facility] only   Has patient been hospitalized recently?  If yes, list name and location of hospital they were in no   Has patient been seen by a Nephrologist before?  If yes, list name, location and phone number no   Has the patient had renal imaging done?  If so, list the most recent date and type of imaging no    Does patient have a history of Kidney Stones? no   Appointment Details   Is there a referral on file? yes    Appointment Date 4/10     [facility]   Miscellaneous

## 2024-03-20 DIAGNOSIS — F41.1 GENERALIZED ANXIETY DISORDER: ICD-10-CM

## 2024-03-20 DIAGNOSIS — E03.9 HYPOTHYROIDISM, UNSPECIFIED TYPE: ICD-10-CM

## 2024-03-20 RX ORDER — LEVOTHYROXINE SODIUM 0.05 MG/1
50 TABLET ORAL DAILY
Qty: 30 TABLET | Refills: 0 | Status: SHIPPED | OUTPATIENT
Start: 2024-03-20

## 2024-03-20 RX ORDER — FLUOXETINE HYDROCHLORIDE 20 MG/1
20 CAPSULE ORAL DAILY
Qty: 90 CAPSULE | Refills: 1 | Status: SHIPPED | OUTPATIENT
Start: 2024-03-20

## 2024-03-25 DIAGNOSIS — Z00.6 ENCOUNTER FOR EXAMINATION FOR NORMAL COMPARISON OR CONTROL IN CLINICAL RESEARCH PROGRAM: ICD-10-CM

## 2024-04-03 ENCOUNTER — TELEPHONE (OUTPATIENT)
Dept: NEPHROLOGY | Facility: CLINIC | Age: 54
End: 2024-04-03

## 2024-04-03 DIAGNOSIS — I10 ESSENTIAL HYPERTENSION: ICD-10-CM

## 2024-04-03 DIAGNOSIS — E87.6 HYPOKALEMIA: Primary | ICD-10-CM

## 2024-04-08 ENCOUNTER — APPOINTMENT (OUTPATIENT)
Dept: LAB | Facility: MEDICAL CENTER | Age: 54
End: 2024-04-08
Payer: COMMERCIAL

## 2024-04-08 ENCOUNTER — APPOINTMENT (OUTPATIENT)
Dept: LAB | Facility: MEDICAL CENTER | Age: 54
End: 2024-04-08

## 2024-04-08 DIAGNOSIS — Z00.6 ENCOUNTER FOR EXAMINATION FOR NORMAL COMPARISON OR CONTROL IN CLINICAL RESEARCH PROGRAM: ICD-10-CM

## 2024-04-08 DIAGNOSIS — Z00.8 HEALTH EXAMINATION IN POPULATION SURVEY: ICD-10-CM

## 2024-04-08 DIAGNOSIS — E03.9 HYPOTHYROIDISM, UNSPECIFIED TYPE: ICD-10-CM

## 2024-04-08 DIAGNOSIS — E87.6 HYPOKALEMIA: ICD-10-CM

## 2024-04-08 LAB
ANION GAP SERPL CALCULATED.3IONS-SCNC: 10 MMOL/L (ref 4–13)
BUN SERPL-MCNC: 19 MG/DL (ref 5–25)
CALCIUM SERPL-MCNC: 8.9 MG/DL (ref 8.4–10.2)
CHLORIDE SERPL-SCNC: 98 MMOL/L (ref 96–108)
CHOLEST SERPL-MCNC: 182 MG/DL
CO2 SERPL-SCNC: 31 MMOL/L (ref 21–32)
CREAT SERPL-MCNC: 0.83 MG/DL (ref 0.6–1.3)
EST. AVERAGE GLUCOSE BLD GHB EST-MCNC: 128 MG/DL
GFR SERPL CREATININE-BSD FRML MDRD: 80 ML/MIN/1.73SQ M
GLUCOSE P FAST SERPL-MCNC: 120 MG/DL (ref 65–99)
HBA1C MFR BLD: 6.1 %
HDLC SERPL-MCNC: 34 MG/DL
LDLC SERPL CALC-MCNC: 107 MG/DL (ref 0–100)
NONHDLC SERPL-MCNC: 148 MG/DL
POTASSIUM SERPL-SCNC: 3.3 MMOL/L (ref 3.5–5.3)
SODIUM SERPL-SCNC: 139 MMOL/L (ref 135–147)
TRIGL SERPL-MCNC: 205 MG/DL
TSH SERPL DL<=0.05 MIU/L-ACNC: 4.41 UIU/ML (ref 0.45–4.5)

## 2024-04-08 PROCEDURE — 84443 ASSAY THYROID STIM HORMONE: CPT

## 2024-04-08 PROCEDURE — 83036 HEMOGLOBIN GLYCOSYLATED A1C: CPT

## 2024-04-08 PROCEDURE — 80061 LIPID PANEL: CPT

## 2024-04-08 PROCEDURE — 80048 BASIC METABOLIC PNL TOTAL CA: CPT

## 2024-04-08 PROCEDURE — 36415 COLL VENOUS BLD VENIPUNCTURE: CPT

## 2024-04-08 NOTE — TELEPHONE ENCOUNTER
Called patient to let her know that she does not need to drawn any labs before her appointment on 4/10 because her appointment is   CONSULT and for all consult appt patients do no need to do any labs before appt. Patient was upset because she said that our office call her ans asked to have blood work drawn before her appt. I apologize to patient for the phone call she received form our office. No further questions at this time.

## 2024-04-08 NOTE — TELEPHONE ENCOUNTER
Patient called, said that she went to the lab to have her blood work, but there was nothing ordered by nephrology-they told her those orders were cancelled.  Please advise if patient needs any additional blood work.

## 2024-04-10 ENCOUNTER — CONSULT (OUTPATIENT)
Dept: NEPHROLOGY | Facility: CLINIC | Age: 54
End: 2024-04-10

## 2024-04-10 VITALS
HEART RATE: 88 BPM | SYSTOLIC BLOOD PRESSURE: 130 MMHG | WEIGHT: 201 LBS | HEIGHT: 66 IN | DIASTOLIC BLOOD PRESSURE: 74 MMHG | BODY MASS INDEX: 32.3 KG/M2 | OXYGEN SATURATION: 97 %

## 2024-04-10 DIAGNOSIS — E87.6 HYPOKALEMIA: ICD-10-CM

## 2024-04-10 DIAGNOSIS — I10 ESSENTIAL HYPERTENSION: Primary | ICD-10-CM

## 2024-04-10 RX ORDER — HYDROCHLOROTHIAZIDE 25 MG/1
25 TABLET ORAL DAILY
Qty: 90 TABLET | Refills: 3 | Status: SHIPPED | OUTPATIENT
Start: 2024-04-10

## 2024-04-10 RX ORDER — LISINOPRIL 5 MG/1
5 TABLET ORAL DAILY
Qty: 30 TABLET | Refills: 5 | Status: SHIPPED | OUTPATIENT
Start: 2024-04-10

## 2024-04-10 NOTE — PATIENT INSTRUCTIONS
Potassium Content of Foods List   WHAT YOU NEED TO KNOW:   What is potassium?  Potassium is a mineral that is found in most foods. Potassium helps to balance fluids and minerals in your body. It also helps your body maintain a normal blood pressure. Potassium helps your muscles contract and your nerves function normally.  Why do I need to change the amount of potassium I eat?   You may need more potassium  if you have hypokalemia (low potassium levels) or high blood pressure. You may also need more potassium if you are taking diuretics. Diuretics and certain medicines cause your body to lose potassium.    You may need less potassium  in your diet if you have hyperkalemia (high potassium levels) or kidney disease.    How much potassium does fruit contain?  The amount of potassium in milligrams (mg) contained in each fruit or serving of fruit is listed beside the item.  High-potassium foods (more than 200 mg per serving):      1 medium banana (425)    ½ of a papaya (390)    ½ cup of prune juice (370)    ¼ cup of raisins (270)    1 medium nicole (325) or kiwi (240)    1 small orange (240) or ½ cup of orange juice (235)    ½ cup of cubed cantaloupe (215) or diced honeydew melon (200)    1 medium pear (200)    Medium-potassium foods (50 to 200 mg per serving):      1 medium peach (185)    1 small apple or ½ cup of apple juice (150)    ½ cup of peaches canned in juice (120)    ½ cup of canned pineapple (100)    ½ cup of fresh, sliced strawberries (125)    ½ cup of watermelon (85)    Low-potassium foods (less than 50 mg per serving):      ½ cup of cranberries (45) or cranberry juice cocktail (20)    ½ cup of nectar of papaya, nicole, or pear (35)    How much potassium do vegetables contain?   High-potassium foods (more than 200 mg per serving):      1 medium baked potato, with skin (925)    1 baked medium sweet potato, with skin (450)    ½ cup of tomato or vegetable juice (275), or 1 medium raw tomato (290)    ½ cup of  mushrooms (280)    ½ cup of fresh brussels sprouts (250)    ½ cup of cooked zucchini (220) or winter squash (250)    ¼ of a medium avocado (245)    ½ cup of broccoli (230)    Medium-potassium foods (50 to 200 mg per serving):      ½ cup of corn (195)    ½ cup of fresh or cooked carrots (180)    ½ cup of fresh cauliflower (150)    ½ cup of asparagus (155)    ½ cup of canned peas (90)     1 cup of lettuce, all types (100)    ½ cup of fresh green beans (90)    ½ cup of frozen green beans (85)    ½ cup of cucumber (80)    How much potassium do protein foods contain?   High-potassium foods (more than 200 mg per serving):      ½ cup of cooked ahumada beans (400) or lentils (365)    1 cup of soy milk (300)    3 ounces of baked or broiled salmon (319)    3 ounces of roasted turkey, dark meat (250)    ¼ cup of sunflower seeds (241)    3 ounces of cooked lean beef (224)    2 tablespoons of smooth peanut butter (210)    Medium-potassium foods (50 to 200 mg per serving):      1 ounce of salted peanuts, almonds, or cashews (200)    1 large egg (60)    How much potassium do dairy foods contain?   High-potassium foods (more than 200 mg per serving):      6 ounces of yogurt (260 to 435)    1 cup of nonfat, low-fat, or whole milk (350 to 380)    Medium-potassium foods (50 to 200 mg per serving):      ½ cup of ricotta cheese (154)    ½ cup of vanilla ice cream (131)    ½ cup of low-fat (2%) cottage cheese (110)    Low-potassium foods (less than 50 mg per serving):      1 ounce of cheese (20 to 30)      How much potassium do grains contain?   1 slice of white bread (30)    ½ cup of white or brown rice (50)    ½ cup of spaghetti or macaroni (30)    1 flour or corn tortilla (50)    1 four-inch waffle (50)    What other foods contain potassium?   1 tablespoon of molasses (295)    1½ ounces of chocolate (165)    Some salt substitutes may contain a high amount of potassium. Check the food label to find the amount of potassium it  contains.    CARE AGREEMENT:   You have the right to help plan your care. Discuss treatment options with your healthcare provider to decide what care you want to receive. You always have the right to refuse treatment. The above information is an  only. It is not intended as medical advice for individual conditions or treatments. Talk to your doctor, nurse or pharmacist before following any medical regimen to see if it is safe and effective for you.  © Copyright [employer] 2023 Information is for End User's use only and may not be sold, redistributed or otherwise used for commercial purposes.

## 2024-04-10 NOTE — PROGRESS NOTES
NEPHROLOGY OUTPATIENT CONSULTATION   Micah Moore 53 y.o. female MRN: [id number]  Date: 4/10/2024  Reason for consultation:   Chief Complaint   Patient presents with    Consult    Hypokalemia     ASSESSMENT AND PLAN:  Hypokalemia  -Chronic persistent hypokalemia issues noted since early 2023 with most recent potassium still remains low at 3.3 on 4/8/2024  -HCTZ can contribute to hypokalemia.  -Given hypertension and hypokalemia, will evaluate for primary hyperaldosteronism, check serum aldosterone, PRA  -Decrease HCTZ from 50 mg to 25 mg daily.  -Recommend to increase potassium supplement from 20 meq daily to twice daily dosing for next 3 days and then continue 20 mg daily afterwards  -Check serum magnesium  -Depending on above workup, may consider eventually urine potassium/creatinine ratio to evaluate renal loss of potassium once patient is able to come off HCTZ  -Potassium liberal diet recommended.    Hypertension  -Blood pressure overall acceptable in the office today.  -Currently on HCTZ 50 mg daily, will reduce this to 25 mg daily, start lisinopril 5 mg daily  -Repeat BMP in 2 weeks  -She has upper arm BP machine at home although does not check BP on regular basis.  Advised to check BP on daily basis and call back if remains persistently less than 110/60 or greater than 135/85  -Salt restricted diet recommended  -She admits to be taking Advil/naproxen about 3 to 4 tablets/week.  Recommended to avoid any NSAIDs in the future.    Renal function stable with serum creatinine 0.8  -Check UA with microscopy, UACR, BMP in couple weeks    HISTORY OF PRESENT ILLNESS:  Micah Moore is a 53 y.o. year old female with medical issues of hypertension for 10 years, hyperlipidemia, hypothyroidism, sleep apnea on CPAP, restless leg syndrome, who presents for initial consultation for hypertension, hypokalemia.  Old medical records including prior lab values were reviewed from [facility] records.  Patient has been on HCTZ  "for many years.  She was found to have chronic persistent hypokalemia issues since early 2023 and since then remains on potassium supplements.  Most recent serum potassium still remains below goal.  She denies any urinary complaint.  Denies any nausea, vomiting, diarrhea or GI loss history.  She does not check BP on regular basis at home.  BP acceptable in the office today.  Also had some leg edema issues in the past although this seems to have much improved and overall stable currently.  Denies smoking.  Admits to be taking Advil/naproxen 3 to 4 tablets/week.    Family history includes father and mother both having hypertension.    REVIEW OF SYSTEMS:    More than 10 point review of systems were obtained and discussed in length with the patient. Complete review of systems were negative / unremarkable except mentioned in the HPI section.      PHYSICAL EXAM:  Vitals:    04/10/24 1448 04/10/24 1537   BP: 120/72 130/74   BP Location: Left arm    Patient Position: Sitting    Cuff Size: Adult    Pulse: 88    SpO2: 97%    Weight: 91.2 kg (201 lb)    Height: 5' 6\" (1.676 m)      Body mass index is 32.44 kg/m².    Physical Exam  Vitals reviewed.   Constitutional:       Appearance: She is well-developed.   HENT:      Head: Normocephalic and atraumatic.      Right Ear: External ear normal.      Left Ear: External ear normal.   Eyes:      Conjunctiva/sclera: Conjunctivae normal.   Cardiovascular:      Comments: No significant edema in legs  Pulmonary:      Effort: Pulmonary effort is normal.      Breath sounds: Normal breath sounds. No wheezing or rales.   Abdominal:      General: Bowel sounds are normal. There is no distension.      Palpations: Abdomen is soft.      Tenderness: There is no abdominal tenderness.   Musculoskeletal:         General: No deformity.   Lymphadenopathy:      Cervical: No cervical adenopathy.   Skin:     Findings: No rash.   Neurological:      Mental Status: She is alert and oriented to person, place, " and time.   Psychiatric:         Behavior: Behavior normal.         PAST MEDICAL HISTORY:  Past Medical History:   Diagnosis Date    Axenfeld-Dora syndrome     Disease of thyroid gland     Herniated nucleus pulposus, L5-S1     last assesed [id number]    Hypertension     Obesity     Sleep apnea     Visual impairment Congenital    Axenfeld Riegers Anomaly       PAST SURGICAL HISTORY:  Past Surgical History:   Procedure Laterality Date    CATARACT EXTRACTION, BILATERAL Bilateral     CLOSED REDUCTION ELBOW DISLOCATION      CORNEAL TRANSPLANT      EYE SURGERY  , ,     Bilat cataracts , cornea tranplant     WISDOM TOOTH EXTRACTION         ALLERGIES:  Allergies   Allergen Reactions    Sulfa Antibiotics Rash       SOCIAL HISTORY:  Social History     Substance and Sexual Activity   Alcohol Use Not Currently    Comment: socially     Social History     Substance and Sexual Activity   Drug Use No     Social History     Tobacco Use   Smoking Status Never   Smokeless Tobacco Never       FAMILY HISTORY:  Family History   Problem Relation Age of Onset    Atrial fibrillation Mother     Stroke Mother     Hypertension Mother     Supraventricular tachycardia Mother     Rheum arthritis Mother     Autoimmune disease Mother         RA    Heart attack Father     Heart disease Father         , MI age 56    Hypertension Father     No Known Problems Sister     Thyroid disease Daughter     No Known Problems Sister     No Known Problems Daughter     No Known Problems Paternal Aunt     No Known Problems Paternal Aunt     No Known Problems Paternal Aunt     No Known Problems Paternal Aunt     No Known Problems Paternal Aunt     Cancer Neg Hx     Diabetes Neg Hx        MEDICATIONS:    Current Outpatient Medications:     acetaminophen (TYLENOL) 325 mg tablet, Take by mouth as needed , Disp: , Rfl:     atorvastatin (LIPITOR) 10 mg tablet, Take 1 tablet (10 mg total) by mouth daily, Disp: 90 tablet, Rfl: 3     "carboxymethylcellulose (REFRESH PLUS) 0.5 % SOLN, Apply to eye, Disp: , Rfl:     FLUoxetine (PROzac) 20 mg capsule, TAKE ONE CAPSULE BY MOUTH EVERY DAY, Disp: 90 capsule, Rfl: 1    hydroCHLOROthiazide 25 mg tablet, Take 1 tablet (25 mg total) by mouth daily, Disp: 90 tablet, Rfl: 3    levothyroxine 50 mcg tablet, TAKE ONE TABLET BY MOUTH EVERY DAY, Disp: 30 tablet, Rfl: 0    lisinopril (ZESTRIL) 5 mg tablet, Take 1 tablet (5 mg total) by mouth daily, Disp: 30 tablet, Rfl: 5    potassium chloride (K-DUR,KLOR-CON) 20 mEq tablet, Take 1 tablet (20 mEq total) by mouth daily, Disp: 90 tablet, Rfl: 3    loteprednol etabonate (LOTEMAX) 0.5 % ophth gel, Administer 1 drop to the right eye Every other day, Disp: , Rfl:     Lab Results:   Serum potassium 3.3, creatinine 0.8    Portions of the record may have been created with voice recognition software. Occasional wrong word or \"sound a like\" substitutions may have occurred due to the inherent limitations of voice recognition software. Read the chart carefully and recognize, using context, where substitutions have occurred.  "

## 2024-04-28 LAB
APOB+LDLR+PCSK9 GENE MUT ANL BLD/T: NOT DETECTED
BRCA1+BRCA2 DEL+DUP + FULL MUT ANL BLD/T: NOT DETECTED
MLH1+MSH2+MSH6+PMS2 GN DEL+DUP+FUL M: NOT DETECTED

## 2024-05-14 ENCOUNTER — OFFICE VISIT (OUTPATIENT)
Dept: FAMILY MEDICINE CLINIC | Facility: CLINIC | Age: 54
End: 2024-05-14
Payer: COMMERCIAL

## 2024-05-14 VITALS
DIASTOLIC BLOOD PRESSURE: 54 MMHG | HEIGHT: 66 IN | HEART RATE: 89 BPM | OXYGEN SATURATION: 98 % | WEIGHT: 201.8 LBS | TEMPERATURE: 98.1 F | BODY MASS INDEX: 32.43 KG/M2 | SYSTOLIC BLOOD PRESSURE: 132 MMHG

## 2024-05-14 DIAGNOSIS — S20.364A TICK BITE OF MIDDLE FRONT WALL OF THORAX, INITIAL ENCOUNTER: ICD-10-CM

## 2024-05-14 DIAGNOSIS — L03.90 CELLULITIS OF SKIN: Primary | ICD-10-CM
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
## 2024-05-14 PROCEDURE — 99213 OFFICE O/P EST LOW 20 MIN: CPT | Performed by: FAMILY MEDICINE

## 2024-05-14 RX ORDER — DOXYCYCLINE HYCLATE 100 MG
100 TABLET ORAL 2 TIMES DAILY
Qty: 28 TABLET | Refills: 0 | Status: SHIPPED | OUTPATIENT
Start: 2024-05-14 | End: 2024-05-28

## 2024-05-14 NOTE — PROGRESS NOTES
"Assessment/Plan: Side effect profile of medication reviewed.  Recommend return to office if any intolerance to medication.     1. Cellulitis of skin  -     doxycycline hyclate (VIBRA-TABS) 100 mg tablet; Take 1 tablet (100 mg total) by mouth 2 (two) times a day for 14 days    2. Tick bite of middle front wall of thorax, initial encounter          Subjective:      Patient ID: Micah Moore is a 53 y.o. female.    Patient had tick bite 2 days ago.   pulled tick off.  She denies any fevers or rashes.             The following portions of the patient's history were reviewed and updated as appropriate: allergies, current medications, past family history, past medical history, past social history, past surgical history, and problem list.    Review of Systems   Constitutional: Negative.    HENT: Negative.     Eyes: Negative.    Respiratory: Negative.     Cardiovascular: Negative.    Gastrointestinal: Negative.    Endocrine: Negative.    Genitourinary: Negative.    Musculoskeletal: Negative.    Skin:  Positive for rash.   Allergic/Immunologic: Negative.    Neurological: Negative.    Hematological: Negative.    Psychiatric/Behavioral: Negative.           Objective:      /54 (BP Location: Left arm, Patient Position: Sitting)   Pulse 89   Temp 98.1 °F (36.7 °C) (Tympanic)   Ht 5' 6\" (1.676 m)   Wt 91.5 kg (201 lb 12.8 oz)   LMP  (LMP Unknown)   SpO2 98%   BMI 32.57 kg/m²          Physical Exam  Vitals reviewed.   Constitutional:       Appearance: She is well-developed.   HENT:      Head: Normocephalic and atraumatic.      Right Ear: External ear normal. Tympanic membrane is not erythematous or bulging.      Left Ear: External ear normal. Tympanic membrane is not erythematous or bulging.      Nose: Nose normal.      Mouth/Throat:      Mouth: No oral lesions.      Pharynx: No oropharyngeal exudate.   Eyes:      General: No scleral icterus.        Right eye: No discharge.         Left eye: No discharge.     "  Conjunctiva/sclera: Conjunctivae normal.   Neck:      Thyroid: No thyromegaly.   Cardiovascular:      Rate and Rhythm: Normal rate and regular rhythm.      Heart sounds: Normal heart sounds. No murmur heard.     No friction rub. No gallop.   Pulmonary:      Effort: Pulmonary effort is normal. No respiratory distress.      Breath sounds: No wheezing or rales.   Chest:      Chest wall: No tenderness.   Abdominal:      General: Bowel sounds are normal. There is no distension.      Palpations: Abdomen is soft. There is no mass.      Tenderness: There is no abdominal tenderness. There is no guarding or rebound.   Musculoskeletal:         General: No tenderness or deformity. Normal range of motion.      Cervical back: Normal range of motion and neck supple.   Lymphadenopathy:      Cervical: No cervical adenopathy.   Skin:     General: Skin is warm and dry.      Coloration: Skin is not pale.      Findings: Rash (0.3 cm area of erythema to the mid chest from tick bite) present. No erythema.   Neurological:      Mental Status: She is alert and oriented to person, place, and time.      Cranial Nerves: No cranial nerve deficit.      Motor: No abnormal muscle tone.      Coordination: Coordination normal.      Deep Tendon Reflexes: Reflexes are normal and symmetric.   Psychiatric:         Behavior: Behavior normal.

## 2024-06-20 DIAGNOSIS — E03.9 HYPOTHYROIDISM, UNSPECIFIED TYPE: ICD-10-CM

## 2024-06-20 RX ORDER — LEVOTHYROXINE SODIUM 0.05 MG/1
50 TABLET ORAL DAILY
Qty: 90 TABLET | Refills: 1 | Status: SHIPPED | OUTPATIENT
Start: 2024-06-20

## 2024-08-10 DIAGNOSIS — E87.6 HYPOKALEMIA: ICD-10-CM

## 2024-08-12 RX ORDER — POTASSIUM CHLORIDE 1500 MG/1
20 TABLET, EXTENDED RELEASE ORAL DAILY
Qty: 90 TABLET | Refills: 0 | Status: SHIPPED | OUTPATIENT
Start: 2024-08-12

## 2024-08-28 ENCOUNTER — OFFICE VISIT (OUTPATIENT)
Dept: DERMATOLOGY | Facility: CLINIC | Age: 54
End: 2024-08-28
Payer: COMMERCIAL

## 2024-08-28 VITALS — HEIGHT: 66 IN | WEIGHT: 202 LBS | BODY MASS INDEX: 32.47 KG/M2 | TEMPERATURE: 96.6 F

## 2024-08-28 DIAGNOSIS — L82.1 SEBORRHEIC KERATOSIS: Primary | ICD-10-CM

## 2024-08-28 DIAGNOSIS — L85.8 KERATOSIS PILARIS: ICD-10-CM

## 2024-08-28 DIAGNOSIS — L81.4 LENTIGINES: ICD-10-CM

## 2024-08-28 DIAGNOSIS — D18.01 CHERRY ANGIOMA: ICD-10-CM

## 2024-08-28 DIAGNOSIS — D22.9 MULTIPLE BENIGN MELANOCYTIC NEVI: ICD-10-CM

## 2024-08-28 DIAGNOSIS — L20.89 OTHER ATOPIC DERMATITIS: ICD-10-CM

## 2024-08-28 DIAGNOSIS — L91.8 SKIN TAG: ICD-10-CM

## 2024-08-28 PROCEDURE — 99203 OFFICE O/P NEW LOW 30 MIN: CPT

## 2024-08-28 NOTE — PROGRESS NOTES
"[facility] Note     Patient Name: Micah Moore  Encounter Date: 8/28/24     Have you been cared for by a [facility]ologist in the last 3 years and, if so, which description applies to you?    NO.   I am considered a \"new\" patient and must complete all patient intake questions. I am FEMALE/of child-bearing potential.    REVIEW OF SYSTEMS:  Have you recently had or currently have any of the following? Recent fever or chills? No  Any non-healing wound? No  Are you pregnant or planning to become pregnant? No  Are you currently or planning to be nursing or breast feeding? No   PAST MEDICAL HISTORY:  Have you personally ever had or currently have any of the following?  If \"YES,\" then please provide more detail. Skin cancer (such as Melanoma, Basal Cell Carcinoma, Squamous Cell Carcinoma?  No  Tuberculosis, HIV/AIDS, Hepatitis B or C: No  Radiation Treatment No   HISTORY OF IMMUNOSUPPRESSION:   Do you have a history of any of the following:  Systemic Immunosuppression such as Diabetes, Biologic or Immunotherapy, Chemotherapy, Organ Transplantation, Bone Marrow Transplantation or Prednsione? 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   FAMILY HISTORY:  Any \"first degree relatives\" (parent, brother, sister, or child) with the following?    Skin Cancer, Pancreatic or Other Cancer? YES, Mother SCC    PATIENT EXPERIENCE:    Do you want the Dermatologist to perform a COMPLETE skin exam today including a clinical examination under the \"bra and underwear\" areas?  Yes  If necessary, do we have your permission to call and leave a detailed message on your Preferred Phone number that includes your specific medical information?  Yes      Allergies   Allergen Reactions    Sulfa Antibiotics Rash      Current Outpatient Medications:     acetaminophen (TYLENOL) 325 mg tablet, Take by mouth as needed " , Disp: , Rfl:     atorvastatin (LIPITOR) 10 mg tablet, Take 1 tablet (10 mg total) by mouth daily, Disp: 90 tablet, Rfl: 3    carboxymethylcellulose (REFRESH PLUS) 0.5 % SOLN, Apply to eye, Disp: , Rfl:     FLUoxetine (PROzac) 20 mg capsule, TAKE ONE CAPSULE BY MOUTH EVERY DAY, Disp: 90 capsule, Rfl: 1    hydroCHLOROthiazide 25 mg tablet, Take 1 tablet (25 mg total) by mouth daily, Disp: 90 tablet, Rfl: 3    levothyroxine 50 mcg tablet, TAKE ONE TABLET BY MOUTH EVERY DAY, Disp: 90 tablet, Rfl: 1    lisinopril (ZESTRIL) 5 mg tablet, Take 1 tablet (5 mg total) by mouth daily, Disp: 30 tablet, Rfl: 5    loteprednol etabonate (LOTEMAX) 0.5 % ophth gel, Administer 1 drop to the right eye Every other day, Disp: , Rfl:     potassium chloride (Klor-Con M20) 20 mEq tablet, TAKE ONE TABLET BY MOUTH EVERY DAY, Disp: 90 tablet, Rfl: 0          Whom besides the patient is providing clinical information about today's encounter?   NO ADDITIONAL HISTORIAN (patient alone provided history)    Physical Exam and Assessment/Plan by Diagnosis:    Patient here for a skin check and noticed something on her back. Mother did have a skin cancer which was a Squamous cell carcinoma.    SEBORRHEIC KERATOSES  - Relevant exam: Scattered over the trunk/extremities are waxy brown to black plaques and papules with stuck on appearance and consistent dermoscopy  - Exam and clinical history consistent with seborrheic keratoses  - Counseled that these are benign growths that do not require treatment    MELANOCYTIC NEVI  -Relevant exam: Scattered over the trunk/extremities are homogenously pigmented brown macules and papules. ELM performed and without concerning findings. No outliers unless otherwise noted in today's note  - Exam and clinical history consistent with melanocytic nevi  - Counseled to return to clinic prior to scheduled appointment should any of these lesions change or should any new lesions of concern arise  - Counseled on use of sun  "protection daily. Reviewed latest FDA sunscreen guidelines, including use of broad spectrum (UVA and UVB blocking) sunscreen or sun protective clothing with SPF 30-50 every 2-3 hours and reapplied after exposure to water    LENTIGINES  OTHER SKIN CHANGES DUE TO CHRONIC EXPOSURE TO NONIONIZING RADIATION  - Relevant exam: Over sun exposed areas are brown macules. ELM performed and without concerning findings.  - Exam and clinical history consistent with lentigines.  - Counseled to return to clinic prior to scheduled appointment should any of these lesions change or should any new lesions of concern arise.  - Recommended use of sunscreen as above and below.    CHERRY ANGIOMAS  - Relevant exam: Scattered over the trunk/extremities are red papules  - Exam and clinical history consistent with cherry angiomas  - Educated that these are benign    ACROCHORDON (\"SKIN TAG\")    Physical Exam:  Anatomic Location Affected:  left armpit   Morphological Description: skin colored papules      Assessment & Plan:   -reassured benign      KERATOSIS PILARIS    Physical Exam:  Anatomic Location Affected:  stomach, arms, legs  Morphological Description:  1-2mm constance-follicular pinkish-red papules   Pertinent Positives:  Pertinent Negatives:      Assessment and Plan:  Based on a thorough discussion of this condition and the management approach to it (including a comprehensive discussion of the known risks, side effects and potential benefits of treatment), the patient (family) agrees to implement the following specific plan:  Recommend using moisturizer with gentle acid such as lactic acid or salicylic acid                            Advise gentle skin care as follows:   Shower with lukewarm water less than 10 minutes   Use Dove unscented soap to groin and armpits and neck  Pat dry after shower. Do not harshly rub.   Immediately moisturize with heavy emollient   BEST - OINTMENTS, such as Vaseline, Aquaphor, Cerave healing ointment "      BETTER - CREAMS, such as Cerave, Cetaphil, VaniCREAM, Aveeno, Eucerin  AVOID LOTIONS, too thin, most things in pump  Moisturize at least once daily, more often if you can. Can mix moisturizers with the gentle acid creams above or use on top of the acid creams above.       Keratosis pilaris is a very common condition that appears as tiny bumps on the skin that may look like goosebumps or small pimples. These bumps are composed of small plugs of dead skin cells and are most commonly found over the upper arms and thighs. Children may also find bumps on their cheeks. Keratosis pilaris is harmless and affects up to half of normal children and up to three quarters of children who have ichthyosis vulgaris (a dry skin condition due to filaggrin gene mutations). It is also more common in children with atopic eczema.     Common symptoms of keratosis pilaris begin before age 2 or during the teenage years.    Bumps over the outer aspect of upper arms and thighs symmetrically that feel like sandpaper  Potentially over buttocks, sides of cheeks, forearms, and upper back  Scaly, skin colored or red spots in keratosis pilaris rubra  Non-painful, but potential to be itchy     Keratosis pilaris is caused by abnormal growth of skin cells lining the upper portion of the hair follicles. Instead of naturally sloughing off, scaly skin will pile up and fill the follicle. There is a strong association with genetics, meaning that the condition has a high chance of being inherited if one or both parents are affected. It can also occur as a side effect of cancer therapies such as vemurafenib.     Treating dry skin often helps as dry skin can cause the bumps to be more noticeable. Many people notice that the bumps disappear in the summer months when there is more moisture in the air. Sometimes, keratosis pilaris fades as one grows older, but puberty and hormonal therapy can cause flare-ups. If itch, dryness, or appearance bother you,  treatment options include:  Using non-soap cleansers to minimize dryness of the skin   Exfoliation in the shower using a pumice stone or exfoliating sponge  Ammonium lactate cream or lotion (12%)   A moisturizing cream or ointment applied at least 2-3x a day and after bathing   Creams containing urea or lactic acid are especially helpful   Other medicines that remove dead skin cells can also be effective   Alpha hydroxyl acid  Glycolic acid  Retinoids (adapalene, retinol, tazarotene, trentinoin)  Salicylic acid  Pulse dye laser treatments or intense pulsed light can reduce redness temporarily, but not roughness   Laser assisted hair removal      Other ATOPIC DERMATITIS     Physical Exam:  Anatomic Location Affected:  bilateral feet  Morphological Description:  scaling on plantar feet and interdigital spaces   Pertinent Positives:  Pertinent Negatives:        Assessment and Plan:  Based on a thorough discussion of this condition and the management approach to it (including a comprehensive discussion of the known risks, side effects and potential benefits of treatment), the patient (family) agrees to implement the following specific plan:  Use 40% Urea cream to both feet-available on [employer]     Scribe Attestation      I,:  Ana Cloud MA am acting as a scribe while in the presence of the attending physician.:       I,:  Sherri Carrillo PA-C personally performed the services described in this documentation    as scribed in my presence.:

## 2024-10-01 DIAGNOSIS — F41.1 GENERALIZED ANXIETY DISORDER: ICD-10-CM

## 2024-10-02 ENCOUNTER — TELEPHONE (OUTPATIENT)
Dept: NEPHROLOGY | Facility: CLINIC | Age: 54
End: 2024-10-02

## 2024-10-02 NOTE — TELEPHONE ENCOUNTER
Called patient and was unable to leave a voicemail due to mailbox being full asking to please have blood work drawn before the follow up appointment.

## 2024-10-28 ENCOUNTER — APPOINTMENT (OUTPATIENT)
Dept: LAB | Facility: MEDICAL CENTER | Age: 54
End: 2024-10-28
Payer: COMMERCIAL

## 2024-10-28 DIAGNOSIS — E87.6 HYPOKALEMIA: ICD-10-CM

## 2024-10-28 DIAGNOSIS — I10 ESSENTIAL HYPERTENSION: ICD-10-CM

## 2024-10-28 LAB
ANION GAP SERPL CALCULATED.3IONS-SCNC: 7 MMOL/L (ref 4–13)
BILIRUB UR QL STRIP: NEGATIVE
BUN SERPL-MCNC: 15 MG/DL (ref 5–25)
CALCIUM SERPL-MCNC: 9.4 MG/DL (ref 8.4–10.2)
CHLORIDE SERPL-SCNC: 100 MMOL/L (ref 96–108)
CLARITY UR: CLEAR
CO2 SERPL-SCNC: 30 MMOL/L (ref 21–32)
COLOR UR: COLORLESS
CREAT SERPL-MCNC: 0.82 MG/DL (ref 0.6–1.3)
CREAT UR-MCNC: 49 MG/DL
GFR SERPL CREATININE-BSD FRML MDRD: 81 ML/MIN/1.73SQ M
GLUCOSE SERPL-MCNC: 98 MG/DL (ref 65–140)
GLUCOSE UR STRIP-MCNC: NEGATIVE MG/DL
HGB UR QL STRIP.AUTO: NEGATIVE
KETONES UR STRIP-MCNC: NEGATIVE MG/DL
LEUKOCYTE ESTERASE UR QL STRIP: NEGATIVE
MAGNESIUM SERPL-MCNC: 2.5 MG/DL (ref 1.9–2.7)
MICROALBUMIN UR-MCNC: <7 MG/L
NITRITE UR QL STRIP: NEGATIVE
PH UR STRIP.AUTO: 6.5 [PH]
POTASSIUM SERPL-SCNC: 4.1 MMOL/L (ref 3.5–5.3)
PROT UR STRIP-MCNC: NEGATIVE MG/DL
SODIUM SERPL-SCNC: 137 MMOL/L (ref 135–147)
SP GR UR STRIP.AUTO: 1.01 (ref 1–1.03)
UROBILINOGEN UR STRIP-ACNC: <2 MG/DL

## 2024-10-30 ENCOUNTER — OFFICE VISIT (OUTPATIENT)
Dept: NEPHROLOGY | Facility: CLINIC | Age: 54
End: 2024-10-30
Payer: COMMERCIAL

## 2024-10-30 VITALS
HEIGHT: 66 IN | BODY MASS INDEX: 32.47 KG/M2 | WEIGHT: 202 LBS | SYSTOLIC BLOOD PRESSURE: 126 MMHG | DIASTOLIC BLOOD PRESSURE: 78 MMHG

## 2024-10-30 DIAGNOSIS — I10 ESSENTIAL HYPERTENSION: ICD-10-CM

## 2024-10-30 DIAGNOSIS — E87.6 HYPOKALEMIA: ICD-10-CM

## 2024-10-30 PROCEDURE — 99214 OFFICE O/P EST MOD 30 MIN: CPT | Performed by: INTERNAL MEDICINE

## 2024-10-30 RX ORDER — LISINOPRIL 10 MG/1
10 TABLET ORAL DAILY
Qty: 30 TABLET | Refills: 5 | Status: SHIPPED | OUTPATIENT
Start: 2024-10-30

## 2024-10-30 RX ORDER — POTASSIUM CHLORIDE 1500 MG/1
20 TABLET, EXTENDED RELEASE ORAL 3 TIMES WEEKLY
Start: 2024-10-30

## 2024-10-30 NOTE — PROGRESS NOTES
NEPHROLOGY OUTPATIENT PROGRESS NOTE   Micah Moore 54 y.o. female MRN: [id number]  DATE: 10/30/2024  Reason for visit:   Chief Complaint   Patient presents with    Follow-up    Hypertension    Hypokalemia     ASSESSMENT and PLAN:  Hypokalemia  -Chronic persistent hypokalemia issues noted since early 2023.    -During last office visit HCTZ was reduced, potassium most recent 4.1 normal in October 2024.  -HCTZ can contribute to hypokalemia.  -Initial evaluation including serum aldosterone, PRA currently pending.  -Discontinue HCTZ, decrease potassium supplement from 20 meq daily to 3 times a week.    -Potassium level in 3 weeks.  If potassium level remains normal, will consider discontinue potassium supplement at that point.  -Serum magnesium stable 2.5.  -Potassium liberal diet recommended.     Hypertension  -Blood pressure well-controlled in the office today.    -Will discontinue HCTZ 25 mg daily and increase lisinopril from 5 mg to 10 mg daily.    -Her upper arm BP machine was compared with our office readings which are identical.  Her home BP readings are generally 120s/70s  -Advised to check BP on daily basis and call back if remains persistently less than 110/60 or greater than 135/85  -Salt restricted diet recommended  -She was taking Advil/naproxen about 3 to 4 tablets/week prior.  Recommended to avoid any NSAIDs in the future.     Renal function stable with serum creatinine 0.8  -UA bland without hematuria or proteinuria, UACR nonsignificant in October 2024.    Diagnoses and all orders for this visit:    Essential hypertension  -     lisinopril (ZESTRIL) 10 mg tablet; Take 1 tablet (10 mg total) by mouth daily  -     Albumin / creatinine urine ratio; Future  -     Basic metabolic panel; Future    Hypokalemia  -     potassium chloride (Klor-Con M20) 20 mEq tablet; Take 1 tablet (20 mEq total) by mouth 3 (three) times a week  -     Potassium; Future  -     Basic metabolic panel; Future          SUBJECTIVE /  "HPI:  Micah Moore is a 54 y.o. year old female with medical issues of hypertension for 11 years, hyperlipidemia, hypothyroidism, sleep apnea on CPAP, restless leg syndrome, who presents for regular follow-up of hypertension, hypokalemia.    Patient had occasional cough after starting lisinopril although this has now resolved as per patient.  Blood pressure controlled and acceptable at home and in the office today.  She was found to have chronic persistent hypokalemia issues since early 2023 and since then remains on potassium supplements.   Since reducing HCTZ during last office visit, potassium level has now much improved.   She denies any urinary complaint.  Denies any nausea, vomiting, diarrhea or GI loss history.  Leg edema remains improved and stable.  Denies smoking.  Has history of taking Advil/naproxen 3 to 4 tablets/week.     Family history includes father and mother both having hypertension.    REVIEW OF SYSTEMS:  More than 10 point review of systems were obtained and discussed in length with the patient. Complete review of systems were negative / unremarkable except mentioned above.     PHYSICAL EXAM:  Vitals:    10/30/24 0937 10/30/24 1007   BP: 122/82 126/78   BP Location: Left arm    Patient Position: Sitting    Cuff Size: Standard    Weight: 91.6 kg (202 lb)    Height: 5' 6\" (1.676 m)      Body mass index is 32.6 kg/m².    Physical Exam  Vitals reviewed.   Constitutional:       Appearance: She is well-developed.   HENT:      Head: Normocephalic and atraumatic.      Right Ear: External ear normal.      Left Ear: External ear normal.   Eyes:      Conjunctiva/sclera: Conjunctivae normal.   Cardiovascular:      Comments: Minimal edema in left leg  Pulmonary:      Effort: Pulmonary effort is normal.      Breath sounds: Normal breath sounds. No wheezing or rales.   Abdominal:      General: Bowel sounds are normal. There is no distension.      Palpations: Abdomen is soft.      Tenderness: There is no " abdominal tenderness.   Musculoskeletal:         General: No deformity.   Lymphadenopathy:      Cervical: No cervical adenopathy.   Skin:     Findings: No rash.   Neurological:      Mental Status: She is alert and oriented to person, place, and time.   Psychiatric:         Behavior: Behavior normal.         PAST MEDICAL HISTORY:  Past Medical History:   Diagnosis Date    Axenfeld-Dora syndrome     Disease of thyroid gland     Herniated nucleus pulposus, L5-S1     last assesed [id number]    Hypertension     Obesity     Sleep apnea     Visual impairment Congenital    Axenfeld Riegers Anomaly       PAST SURGICAL HISTORY:  Past Surgical History:   Procedure Laterality Date    CATARACT EXTRACTION, BILATERAL Bilateral     CLOSED REDUCTION ELBOW DISLOCATION      CORNEAL TRANSPLANT      EYE SURGERY  , ,     Bilat cataracts , cornea tranplant 2013    WISDOM TOOTH EXTRACTION         SOCIAL HISTORY:  Social History     Substance and Sexual Activity   Alcohol Use Not Currently    Comment: socially     Social History     Substance and Sexual Activity   Drug Use No     Social History     Tobacco Use   Smoking Status Never   Smokeless Tobacco Never       FAMILY HISTORY:  Family History   Problem Relation Age of Onset    Atrial fibrillation Mother     Stroke Mother     Hypertension Mother     Supraventricular tachycardia Mother     Rheum arthritis Mother     Autoimmune disease Mother         RA    Heart attack Father     Heart disease Father         , MI age 56    Hypertension Father     No Known Problems Sister     Thyroid disease Daughter     No Known Problems Sister     No Known Problems Daughter     No Known Problems Paternal Aunt     No Known Problems Paternal Aunt     No Known Problems Paternal Aunt     No Known Problems Paternal Aunt     No Known Problems Paternal Aunt     Cancer Neg Hx     Diabetes Neg Hx        MEDICATIONS:    Current Outpatient Medications:     acetaminophen (TYLENOL) 325 mg tablet,  Take by mouth as needed , Disp: , Rfl:     atorvastatin (LIPITOR) 10 mg tablet, Take 1 tablet (10 mg total) by mouth daily, Disp: 90 tablet, Rfl: 3    carboxymethylcellulose (REFRESH PLUS) 0.5 % SOLN, Apply to eye, Disp: , Rfl:     FLUoxetine (PROzac) 20 mg capsule, TAKE ONE CAPSULE BY MOUTH EVERY DAY, Disp: 90 capsule, Rfl: 0    levothyroxine 50 mcg tablet, TAKE ONE TABLET BY MOUTH EVERY DAY, Disp: 90 tablet, Rfl: 1    lisinopril (ZESTRIL) 10 mg tablet, Take 1 tablet (10 mg total) by mouth daily, Disp: 30 tablet, Rfl: 5    potassium chloride (Klor-Con M20) 20 mEq tablet, Take 1 tablet (20 mEq total) by mouth 3 (three) times a week, Disp: , Rfl:     loteprednol etabonate (LOTEMAX) 0.5 % ophth gel, Administer 1 drop to the right eye Every other day (Patient not taking: Reported on 10/30/2024), Disp: , Rfl:     Lab Results:   Results for orders placed or performed in visit on 04/10/24   Basic metabolic panel    Collection Time: 10/28/24 11:05 AM   Result Value Ref Range    Sodium 137 135 - 147 mmol/L    Potassium 4.1 3.5 - 5.3 mmol/L    Chloride 100 96 - 108 mmol/L    CO2 30 21 - 32 mmol/L    ANION GAP 7 4 - 13 mmol/L    BUN 15 5 - 25 mg/dL    Creatinine 0.82 0.60 - 1.30 mg/dL    Glucose 98 65 - 140 mg/dL    Calcium 9.4 8.4 - 10.2 mg/dL    eGFR 81 ml/min/1.73sq m   UA (URINE) with reflex to Scope    Collection Time: 10/28/24 11:05 AM   Result Value Ref Range    Color, UA Colorless     Clarity, UA Clear     Specific Gravity, UA 1.011 1.003 - 1.030    pH, UA 6.5 4.5, 5.0, 5.5, 6.0, 6.5, 7.0, 7.5, 8.0    Leukocytes, UA Negative Negative    Nitrite, UA Negative Negative    Protein, UA Negative Negative mg/dl    Glucose, UA Negative Negative mg/dl    Ketones, UA Negative Negative mg/dl    Urobilinogen, UA <2.0 <2.0 mg/dl mg/dl    Bilirubin, UA Negative Negative    Occult Blood, UA Negative Negative   Albumin / creatinine urine ratio    Collection Time: 10/28/24 11:05 AM   Result Value Ref Range    Creatinine, Ur 49.0  Reference range not established. mg/dL    Albumin,U,Random <7.0 <20.0 mg/L    Albumin Creat Ratio     Magnesium    Collection Time: 10/28/24 11:05 AM   Result Value Ref Range    Magnesium 2.5 1.9 - 2.7 mg/dL

## 2024-11-01 LAB
ALDOST SERPL-MCNC: 11.1 NG/DL (ref 0–30)
RENIN PLAS-CCNC: 14.21 NG/ML/HR (ref 0.17–5.38)

## 2024-11-23 ENCOUNTER — APPOINTMENT (OUTPATIENT)
Dept: LAB | Facility: MEDICAL CENTER | Age: 54
End: 2024-11-23
Payer: COMMERCIAL

## 2024-11-23 DIAGNOSIS — E87.6 HYPOKALEMIA: ICD-10-CM

## 2024-11-23 LAB — POTASSIUM SERPL-SCNC: 4.4 MMOL/L (ref 3.5–5.3)

## 2024-11-23 PROCEDURE — 84132 ASSAY OF SERUM POTASSIUM: CPT

## 2024-11-23 PROCEDURE — 36415 COLL VENOUS BLD VENIPUNCTURE: CPT

## 2024-11-25 ENCOUNTER — RESULTS FOLLOW-UP (OUTPATIENT)
Dept: NEPHROLOGY | Facility: CLINIC | Age: 54
End: 2024-11-25

## 2024-11-25 DIAGNOSIS — I10 ESSENTIAL HYPERTENSION: Primary | ICD-10-CM

## 2024-11-25 DIAGNOSIS — E87.6 HYPOKALEMIA: ICD-10-CM

## 2024-11-25 NOTE — TELEPHONE ENCOUNTER
Please let her know potassium level remains normal 4.4 even after reducing potassium chloride supplements to 1 tablet 3 times a week.    Please have her discontinue potassium supplements for now.  She should have repeat BMP in early January 2025.

## 2024-11-26 ENCOUNTER — TELEPHONE (OUTPATIENT)
Dept: NEPHROLOGY | Facility: CLINIC | Age: 54
End: 2024-11-26

## 2024-11-26 DIAGNOSIS — I10 ESSENTIAL HYPERTENSION: Primary | ICD-10-CM

## 2024-11-26 DIAGNOSIS — R60.0 LEG EDEMA: ICD-10-CM

## 2024-11-26 RX ORDER — FUROSEMIDE 20 MG/1
20 TABLET ORAL AS NEEDED
Qty: 30 TABLET | Refills: 0 | Status: SHIPPED | OUTPATIENT
Start: 2024-11-26

## 2024-11-26 NOTE — TELEPHONE ENCOUNTER
Please let her know that I have ordered Lasix 20 mg as needed to the pharmacy.  She should take this only as needed when she has significant leg swelling.  Also would recommend to take potassium supplement 1 tablet when she takes Lasix.

## 2024-11-26 NOTE — TELEPHONE ENCOUNTER
Spoke to pt regarding the following message per Dr. Lala     Please let her know potassium level remains normal 4.4 even after reducing potassium chloride supplements to 1 tablet 3 times a week.    Please have her discontinue potassium supplements for now.  She should have repeat BMP in early January 2025.     Pt verbalized understanding. Labs in epic.

## 2024-12-31 ENCOUNTER — OFFICE VISIT (OUTPATIENT)
Dept: SLEEP CENTER | Facility: CLINIC | Age: 54
End: 2024-12-31
Payer: COMMERCIAL

## 2024-12-31 VITALS
BODY MASS INDEX: 32.47 KG/M2 | HEIGHT: 66 IN | WEIGHT: 202 LBS | SYSTOLIC BLOOD PRESSURE: 112 MMHG | DIASTOLIC BLOOD PRESSURE: 72 MMHG

## 2024-12-31 DIAGNOSIS — Z99.89 DEPENDENCE ON CPAP VENTILATION: ICD-10-CM

## 2024-12-31 DIAGNOSIS — G47.50 PARASOMNIA, UNSPECIFIED TYPE: ICD-10-CM

## 2024-12-31 DIAGNOSIS — F41.9 ANXIETY: ICD-10-CM

## 2024-12-31 DIAGNOSIS — G25.81 RESTLESS LEG SYNDROME: ICD-10-CM

## 2024-12-31 DIAGNOSIS — G47.33 OBSTRUCTIVE SLEEP APNEA SYNDROME: Primary | ICD-10-CM

## 2024-12-31 DIAGNOSIS — F45.8 AEROPHAGIA: ICD-10-CM

## 2024-12-31 DIAGNOSIS — E66.9 OBESITY (BMI 30-39.9): ICD-10-CM

## 2024-12-31 DIAGNOSIS — I10 ESSENTIAL HYPERTENSION: ICD-10-CM

## 2024-12-31 PROCEDURE — 99214 OFFICE O/P EST MOD 30 MIN: CPT | Performed by: INTERNAL MEDICINE

## 2024-12-31 NOTE — PROGRESS NOTES
Follow-Up Note - Sleep Center   Micah Moore  54 y.o. female  :1970  MRN:[id number]  DOS:2024    CC: I saw this patient for follow-up in clinic today for Sleep disordered breathing, Coexisting Sleep and Medical Problems.  Patient received ot a  Dream Station Version 2 machine from [facility] over a year ago.. Interval changes: None reported.      Results of prior studies:  A diagnostic study in 2016 demonstrated AHI of 11.7 per hour, higher during REM at 20.7 per hour and considerably higher while supine at 54 per hour.  Minimum oxygen saturation was 85 percent.  During a subsequent therapeutic study, sleep disordered breathing was adequately remediated with nasal CPAP at 11 centimeter H2O.  There were mild periodic limb movements of sleep.    PFSH, Problem List, Medications & Allergies were reviewed in EMR.   She  has a past medical history of Axenfeld-Dora syndrome, Disease of thyroid gland, Herniated nucleus pulposus, L5-S1, Hypertension, Obesity, Sleep apnea, and Visual impairment (Congenital).    She has a current medication list which includes the following prescription(s): acetaminophen, atorvastatin, carboxymethylcellulose, fluoxetine, furosemide, levothyroxine, lisinopril, and loteprednol etabonate.    PHYSIOLOGICAL DATA REVIEW : Using PAP > 4 hours/night 100%. Estimated JAMEL 5.8/hour with pressure of 15cm H2O@90th/95th percentile;.  INTERPRETATION: Compliance is excellent; Pressure setting is:in acceptable range; ;     SUBJECTIVE: With respect to use of PAP, Micah  is experiencing no adverse effects: Aerophagia has improved since EPR was increased to level 3.She derives benefit.. Is satisfied with sleep and daytime function.     Sleep Routine: Micah reports getting 9 hrs sleep; she has no difficulty initiating, but reports diffculty maintaining sleep . She arises spontaneously and feels refreshed.Micah]denies excessive daytime sleepiness,.  She rated herself at Total  "score: 3 /24 on the [place] Sleepiness Scale.   Other issues: Restless legs have not been disturbing sleep.  She continues to report sleep talking but there have been no further episodes of sleepwalking..     Habits: Reports that she has never smoked. She has never used smokeless tobacco.,  Reports that she does not currently use alcohol.,  Reports no history of drug use., Caffeine use: limited until  ; Exercise routine: regular.      ROS: Significant for weight has been stable.  Mood is stable on Prozac.  Review of systems was otherwise negative..    EXAM: /72   Ht 5' 6\" (1.676 m)   Wt 91.6 kg (202 lb)   LMP  (LMP Unknown)   BMI 32.60 kg/m²     Wt Readings from Last 3 Encounters:   12/31/24 91.6 kg (202 lb)   10/30/24 91.6 kg (202 lb)   08/28/24 91.6 kg (202 lb)      Patient is well groomed; well appearing.   CNS: Alert, orientated, speech clear & coherent  Psych: cooperative and in no distress. Mental state: Appears normal.  H&N: EOMI; NC/AT: No facial pressure marks, no rashes.    Skin/Extrem: col & hydration normal; no edema  Resp: Respiratory effort is normal  Physical findings otherwise essentially unchanged from previous.    IMPRESSION: Problem List Items & Comorbidities Addressed this Visit    1. Obstructive sleep apnea syndrome  PAP DME Pressure Change    PAP DME Resupply/Reorder      2. Dependence on CPAP ventilation        3. Restless leg syndrome        4. Aerophagia        5. Parasomnia, unspecified type        6. Anxiety        7. Essential hypertension        8. Obesity (BMI 30-39.9)            PLAN:  I reviewed results of prior studies and physiologic data with the patient.   I discussed treatment options with risks and benefits.  Treatment with  PAP is medically necessary and Micah is agreable to continue use.   Care of equipment, methods to improve comfort using PAP and importance of compliance with therapy were discussed.  Pressure setting: change 12-16 cmH2O with EPR at level 3 " "using a fullface mask.    Rx provided to replace supplies and Care coordinated with DME provider.   Discussed strategies for weight reduction.    Follow-up is advised in 1 year or sooner if needed to monitor progress, compliance and to adjust therapy.     Thank you for allowing me to participate in the care of this patient.    Sincerely,     Authenticated electronically on 12/31/24   Board Certified Specialist     Portions of the record may have been created with voice recognition software. Occasional wrong word or \"sound a like\" substitutions may have occurred due to the inherent limitations of voice recognition software. There may also be notations and random deletions of words or characters from malfunctioning software. Read the chart carefully and recognize, using context, where substitutions/deletions have occurred.    "

## 2025-01-02 ENCOUNTER — TELEPHONE (OUTPATIENT)
Dept: SLEEP CENTER | Facility: CLINIC | Age: 55
End: 2025-01-02

## 2025-01-02 LAB

## 2025-01-06 DIAGNOSIS — E03.9 HYPOTHYROIDISM, UNSPECIFIED TYPE: ICD-10-CM

## 2025-01-06 DIAGNOSIS — F41.1 GENERALIZED ANXIETY DISORDER: ICD-10-CM

## 2025-01-07 LAB
DME PARACHUTE DELIVERY DATE REQUESTED: NORMAL
DME PARACHUTE ITEM DESCRIPTION: NORMAL
DME PARACHUTE ORDER STATUS: NORMAL
DME PARACHUTE SUPPLIER NAME: NORMAL
DME PARACHUTE SUPPLIER PHONE: NORMAL

## 2025-01-07 RX ORDER — LEVOTHYROXINE SODIUM 50 UG/1
50 TABLET ORAL DAILY
Qty: 30 TABLET | Refills: 0 | Status: SHIPPED | OUTPATIENT
Start: 2025-01-07

## 2025-03-28 ENCOUNTER — HOSPITAL ENCOUNTER (OUTPATIENT)
Dept: MAMMOGRAPHY | Facility: MEDICAL CENTER | Age: 55
Discharge: HOME | End: 2025-03-28
Payer: COMMERCIAL

## 2025-03-28 VITALS — BODY MASS INDEX: 32.47 KG/M2 | HEIGHT: 66 IN | WEIGHT: 202 LBS

## 2025-03-28 DIAGNOSIS — Z12.31 ENCOUNTER FOR SCREENING MAMMOGRAM FOR MALIGNANT NEOPLASM OF BREAST: ICD-10-CM

## 2025-03-28 PROCEDURE — 77063 BREAST TOMOSYNTHESIS BI: CPT

## 2025-03-28 PROCEDURE — 77067 SCR MAMMO BI INCL CAD: CPT

## 2025-03-31 ENCOUNTER — OFFICE VISIT (OUTPATIENT)
Dept: FAMILY MEDICINE CLINIC | Facility: CLINIC | Age: 55
End: 2025-03-31
Payer: COMMERCIAL

## 2025-03-31 VITALS
BODY MASS INDEX: 33.11 KG/M2 | HEART RATE: 71 BPM | HEIGHT: 66 IN | WEIGHT: 206 LBS | TEMPERATURE: 96.7 F | DIASTOLIC BLOOD PRESSURE: 78 MMHG | SYSTOLIC BLOOD PRESSURE: 136 MMHG | OXYGEN SATURATION: 97 %

## 2025-03-31 DIAGNOSIS — E03.9 HYPOTHYROIDISM, UNSPECIFIED TYPE: ICD-10-CM

## 2025-03-31 DIAGNOSIS — E78.5 DYSLIPIDEMIA: ICD-10-CM

## 2025-03-31 DIAGNOSIS — I10 ESSENTIAL HYPERTENSION: ICD-10-CM

## 2025-03-31 DIAGNOSIS — Z23 ENCOUNTER FOR IMMUNIZATION: ICD-10-CM

## 2025-03-31 DIAGNOSIS — R60.0 LEG EDEMA: ICD-10-CM

## 2025-03-31 DIAGNOSIS — Z12.12 SCREENING FOR COLORECTAL CANCER: ICD-10-CM

## 2025-03-31 DIAGNOSIS — F41.1 GENERALIZED ANXIETY DISORDER: ICD-10-CM

## 2025-03-31 DIAGNOSIS — Z12.11 SCREENING FOR COLORECTAL CANCER: ICD-10-CM

## 2025-03-31 DIAGNOSIS — R73.01 IMPAIRED FASTING GLUCOSE: ICD-10-CM

## 2025-03-31 DIAGNOSIS — Z00.00 ANNUAL PHYSICAL EXAM: Primary | ICD-10-CM

## 2025-03-31 PROCEDURE — 90471 IMMUNIZATION ADMIN: CPT

## 2025-03-31 PROCEDURE — 99396 PREV VISIT EST AGE 40-64: CPT | Performed by: FAMILY MEDICINE

## 2025-03-31 PROCEDURE — 90715 TDAP VACCINE 7 YRS/> IM: CPT

## 2025-03-31 PROCEDURE — 99214 OFFICE O/P EST MOD 30 MIN: CPT | Performed by: FAMILY MEDICINE

## 2025-03-31 RX ORDER — ATORVASTATIN CALCIUM 10 MG/1
10 TABLET, FILM COATED ORAL DAILY
Qty: 90 TABLET | Refills: 3 | Status: SHIPPED | OUTPATIENT
Start: 2025-03-31

## 2025-03-31 RX ORDER — FUROSEMIDE 20 MG/1
20 TABLET ORAL AS NEEDED
Qty: 30 TABLET | Refills: 0 | Status: SHIPPED | OUTPATIENT
Start: 2025-03-31

## 2025-03-31 RX ORDER — LISINOPRIL 10 MG/1
10 TABLET ORAL DAILY
Qty: 30 TABLET | Refills: 5 | Status: SHIPPED | OUTPATIENT
Start: 2025-03-31

## 2025-03-31 RX ORDER — LEVOTHYROXINE SODIUM 50 UG/1
50 TABLET ORAL DAILY
Qty: 30 TABLET | Refills: 0 | Status: SHIPPED | OUTPATIENT
Start: 2025-03-31

## 2025-03-31 NOTE — ASSESSMENT & PLAN NOTE
Recommend repeat lipid panel.  Refills provided to continue on atorvastatin 10 mg daily.    Orders:  •  atorvastatin (LIPITOR) 10 mg tablet; Take 1 tablet (10 mg total) by mouth daily  •  Lipid Panel with Direct LDL reflex; Future

## 2025-03-31 NOTE — ASSESSMENT & PLAN NOTE
Recommend repeat labs as noted below.  Continue current management on levothyroxine.    Orders:  •  levothyroxine 50 mcg tablet; Take 1 tablet (50 mcg total) by mouth daily  •  CBC and differential; Future  •  Comprehensive metabolic panel; Future  •  TSH, 3rd generation with Free T4 reflex; Future

## 2025-03-31 NOTE — PROGRESS NOTES
Adult Annual Physical  Name: Micah Moore      : 1970      MRN: [id number]  Encounter Provider: Sherri Hoang DO  Encounter Date: 3/31/2025   Encounter department: [facility]    Assessment & Plan  Annual physical exam    Anticipatory guidance is discussed regarding preventative care.  Patient recently completed mammogram awaiting radiology read on this.  Patient is due for colorectal cancer screening orders are placed for Cologuard.  Reviewed recommended vaccinations patient would like to get her Tdap vaccination today.  Recommend follow-up in 6 months for chronic conditions or sooner as needed.         Dyslipidemia    Recommend repeat lipid panel.  Refills provided to continue on atorvastatin 10 mg daily.    Orders:  •  atorvastatin (LIPITOR) 10 mg tablet; Take 1 tablet (10 mg total) by mouth daily  •  Lipid Panel with Direct LDL reflex; Future    Generalized anxiety disorder    Patient notes stable symptoms.  Refills provided for Prozac continue current management.  Recommend repeat labs as noted below.    Orders:  •  FLUoxetine (PROzac) 20 mg capsule; Take 1 capsule (20 mg total) by mouth daily  •  CBC and differential; Future  •  Comprehensive metabolic panel; Future  •  TSH, 3rd generation with Free T4 reflex; Future    Essential hypertension    Continue current management on lisinopril and Lasix.  Continue follow-up with nephrology as scheduled.  Blood pressures are acceptable at today's visit.    Orders:  •  furosemide (LASIX) 20 mg tablet; Take 1 tablet (20 mg total) by mouth if needed (As needed for worsening leg swelling or weight gain greater than 5 pounds in 2 to 3 days)  •  lisinopril (ZESTRIL) 10 mg tablet; Take 1 tablet (10 mg total) by mouth daily    Leg edema    Continues with mild pitting edema.  Has been avoiding salt.  Taking Lasix as needed.    Orders:  •  furosemide (LASIX) 20 mg tablet; Take 1 tablet (20 mg total) by mouth if needed (As needed for worsening  leg swelling or weight gain greater than 5 pounds in 2 to 3 days)    Hypothyroidism, unspecified type    Recommend repeat labs as noted below.  Continue current management on levothyroxine.    Orders:  •  levothyroxine 50 mcg tablet; Take 1 tablet (50 mcg total) by mouth daily  •  CBC and differential; Future  •  Comprehensive metabolic panel; Future  •  TSH, 3rd generation with Free T4 reflex; Future    Impaired fasting glucose    History of A1c elevations.  Recommend repeat labs.    Orders:  •  Hemoglobin A1C; Future    Encounter for immunization    Orders:  •  TDAP VACCINE GREATER THAN OR EQUAL TO 6YO IM    Screening for colorectal cancer    Orders:  •  Cologuard      Preventive Screenings:  - Diabetes Screening: screening up-to-date  - Cholesterol Screening: has hyperlipidemia   - Hepatitis C screening: screening up-to-date   - HIV screening: screening up-to-date   - Cervical cancer screening: screening up-to-date   - Breast cancer screening: screening up-to-date   - Colon cancer screening: risks/benefits discussed and orders placed   - Lung cancer screening: screening not indicated     Immunizations:  - Immunizations due: Influenza    Counseling/Anticipatory Guidance:    - Diet: discussed recommendations for a healthy/well-balanced diet.   - Exercise: the importance of regular exercise/physical activity was discussed. Recommend exercise 3-5 times per week for at least 30 minutes.          History of Present Illness     Adult Annual Physical:  Patient presents for annual physical.     Diet and Physical Activity:  - Diet/Nutrition: no special diet and heart healthy (low sodium) diet.  - Exercise: walking and 1-2 times a week on average.    General Health:  - Sleep: sleeps well, 7-8 hours of sleep on average and uses CPAP machine.  - Hearing: normal hearing bilateral ears.  - Vision: vision problems and wears glasses and contacts.  - Dental: regular dental visits.    /GYN Health:  - Follows with GYN: yes.   -  "Menopause: postmenopausal.   - History of STDs: no  - Contraception: menopause.      Advanced Care Planning:  - Has an advanced directive?: yes    - Has a durable medical POA?: yes      Review of Systems   Constitutional:  Negative for chills and fever.   HENT:  Negative for ear pain and sore throat.    Eyes:  Negative for pain and visual disturbance.   Respiratory:  Negative for cough and shortness of breath.    Cardiovascular:  Negative for chest pain and palpitations.   Gastrointestinal:  Negative for abdominal pain and vomiting.   Genitourinary:  Negative for dysuria and hematuria.   Musculoskeletal:  Negative for arthralgias and back pain.   Skin:  Negative for color change and rash.   Neurological:  Negative for seizures and syncope.         Objective   /78   Pulse 71   Temp (!) 96.7 °F (35.9 °C)   Ht 5' 6\" (1.676 m)   Wt 93.4 kg (206 lb)   LMP  (LMP Unknown)   SpO2 97%   BMI 33.25 kg/m²     Physical Exam  Vitals reviewed.   Constitutional:       General: She is not in acute distress.     Appearance: She is well-developed.   HENT:      Head: Normocephalic and atraumatic.      Right Ear: Tympanic membrane, ear canal and external ear normal.      Left Ear: Tympanic membrane, ear canal and external ear normal.      Nose: Nose normal.      Mouth/Throat:      Mouth: Mucous membranes are moist.      Pharynx: Oropharynx is clear.   Eyes:      Conjunctiva/sclera: Conjunctivae normal.      Pupils: Pupils are equal, round, and reactive to light.   Cardiovascular:      Rate and Rhythm: Normal rate and regular rhythm.      Heart sounds: No murmur heard.  Pulmonary:      Effort: Pulmonary effort is normal. No respiratory distress.      Breath sounds: Normal breath sounds.   Abdominal:      Palpations: Abdomen is soft.      Tenderness: There is no abdominal tenderness.   Musculoskeletal:      Cervical back: Neck supple.      Right lower leg: No edema.      Left lower leg: No edema.   Lymphadenopathy:      " Cervical: No cervical adenopathy.   Skin:     General: Skin is warm and dry.   Neurological:      General: No focal deficit present.      Mental Status: She is alert and oriented to person, place, and time.   Psychiatric:         Mood and Affect: Mood normal.         Behavior: Behavior normal.

## 2025-03-31 NOTE — ASSESSMENT & PLAN NOTE
Continue current management on lisinopril and Lasix.  Continue follow-up with nephrology as scheduled.  Blood pressures are acceptable at today's visit.    Orders:  •  furosemide (LASIX) 20 mg tablet; Take 1 tablet (20 mg total) by mouth if needed (As needed for worsening leg swelling or weight gain greater than 5 pounds in 2 to 3 days)  •  lisinopril (ZESTRIL) 10 mg tablet; Take 1 tablet (10 mg total) by mouth daily

## 2025-03-31 NOTE — PATIENT INSTRUCTIONS
"Patient Education     Routine physical for adults   The Basics   Written by the doctors and editors at [facility]   What is a physical? -- A physical is a routine visit, or \"check-up,\" with your doctor. You might also hear it called a \"wellness visit\" or \"preventive visit.\"  During each visit, the doctor will:   Ask about your physical and mental health   Ask about your habits, behaviors, and lifestyle   Do an exam   Give you vaccines if needed   Talk to you about any medicines you take   Give advice about your health   Answer your questions  Getting regular check-ups is an important part of taking care of your health. It can help your doctor find and treat any problems you have. But it's also important for preventing health problems.  A routine physical is different from a \"sick visit.\" A sick visit is when you see a doctor because of a health concern or problem. Since physicals are scheduled ahead of time, you can think about what you want to ask the doctor.  How often should I get a physical? -- It depends on your age and health. In general, for people age 21 years and older:   If you are younger than 50 years, you might be able to get a physical every 3 years.   If you are 50 years or older, your doctor might recommend a physical every year.  If you have an ongoing health condition, like diabetes or high blood pressure, your doctor will probably want to see you more often.  What happens during a physical? -- In general, each visit will include:   Physical exam - The doctor or nurse will check your height, weight, heart rate, and blood pressure. They will also look at your eyes and ears. They will ask about how you are feeling and whether you have any symptoms that bother you.   Medicines - It's a good idea to bring a list of all the medicines you take to each doctor visit. Your doctor will talk to you about your medicines and answer any questions. Tell them if you are having any side effects that bother you. You " "should also tell them if you are having trouble paying for any of your medicines.   Habits and behaviors - This includes:   Your diet   Your exercise habits   Whether you smoke, drink alcohol, or use drugs   Whether you are sexually active   Whether you feel safe at home  Your doctor will talk to you about things you can do to improve your health and lower your risk of health problems. They will also offer help and support. For example, if you want to quit smoking, they can give you advice and might prescribe medicines. If you want to improve your diet or get more physical activity, they can help you with this, too.   Lab tests, if needed - The tests you get will depend on your age and situation. For example, your doctor might want to check your:   Cholesterol   Blood sugar   Iron level   Vaccines - The recommended vaccines will depend on your age, health, and what vaccines you already had. Vaccines are very important because they can prevent certain serious or deadly infections.   Discussion of screening - \"Screening\" means checking for diseases or other health problems before they cause symptoms. Your doctor can recommend screening based on your age, risk, and preferences. This might include tests to check for:   Cancer, such as breast, prostate, cervical, ovarian, colorectal, prostate, lung, or skin cancer   Sexually transmitted infections, such as chlamydia and gonorrhea   Mental health conditions like depression and anxiety  Your doctor will talk to you about the different types of screening tests. They can help you decide which screenings to have. They can also explain what the results might mean.   Answering questions - The physical is a good time to ask the doctor or nurse questions about your health. If needed, they can refer you to other doctors or specialists, too.  Adults older than 65 years often need other care, too. As you get older, your doctor will talk to you about:   How to prevent falling at " home   Hearing or vision tests   Memory testing   How to take your medicines safely   Making sure that you have the help and support you need at home  All topics are updated as new evidence becomes available and our peer review process is complete.  This topic retrieved from [employer] on: May 02, 2024.  Topic 249607 Version 1.0  Release: 32.4.3 - C32.122  © 2024 UpToDate, Inc. and/or its affiliates. All rights reserved.  Consumer Information Use and Disclaimer   Disclaimer: This generalized information is a limited summary of diagnosis, treatment, and/or medication information. It is not meant to be comprehensive and should be used as a tool to help the user understand and/or assess potential diagnostic and treatment options. It does NOT include all information about conditions, treatments, medications, side effects, or risks that may apply to a specific patient. It is not intended to be medical advice or a substitute for the medical advice, diagnosis, or treatment of a health care provider based on the health care provider's examination and assessment of a patient's specific and unique circumstances. Patients must speak with a health care provider for complete information about their health, medical questions, and treatment options, including any risks or benefits regarding use of medications. This information does not endorse any treatments or medications as safe, effective, or approved for treating a specific patient. UpToDate, Inc. and its affiliates disclaim any warranty or liability relating to this information or the use thereof.The use of this information is governed by the Terms of Use, available at https://www.wolterswalkbyuwer.com/en/know/clinical-effectiveness-terms. 2024© UpToDate, Inc. and its affiliates and/or licensors. All rights reserved.  Copyright   © 2024 UpToDate, Inc. and/or its affiliates. All rights reserved.

## 2025-05-07 ENCOUNTER — OFFICE VISIT (OUTPATIENT)
Dept: DERMATOLOGY | Facility: CLINIC | Age: 55
End: 2025-05-07
Payer: COMMERCIAL

## 2025-05-07 ENCOUNTER — TELEPHONE (OUTPATIENT)
Dept: NEPHROLOGY | Facility: CLINIC | Age: 55
End: 2025-05-07

## 2025-05-07 VITALS — WEIGHT: 210 LBS | TEMPERATURE: 98.4 F | BODY MASS INDEX: 33.89 KG/M2

## 2025-05-07 DIAGNOSIS — L64.9 ANDROGENETIC ALOPECIA: Primary | ICD-10-CM

## 2025-05-07 PROCEDURE — 99214 OFFICE O/P EST MOD 30 MIN: CPT | Performed by: DERMATOLOGY

## 2025-05-07 RX ORDER — MINOXIDIL 2.5 MG/1
TABLET ORAL
Qty: 45 TABLET | Refills: 1 | Status: SHIPPED | OUTPATIENT
Start: 2025-05-07

## 2025-05-07 NOTE — PROGRESS NOTES
"[facility] Dermatology Clinic Note     Patient Name: Micah Moore  Encounter Date: 5/7/2025     Have you been cared for by a [facility] Dermatologist in the last 3 years and, if so, which description applies to you? Yes. I have been here within the last 3 years, and my medical history has NOT changed since that time. I am not of child-bearing potential.     REVIEW OF SYSTEMS:  Have you recently had or currently have any of the following? No changes in my recent health.   PAST MEDICAL HISTORY:  Have you personally ever had or currently have any of the following?  If \"YES,\" then please provide more detail. No changes in my medical history.   HISTORY OF IMMUNOSUPPRESSION: Do you have a history of any of the following:  Systemic Immunosuppression such as Diabetes, Biologic or Immunotherapy, Chemotherapy, Organ Transplantation, Bone Marrow Transplantation or Prednisone?  No     Answering \"YES\" requires the addition of the dotphrase \"IMMUNOSUPPRESSED\" as the first diagnosis of the patient's visit.   FAMILY HISTORY:  Any \"first degree relatives\" (parent, brother, sister, or child) with the following?    No changes in my family's known health.   PATIENT EXPERIENCE:    Do you want the Dermatologist to perform a COMPLETE skin exam today including a clinical examination under the \"bra and underwear\" areas?  NO  If necessary, do we have your permission to call and leave a detailed message on your Preferred Phone number that includes your specific medical information?  Yes      Allergies   Allergen Reactions    Sulfa Antibiotics Rash      Current Outpatient Medications:     acetaminophen (TYLENOL) 325 mg tablet, Take by mouth as needed , Disp: , Rfl:     atorvastatin (LIPITOR) 10 mg tablet, Take 1 tablet (10 mg total) by mouth daily, Disp: 90 tablet, Rfl: 3    carboxymethylcellulose (REFRESH PLUS) 0.5 % SOLN, Apply to eye, Disp: , Rfl:     FLUoxetine (PROzac) 20 mg capsule, Take 1 capsule (20 mg total) by mouth daily, Disp: " 30 capsule, Rfl: 0    furosemide (LASIX) 20 mg tablet, Take 1 tablet (20 mg total) by mouth if needed (As needed for worsening leg swelling or weight gain greater than 5 pounds in 2 to 3 days), Disp: 30 tablet, Rfl: 0    levothyroxine 50 mcg tablet, Take 1 tablet (50 mcg total) by mouth daily, Disp: 30 tablet, Rfl: 0    lisinopril (ZESTRIL) 10 mg tablet, Take 1 tablet (10 mg total) by mouth daily, Disp: 30 tablet, Rfl: 5        Whom besides the patient is providing clinical information about today's encounter?   NO ADDITIONAL HISTORIAN (patient alone provided history)    Physical Exam and Assessment/Plan by Diagnosis:    ANDROGENETIC ALOPECIA    Chronic illness with exacerbation, progression or side effects of treatment     Physical Exam:    Well appearing, in no acute distress. Well nourished, well developed. Alert and oriented. A focused skin exam was performed including scalp and hair. The exam was negative except as noted below:     Scalp:   Thinning over the vertex scalp, frontal scalp, along hairline   Negative hair pull test  No perifollicular erythema, pustules, scale  No scarred areas   Eyebrows and Eyelashes Intact - patient reports thinning in eyebrows   Additional History of Present Condition:  Patient was last seen in office for hair loss on 8/9/2021. She reports she has not used over the counter Rogaine. She does use a special shampoo but can't recall the name. Patient reports hair loss in the family from paternal grandmother. Patient does have hypothyroidism.    FEMALE PATIENT Assessment and Plan:  - History and physical consistent with androgenetic alopecia  - Discussed treatment ladder, including PO and topical minoxidil, PRP, low level laser therapy, supplements, finasteride and dutasteride, hair transplant  - Discussed topical minoxidil compound once daily. Counseled patient that they may see some increased shedding within the first few weeks of treatment, which is a normal side effect and will  resolve spontaneously. They may also see mild facial hypertrichosis. Educated that treatment should be continuous to maintain efficacy. Stop if redness, itching develops.   - Discussed PO minoxidil 1.25 mg daily which  has been shown to improve hair growth at low dose. Educated on side effects, including hypertrichosis, hypotension, arrhythmia, lower extremity edema. Educated that we could titrate up to 2.5 mg daily with increased risk of hypertrichosis after one month if not experiencing any side effects.   - Discussed PO finasteride 5 mg. No personal or family history of breast cancer. Discussed generally well tolerated in post-menopausal women without side effects. Discussed that this can be used in women who are pre-menopausal if they are on OCP or similarly effective method of contraception.   - Discussed spironolactone 100 mg QHS. Educated on side effects including menstrual irregularity, breast tenderness, headaches, GI upset, K+ retention, palpitations, teratogenicity. No history of kidney/liver dysfunction, no strong family history of breast cancer, not currently pregnant or planning for pregnancy - will need to discuss with nephrology if decide on this treatment  - If patient would like to pursue supplements, recommended Nutrafol or Inner Glow - to start only one of them and to purchase them directly from their websites.   - Discussed additional options of light laser therapy and in-office PRP treatment which consists of 3-4 monthly injections followed by maintenance treatments every 3-6 months.   - Educated patient that regrowth may take many months and results are not guaranteed/vary among patients.     - Start InnerGlow supplements  - Start minoxidil 1.25 mg for 1 month, then start 2.5 mg daily  - Labs ordered for Vitamin D  - Will see patient back in 6 mos to assess progress     Scribe Attestation      I,:  Alysia Hull MA am acting as a scribe while in the presence of the attending physician.:        I,:  Liza Núñez MD personally performed the services described in this documentation    as scribed in my presence.:

## 2025-05-07 NOTE — PATIENT INSTRUCTIONS
FEMALE PATIENT Assessment and Plan:  - History and physical consistent with androgenetic alopecia  - Discussed treatment ladder, including PO and topical minoxidil, PRP, low level laser therapy, supplements, finasteride and dutasteride, hair transplant  - Discussed topical minoxidil compound once daily. Counseled patient that they may see some increased shedding within the first few weeks of treatment, which is a normal side effect and will resolve spontaneously. They may also see mild facial hypertrichosis. Educated that treatment should be continuous to maintain efficacy. Stop if redness, itching develops.   - Discussed PO minoxidil 1.25 mg daily which  has been shown to improve hair growth at low dose. Educated on side effects, including hypertrichosis, hypotension, arrhythmia, lower extremity edema. Educated that we could titrate up to 2.5 mg daily with increased risk of hypertrichosis after one month if not experiencing any side effects.   - Discussed PO finasteride 5 mg. No personal or family history of breast cancer. Discussed generally well tolerated in post-menopausal women without side effects. Discussed that this can be used in women who are pre-menopausal if they are on OCP or similarly effective method of contraception.   - Discussed spironolactone 100 mg QHS. Educated on side effects including menstrual irregularity, breast tenderness, headaches, GI upset, K+ retention, palpitations, teratogenicity. No history of kidney/liver dysfunction, no strong family history of breast cancer, not currently pregnant or planning for pregnancy - will need to discuss with nephrology if decide on this treatment  - If patient would like to pursue supplements, recommended Nutrafol or Inner Glow - to start only one of them and to purchase them directly from their websites.   - Discussed additional options of light laser therapy and in-office PRP treatment which consists of 3-4 monthly injections followed by maintenance  treatments every 3-6 months.   - Educated patient that regrowth may take many months and results are not guaranteed/vary among patients.     - Start InnerGlow supplements  - Start minoxidil 1.25 mg for 1 month, then start 2.5 mg daily  - Labs ordered for Vitamin D  - Will see patient back in 3 mos to assess progress  - follow up in 6 months

## 2025-05-08 ENCOUNTER — OFFICE VISIT (OUTPATIENT)
Dept: FAMILY MEDICINE CLINIC | Facility: CLINIC | Age: 55
End: 2025-05-08
Payer: COMMERCIAL

## 2025-05-08 VITALS
OXYGEN SATURATION: 97 % | HEART RATE: 78 BPM | WEIGHT: 207.8 LBS | TEMPERATURE: 97.4 F | HEIGHT: 66 IN | DIASTOLIC BLOOD PRESSURE: 72 MMHG | SYSTOLIC BLOOD PRESSURE: 120 MMHG | BODY MASS INDEX: 33.4 KG/M2

## 2025-05-08 DIAGNOSIS — J40 BRONCHITIS: Primary | ICD-10-CM

## 2025-05-08 PROCEDURE — 99213 OFFICE O/P EST LOW 20 MIN: CPT | Performed by: FAMILY MEDICINE

## 2025-05-08 RX ORDER — ALBUTEROL SULFATE 90 UG/1
2 INHALANT RESPIRATORY (INHALATION) EVERY 6 HOURS PRN
Qty: 18 G | Refills: 5 | Status: SHIPPED | OUTPATIENT
Start: 2025-05-08

## 2025-05-08 RX ORDER — LEVOFLOXACIN 500 MG/1
500 TABLET, FILM COATED ORAL EVERY 24 HOURS
Qty: 7 TABLET | Refills: 0 | Status: SHIPPED | OUTPATIENT
Start: 2025-05-08 | End: 2025-05-15

## 2025-05-08 NOTE — PROGRESS NOTES
"Name: Micah Moore      : 1970      MRN: [id number]  Encounter Provider: Filiberto Dee DO  Encounter Date: 2025   Encounter department: [facility]  :  Assessment & Plan  Bronchitis  Side effect profile of medication reviewed.  Recommend chest x-ray if no improvement or worsening symptoms.  Orders:    levofloxacin (LEVAQUIN) 500 mg tablet; Take 1 tablet (500 mg total) by mouth every 24 hours for 7 days    albuterol (Ventolin HFA) 90 mcg/act inhaler; Inhale 2 puffs every 6 (six) hours as needed for wheezing           History of Present Illness   Patient with 1 to 2-week history of intermittent cough.  Mother was diagnosed with influenza a few weeks ago.  Patient denies any fevers.  No significant shortness of breath.  Cough is occasionally productive.      Review of Systems   Constitutional: Negative.  Negative for fatigue and fever.   HENT:  Positive for congestion.    Eyes: Negative.    Respiratory:  Positive for cough.    Cardiovascular: Negative.    Gastrointestinal: Negative.    Endocrine: Negative.    Genitourinary: Negative.    Musculoskeletal: Negative.    Skin: Negative.    Allergic/Immunologic: Negative.    Neurological: Negative.    Hematological: Negative.    Psychiatric/Behavioral: Negative.         Objective   /72 (BP Location: Left arm, Patient Position: Sitting, Cuff Size: Large)   Pulse 78   Temp (!) 97.4 °F (36.3 °C) (Tympanic)   Ht 5' 6\" (1.676 m)   Wt 94.3 kg (207 lb 12.8 oz)   LMP  (LMP Unknown)   SpO2 97%   BMI 33.54 kg/m²      Physical Exam  Constitutional:       General: She is not in acute distress.     Appearance: She is well-developed. She is not diaphoretic.   HENT:      Head: Normocephalic and atraumatic.      Right Ear: External ear normal.      Left Ear: External ear normal.      Nose: Nose normal.      Mouth/Throat:      Pharynx: Oropharynx is clear.   Eyes:      General: No scleral icterus.        Right eye: No discharge.         " Left eye: No discharge.      Conjunctiva/sclera: Conjunctivae normal.      Pupils: Pupils are equal, round, and reactive to light.   Neck:      Thyroid: No thyromegaly.      Vascular: No JVD.      Trachea: No tracheal deviation.   Cardiovascular:      Rate and Rhythm: Normal rate and regular rhythm.      Heart sounds: Normal heart sounds. No murmur heard.     No friction rub. No gallop.   Pulmonary:      Effort: Pulmonary effort is normal. No respiratory distress.      Breath sounds: Normal breath sounds. No wheezing or rales.   Chest:      Chest wall: No tenderness.   Abdominal:      General: Bowel sounds are normal. There is no distension.      Palpations: Abdomen is soft. There is no mass.      Tenderness: There is no abdominal tenderness. There is no guarding or rebound.      Hernia: No hernia is present.   Musculoskeletal:         General: No tenderness or deformity. Normal range of motion.      Cervical back: Normal range of motion and neck supple.   Lymphadenopathy:      Cervical: No cervical adenopathy.   Skin:     General: Skin is warm and dry.      Capillary Refill: Capillary refill takes less than 2 seconds.      Coloration: Skin is not pale.      Findings: No erythema or rash.   Neurological:      Mental Status: She is alert and oriented to person, place, and time.      Cranial Nerves: No cranial nerve deficit.      Sensory: No sensory deficit.      Motor: No abnormal muscle tone.      Coordination: Coordination normal.      Deep Tendon Reflexes: Reflexes normal.   Psychiatric:         Mood and Affect: Mood normal.         Behavior: Behavior normal.

## 2025-05-10 ENCOUNTER — APPOINTMENT (OUTPATIENT)
Dept: LAB | Facility: MEDICAL CENTER | Age: 55
End: 2025-05-10
Payer: COMMERCIAL

## 2025-05-10 ENCOUNTER — APPOINTMENT (OUTPATIENT)
Dept: LAB | Facility: MEDICAL CENTER | Age: 55
End: 2025-05-10
Attending: PREVENTIVE MEDICINE
Payer: COMMERCIAL

## 2025-05-10 DIAGNOSIS — E78.5 DYSLIPIDEMIA: ICD-10-CM

## 2025-05-10 DIAGNOSIS — R73.01 IMPAIRED FASTING GLUCOSE: ICD-10-CM

## 2025-05-10 DIAGNOSIS — Z00.8 HEALTH EXAMINATION IN POPULATION SURVEY: ICD-10-CM

## 2025-05-10 DIAGNOSIS — L64.9 ANDROGENETIC ALOPECIA: ICD-10-CM

## 2025-05-10 DIAGNOSIS — I10 ESSENTIAL HYPERTENSION: ICD-10-CM

## 2025-05-10 DIAGNOSIS — F41.1 GENERALIZED ANXIETY DISORDER: ICD-10-CM

## 2025-05-10 DIAGNOSIS — E03.9 HYPOTHYROIDISM, UNSPECIFIED TYPE: ICD-10-CM

## 2025-05-10 DIAGNOSIS — E87.6 HYPOKALEMIA: ICD-10-CM

## 2025-05-10 LAB
25(OH)D3 SERPL-MCNC: 8.2 NG/ML (ref 30–100)
ALBUMIN SERPL BCG-MCNC: 4.4 G/DL (ref 3.5–5)
ALP SERPL-CCNC: 125 U/L (ref 34–104)
ALT SERPL W P-5'-P-CCNC: 36 U/L (ref 7–52)
ANION GAP SERPL CALCULATED.3IONS-SCNC: 8 MMOL/L (ref 4–13)
AST SERPL W P-5'-P-CCNC: 24 U/L (ref 13–39)
BASOPHILS # BLD AUTO: 0.05 THOUSANDS/ÂΜL (ref 0–0.1)
BASOPHILS NFR BLD AUTO: 1 % (ref 0–1)
BILIRUB SERPL-MCNC: 0.46 MG/DL (ref 0.2–1)
BUN SERPL-MCNC: 14 MG/DL (ref 5–25)
CALCIUM SERPL-MCNC: 9.6 MG/DL (ref 8.4–10.2)
CHLORIDE SERPL-SCNC: 102 MMOL/L (ref 96–108)
CHOLEST SERPL-MCNC: 213 MG/DL (ref ?–200)
CO2 SERPL-SCNC: 29 MMOL/L (ref 21–32)
CREAT SERPL-MCNC: 0.9 MG/DL (ref 0.6–1.3)
CREAT UR-MCNC: 50 MG/DL
EOSINOPHIL # BLD AUTO: 0.11 THOUSAND/ÂΜL (ref 0–0.61)
EOSINOPHIL NFR BLD AUTO: 2 % (ref 0–6)
ERYTHROCYTE [DISTWIDTH] IN BLOOD BY AUTOMATED COUNT: 13.3 % (ref 11.6–15.1)
EST. AVERAGE GLUCOSE BLD GHB EST-MCNC: 120 MG/DL
GFR SERPL CREATININE-BSD FRML MDRD: 72 ML/MIN/1.73SQ M
GLUCOSE P FAST SERPL-MCNC: 106 MG/DL (ref 65–99)
HBA1C MFR BLD: 5.8 %
HCT VFR BLD AUTO: 39.5 % (ref 34.8–46.1)
HDLC SERPL-MCNC: 36 MG/DL
HGB BLD-MCNC: 13.1 G/DL (ref 11.5–15.4)
IMM GRANULOCYTES # BLD AUTO: 0.03 THOUSAND/UL (ref 0–0.2)
IMM GRANULOCYTES NFR BLD AUTO: 1 % (ref 0–2)
LDLC SERPL CALC-MCNC: 110 MG/DL (ref 0–100)
LYMPHOCYTES # BLD AUTO: 1.35 THOUSANDS/ÂΜL (ref 0.6–4.47)
LYMPHOCYTES NFR BLD AUTO: 25 % (ref 14–44)
MCH RBC QN AUTO: 29.2 PG (ref 26.8–34.3)
MCHC RBC AUTO-ENTMCNC: 33.2 G/DL (ref 31.4–37.4)
MCV RBC AUTO: 88 FL (ref 82–98)
MICROALBUMIN UR-MCNC: 10.6 MG/L
MICROALBUMIN/CREAT 24H UR: 21 MG/G CREATININE (ref 0–30)
MONOCYTES # BLD AUTO: 0.42 THOUSAND/ÂΜL (ref 0.17–1.22)
MONOCYTES NFR BLD AUTO: 8 % (ref 4–12)
NEUTROPHILS # BLD AUTO: 3.47 THOUSANDS/ÂΜL (ref 1.85–7.62)
NEUTS SEG NFR BLD AUTO: 63 % (ref 43–75)
NRBC BLD AUTO-RTO: 0 /100 WBCS
PLATELET # BLD AUTO: 214 THOUSANDS/UL (ref 149–390)
PMV BLD AUTO: 11.2 FL (ref 8.9–12.7)
POTASSIUM SERPL-SCNC: 4.3 MMOL/L (ref 3.5–5.3)
PROT SERPL-MCNC: 7.2 G/DL (ref 6.4–8.4)
RBC # BLD AUTO: 4.49 MILLION/UL (ref 3.81–5.12)
SODIUM SERPL-SCNC: 139 MMOL/L (ref 135–147)
TRIGL SERPL-MCNC: 335 MG/DL (ref ?–150)
TSH SERPL DL<=0.05 MIU/L-ACNC: 2.96 UIU/ML (ref 0.45–4.5)
WBC # BLD AUTO: 5.43 THOUSAND/UL (ref 4.31–10.16)

## 2025-05-10 PROCEDURE — 80061 LIPID PANEL: CPT

## 2025-05-10 PROCEDURE — 84443 ASSAY THYROID STIM HORMONE: CPT

## 2025-05-10 PROCEDURE — 82306 VITAMIN D 25 HYDROXY: CPT

## 2025-05-10 PROCEDURE — 82570 ASSAY OF URINE CREATININE: CPT

## 2025-05-10 PROCEDURE — 80053 COMPREHEN METABOLIC PANEL: CPT

## 2025-05-10 PROCEDURE — 83036 HEMOGLOBIN GLYCOSYLATED A1C: CPT

## 2025-05-10 PROCEDURE — 82043 UR ALBUMIN QUANTITATIVE: CPT

## 2025-05-10 PROCEDURE — 36415 COLL VENOUS BLD VENIPUNCTURE: CPT

## 2025-05-10 PROCEDURE — 85025 COMPLETE CBC W/AUTO DIFF WBC: CPT

## 2025-05-12 ENCOUNTER — RESULTS FOLLOW-UP (OUTPATIENT)
Dept: FAMILY MEDICINE CLINIC | Facility: CLINIC | Age: 55
End: 2025-05-12

## 2025-05-14 ENCOUNTER — RESULTS FOLLOW-UP (OUTPATIENT)
Dept: DERMATOLOGY | Facility: CLINIC | Age: 55
End: 2025-05-14

## 2025-05-14 DIAGNOSIS — E55.9 VITAMIN D DEFICIENCY: Primary | ICD-10-CM

## 2025-05-14 RX ORDER — ERGOCALCIFEROL 1.25 MG/1
50000 CAPSULE ORAL WEEKLY
Qty: 8 CAPSULE | Refills: 0 | Status: SHIPPED | OUTPATIENT
Start: 2025-05-14

## 2025-05-15 ENCOUNTER — OFFICE VISIT (OUTPATIENT)
Dept: NEPHROLOGY | Facility: CLINIC | Age: 55
End: 2025-05-15
Payer: COMMERCIAL

## 2025-05-15 VITALS
HEIGHT: 66 IN | SYSTOLIC BLOOD PRESSURE: 118 MMHG | BODY MASS INDEX: 33.27 KG/M2 | WEIGHT: 207 LBS | DIASTOLIC BLOOD PRESSURE: 64 MMHG

## 2025-05-15 DIAGNOSIS — I10 ESSENTIAL HYPERTENSION: Primary | ICD-10-CM

## 2025-05-15 DIAGNOSIS — E55.9 VITAMIN D DEFICIENCY: ICD-10-CM

## 2025-05-15 DIAGNOSIS — I10 ESSENTIAL HYPERTENSION: ICD-10-CM

## 2025-05-15 DIAGNOSIS — E87.6 HYPOKALEMIA: ICD-10-CM

## 2025-05-15 PROCEDURE — 99214 OFFICE O/P EST MOD 30 MIN: CPT | Performed by: INTERNAL MEDICINE

## 2025-05-15 RX ORDER — LISINOPRIL 10 MG/1
10 TABLET ORAL DAILY
Qty: 90 TABLET | Refills: 1 | OUTPATIENT
Start: 2025-05-15

## 2025-05-15 NOTE — ASSESSMENT & PLAN NOTE
Hypertension  -Blood pressure well-controlled in the office today.    -Her home BP readings are generally 120s to low 130s/70s  -Current regimen includes lisinopril 10 mg daily.  -She was recently started on minoxidil 2.5 mg daily for hair loss by dermatology 1 week ago.  -She takes as needed Lasix generally once or twice a week.  Advised to use compression stockings, keep legs elevated to minimize Lasix use.  -If continue to have worsening leg edema, may consider discussing with dermatology if minoxidil can be changed to topical.  -Her upper arm BP machine was compared with our office readings prior which are identical.   -Salt restricted diet recommended.  She admits to be using higher salt diet at times  - She has history of taking Advil/naproxen about 3 to 4 tablets/week in the past but now continue to remain off.      Renal function stable with serum creatinine 0.9  -UA bland without hematuria or proteinuria, UACR nonsignificant in May 2025.

## 2025-05-15 NOTE — PATIENT INSTRUCTIONS
"Patient Education     Low-sodium diet   The Basics   Written by the doctors and editors at [facility]   What is sodium? -- This is the main ingredient in table salt. It is also found in lots of foods. The body needs a very small amount of sodium to work normally, but most people eat much more sodium than their body needs.  Who should eat less sodium? -- Nearly everyone eats too much sodium. The average American takes in 3400 milligrams of sodium each day. Experts say that most people should have no more than 2300 milligrams a day.  Some people with certain health conditions should follow a low-sodium diet. Ask your doctor how much sodium you should have.  Why should I eat less sodium? -- Reducing the amount of sodium you eat can have lots of health benefits:   It can lower your blood pressure. This means that it can help lower your risk of stroke, heart attack, kidney damage, and lots of other health problems.   It can reduce the amount of fluid in your body, which means that your heart doesn't have to work as hard.   It can keep the kidneys from having to work too hard. This is especially important in people who have kidney disease.   It can reduce swelling in the ankles and belly, which can be uncomfortable and make it hard to move.   It can lower the chances of forming kidney stones.   It can help keep your bones strong.  Which foods have the most sodium? -- Processed foods have the most sodium. These foods usually come in cans, boxes, jars, and bags. They tend to have a lot of sodium even if they don't taste salty. In fact, many sweet foods have a lot of sodium in them. The only way to know for sure how much sodium is in a food is to check the label (figure 1).  Here are some examples of foods that often have too much sodium:   Canned soups   Rice and noodle mixes   Sauces, dressings, and condiments (such as ketchup and mustard)   Pre-made frozen meals (also called \"TV dinners\")   Deli meats, hot dogs, and " "cheeses   Smoked, cured, or pickled foods   Salted snack foods and nuts   Restaurant meals  What should I do to reduce the amount of sodium in my diet? -- Many people think that eating a low-sodium diet just means not adding salt to their food. But this is not true. Not adding salt at the table or when cooking will help a little. But almost all of the sodium you eat is already in the food you buy at the grocery store or at restaurants (figure 2).  Here are some tips to help you eat less sodium:   Avoid processed foods when possible. This is the most important thing you can do to eat less sodium. Processed foods include most foods that are sold in cans, boxes, jars, and bags.   Instead of buying pre-made, processed foods, buy fresh or fresh-frozen fruits and vegetables. (\"Fresh-frozen\" means that the food is frozen without anything added to it.)   Buy meats, fish, chicken, and turkey that are fresh instead of canned or sold at the deli counter. (Meats sold at the deli counter are high in sodium.)   Try to eat at restaurants less often.   When possible, try to make meals from scratch at home using fresh ingredients.   If you do buy canned or packaged foods, choose ones that are labeled \"sodium free\" or \"very low sodium\" (table 1). Or choose foods that have less than 400 milligrams of sodium in each serving. The amount of sodium in each serving appears on the nutrition label (figure 1).  The table has some examples of foods to avoid and foods to choose instead (table 2).  Whatever changes you make, make them slowly. Choose 1 thing to do differently, and do that for a while. If you can keep doing that change easily, add another change. For instance, if you usually eat green beans from a can, try buying fresh or fresh-frozen green beans and cooking them at home without adding salt. If that works for you, keep doing it. Then, choose another thing to change.  If you try making a change and it doesn't work right away, don't " "give up. See if you can reduce sodium in other ways. The important thing is to take small steps and to keep doing the changes that work for you.  Can I still eat out at restaurants sometimes? -- One of best ways to limit your sodium is to only eat out at restaurants every once in a while. When you do eat out, try to choose places that offer healthier choices and fresh ingredients.  No matter where you eat, when choosing your food:   Ask your  if your meal can be made without salt.   Avoid foods that come with sauces or dips.   Choose plain grilled meats or fish and steamed vegetables.   Ask for oil and vinegar for your salad, rather than dressing.   If a meal you really want has more sodium than you should have, consider saving half of it to eat another day.  What if food just does not taste as good without sodium? -- Starting a low-sodium diet can be hard. The good news is that your taste buds can get used to having less sodium. But you have to give them some time to adjust.  It can also help to try other ways to add flavor to your foods. Try things like herbs, spices, lemon juice, and vinegar.  What about salt substitutes? -- Flavoring your food with a salt substitute is a good way to reduce how much salt you eat. But check with your doctor or nurse before trying this. Some salt substitutes can be dangerous if you have certain health problems or take certain medicines.  Do medicines have sodium? -- Yes, some medicines contain sodium. If you are buying medicines you can get without a prescription, look to see how much sodium they have. Avoid products that have \"sodium carbonate\" or \"sodium bicarbonate\" unless your doctor prescribes them. (Sodium bicarbonate is baking soda.)  All topics are updated as new evidence becomes available and our peer review process is complete.  This topic retrieved from [employer] on: Mar 22, 2024.  Topic 13723 Version 14.0  Release: 32.2.4 - C32.80  © 2024 [employer] Inc[employer] and/or its " "affiliates. All rights reserved.  figure 1: Food labels can be tricky     To figure out how much sodium you are eating, check the label to find out how much sodium is in 1 serving. If you are having more than 1 serving, multiply that amount by the number of servings you plan to eat. For instance, if you are going to eat this whole can of soup, you should multiply 850 by 2. That means that you will be having 1700 milligrams of sodium. That's more sodium than many people are supposed to have in 1 day.  Graphic 92038 Version 8.0  figure 2: Sources of sodium in your diet     Graphic 09911 Version 2.0  table 1: A guide to common nutrient claims and what they mean  Salt/sodium-free  Less than 5 mg of sodium per serving   Very low sodium  35 mg or less of sodium per serving   Low sodium  140 mg or less of sodium per serving   Reduced sodium  At least 25% less sodium than the regular product   Light or lite in sodium  At least 50% less sodium than the regular product   No salt added or unsalted  No salt is added during processing, but these products may not be salt/sodium-free unless stated   mg: milligram; %: percent.  Graphic 65442 Version 7.0  table 2: Ways to cut down on salt (sodium)  Avoid these foods  Try these foods instead    Cured and smoked foods like bruce, sausage, smoked fish and meats, hot dogs, ham, lunch meats, corned beef, and pickles Fresh turkey, chicken, and lean beef   Canned fish (tuna, sardines) Unsalted tuna or sardines   Canned meats Fresh unprocessed meats, vegetable protein, and fish  Frozen and canned meats, vegetable protein, and fish that are labeled \"low-sodium\"   Salted pretzels, crackers, potato chips, tortilla chips, and nuts Low-sodium and unsalted versions of these foods   Most cheeses Low-sodium cheeses (check label for actual sodium content)   Sauces (tomato and cream, etc), tomato juices Low-sodium versions of these foods, such as low-sodium tomato juice   Processed, instant, and " "convenience foods like frozen dinners, packaged meals, canned soups, and boxed pasta blends Cook and freeze your own low-sodium meals, soups, and broths    If you do need to use convenience or processed foods, read the labels. Choose items with 140 to 200 mg of sodium per serving.    For an entire convenience meal (frozen dinner), try to find options with less than 500 to 600 mg of sodium.    If you used canned foods, look for those labeled \"sodium-free.\" Or you can rinse the canned food under water to lower the sodium content.   Fast foods and foods prepared at restaurants (unless without cheese, sauces, or added salt) Fresh foods and foods with sauces on the side  Request that food be prepared without cheese or added salt   Graphic 71103 Version 10.0  Consumer Information Use and Disclaimer   Disclaimer: This generalized information is a limited summary of diagnosis, treatment, and/or medication information. It is not meant to be comprehensive and should be used as a tool to help the user understand and/or assess potential diagnostic and treatment options. It does NOT include all information about conditions, treatments, medications, side effects, or risks that may apply to a specific patient. It is not intended to be medical advice or a substitute for the medical advice, diagnosis, or treatment of a health care provider based on the health care provider's examination and assessment of a patient's specific and unique circumstances. Patients must speak with a health care provider for complete information about their health, medical questions, and treatment options, including any risks or benefits regarding use of medications. This information does not endorse any treatments or medications as safe, effective, or approved for treating a specific patient. Up[employer]Date, Inc. and its affiliates disclaim any warranty or liability relating to this information or the use thereof.The use of this information is governed by the " Terms of Use, available at https://www.woltersLatamLeapuwer.com/en/know/clinical-effectiveness-terms. 2024© [employer] and its affiliates and/or licensors. All rights reserved.  Copyright   © 2024 [employer] and/or its affiliates. All rights reserved.

## 2025-05-15 NOTE — TELEPHONE ENCOUNTER
Reason for call:   [x] Refill   [] Prior Auth  [x] Other: Not a dup- pt wants 90 days supply     Office:   [] PCP/Provider -   [x] Specialty/Provider - [facility]     Medication:     lisinopril (ZESTRIL) 10 mg Take 1 tablet (10 mg total) by mouth daily        Pharmacy: [facility] [place], [place]     Local Pharmacy   Does the patient have enough for 3 days?   [] Yes   [x] No - Send as HP to POD    Mail Away Pharmacy   Does the patient have enough for 10 days?   [] Yes   [] No - Send as HP to POD

## 2025-05-15 NOTE — ASSESSMENT & PLAN NOTE
Vitamin D level 8.2 in May 2025, patient is started on vitamin D 50,000 units weekly by dermatology recently.

## 2025-05-15 NOTE — PROGRESS NOTES
NEPHROLOGY OUTPATIENT PROGRESS NOTE   Venita Moore 55 y.o. female MRN: [id number]  DATE: 5/15/2025  Reason for visit:   Chief Complaint   Patient presents with    Follow-up    Hypertension     ASSESSMENT and PLAN:  Assessment & Plan  Essential hypertension  Hypertension  -Blood pressure well-controlled in the office today.    -Her home BP readings are generally 120s to low 130s/70s  -Current regimen includes lisinopril 10 mg daily.  -She was recently started on minoxidil 2.5 mg daily for hair loss by dermatology 1 week ago.  -She takes as needed Lasix generally once or twice a week.  Advised to use compression stockings, keep legs elevated to minimize Lasix use.  -If continue to have worsening leg edema, may consider discussing with dermatology if minoxidil can be changed to topical.  -Her upper arm BP machine was compared with our office readings prior which are identical.   -Salt restricted diet recommended.  She admits to be using higher salt diet at times  - She has history of taking Advil/naproxen about 3 to 4 tablets/week in the past but now continue to remain off.      Renal function stable with serum creatinine 0.9  -UA bland without hematuria or proteinuria, UACR nonsignificant in May 2025.  Hypokalemia  Hypokalemia  -Chronic persistent hypokalemia issues noted since early 2023.  -Resolved, serum potassium 4.3 within normal range.  -Patient continued to remain off potassium supplements since last visit.  -Continue to remain off HCTZ since last visit.  -Serum aldosterone 11, PRA 14.2 in October 2024 could be in the setting of HCTZ  Vitamin D deficiency  Vitamin D level 8.2 in May 2025, patient is started on vitamin D 50,000 units weekly by dermatology recently.    Diagnoses and all orders for this visit:    Essential hypertension  -     Albumin / creatinine urine ratio; Future  -     Basic metabolic panel; Future    Hypokalemia  -     Albumin / creatinine urine ratio; Future  -     Basic metabolic panel;  "Future    Vitamin D deficiency          SUBJECTIVE / HPI:  Micah Moore is a 55 y.o. year old female with medical issues of hypertension for 12 years, hyperlipidemia, hypothyroidism, sleep apnea on CPAP, restless leg syndrome, who presents for regular follow-up of hypertension, hypokalemia.     Overall feels well.  She was recently started on p.o. minoxidil due to hair loss about a week ago by dermatology.  She has off-and-on leg swelling issues and uses Lasix as needed generally once or twice a week.  Her home BP readings are overall acceptable as mentioned above.  BP well-controlled in the office today.    She was found to have chronic persistent hypokalemia issues since early 2023.  HCTZ and potassium supplements were discontinued during last office visit and since then potassium seems to be stable.  She denies any urinary complaint.  Denies any nausea, vomiting, diarrhea or GI loss history.  Leg edema seems to be stable.  She has gained about 5 pounds since last visit.  She admits to be using/adding salt in her diet at times.  Denies smoking.  Has history of taking Advil/naproxen 3 to 4 tablets/week.     Family history includes father and mother both having hypertension.    REVIEW OF SYSTEMS:  More than 10 point review of systems were obtained and discussed in length with the patient. Complete review of systems were negative / unremarkable except mentioned above.     PHYSICAL EXAM:  Vitals:    05/15/25 0803 05/15/25 0825   BP: 122/64 118/64   BP Location: Left arm    Patient Position: Sitting    Cuff Size: Large    Weight: 93.9 kg (207 lb)    Height: 5' 6\" (1.676 m)      Body mass index is 33.41 kg/m².    Physical Exam  Vitals reviewed.   Constitutional:       Appearance: She is well-developed.   HENT:      Head: Normocephalic and atraumatic.      Right Ear: External ear normal.      Left Ear: External ear normal.     Eyes:      Conjunctiva/sclera: Conjunctivae normal.       Cardiovascular:      Comments: " Minimal edema in legs  Pulmonary:      Effort: Pulmonary effort is normal.      Breath sounds: Normal breath sounds. No wheezing or rales.   Abdominal:      General: Bowel sounds are normal. There is no distension.      Palpations: Abdomen is soft.      Tenderness: There is no abdominal tenderness.     Musculoskeletal:         General: No deformity.   Lymphadenopathy:      Cervical: No cervical adenopathy.     Skin:     Findings: No rash.     Neurological:      Mental Status: She is alert and oriented to person, place, and time.     Psychiatric:         Behavior: Behavior normal.         PAST MEDICAL HISTORY:  Past Medical History:   Diagnosis Date    Axenfeld-Dora syndrome     BRCA1 negative     BRCA2 negative     Disease of thyroid gland     Herniated nucleus pulposus, L5-S1     last assesed [id number]    Hypertension     Obesity     Skin tag     Sleep apnea     Visual impairment Congenital    Axenfeld Riegers Anomaly       PAST SURGICAL HISTORY:  Past Surgical History:   Procedure Laterality Date    CATARACT EXTRACTION, BILATERAL Bilateral     CLOSED REDUCTION ELBOW DISLOCATION      CORNEAL TRANSPLANT      EYE SURGERY  , ,     Bilat cataracts , cornea tranplant 2013    WISDOM TOOTH EXTRACTION         SOCIAL HISTORY:  Social History     Substance and Sexual Activity   Alcohol Use Not Currently    Comment: socially     Social History     Substance and Sexual Activity   Drug Use No     Tobacco Use History[1]    FAMILY HISTORY:  Family History   Problem Relation Age of Onset    Atrial fibrillation Mother     Stroke Mother     Hypertension Mother     Supraventricular tachycardia Mother     Rheum arthritis Mother     Autoimmune disease Mother         RA    Basal cell carcinoma Mother     Heart attack Father     Heart disease Father         , MI age 56    Hypertension Father     No Known Problems Sister     No Known Problems Sister     Thyroid disease Daughter     No Known Problems Daughter     No  Known Problems Maternal Grandmother     No Known Problems Maternal Grandfather     No Known Problems Paternal Grandmother     No Known Problems Paternal Grandfather     Breast cancer Paternal Aunt 70    No Known Problems Paternal Aunt     No Known Problems Paternal Aunt     No Known Problems Paternal Aunt     No Known Problems Paternal Aunt     Cancer Neg Hx     Diabetes Neg Hx        MEDICATIONS:  Current Medications[2]    Lab Results:   Results for orders placed or performed in visit on 05/10/25   Albumin / creatinine urine ratio   Result Value Ref Range    Creatinine, Ur 50.0 Reference range not established. mg/dL    Albumin,U,Random 10.6 <20.0 mg/L    Albumin Creat Ratio 21 0 - 30 mg/g creatinine   CBC and differential   Result Value Ref Range    WBC 5.43 4.31 - 10.16 Thousand/uL    RBC 4.49 3.81 - 5.12 Million/uL    Hemoglobin 13.1 11.5 - 15.4 g/dL    Hematocrit 39.5 34.8 - 46.1 %    MCV 88 82 - 98 fL    MCH 29.2 26.8 - 34.3 pg    MCHC 33.2 31.4 - 37.4 g/dL    RDW 13.3 11.6 - 15.1 %    MPV 11.2 8.9 - 12.7 fL    Platelets 214 149 - 390 Thousands/uL    nRBC 0 /100 WBCs    Segmented % 63 43 - 75 %    Immature Grans % 1 0 - 2 %    Lymphocytes % 25 14 - 44 %    Monocytes % 8 4 - 12 %    Eosinophils Relative 2 0 - 6 %    Basophils Relative 1 0 - 1 %    Absolute Neutrophils 3.47 1.85 - 7.62 Thousands/µL    Absolute Immature Grans 0.03 0.00 - 0.20 Thousand/uL    Absolute Lymphocytes 1.35 0.60 - 4.47 Thousands/µL    Absolute Monocytes 0.42 0.17 - 1.22 Thousand/µL    Eosinophils Absolute 0.11 0.00 - 0.61 Thousand/µL    Basophils Absolute 0.05 0.00 - 0.10 Thousands/µL   Comprehensive metabolic panel   Result Value Ref Range    Sodium 139 135 - 147 mmol/L    Potassium 4.3 3.5 - 5.3 mmol/L    Chloride 102 96 - 108 mmol/L    CO2 29 21 - 32 mmol/L    ANION GAP 8 4 - 13 mmol/L    BUN 14 5 - 25 mg/dL    Creatinine 0.90 0.60 - 1.30 mg/dL    Glucose, Fasting 106 (H) 65 - 99 mg/dL    Calcium 9.6 8.4 - 10.2 mg/dL    AST 24 13 - 39  U/L    ALT 36 7 - 52 U/L    Alkaline Phosphatase 125 (H) 34 - 104 U/L    Total Protein 7.2 6.4 - 8.4 g/dL    Albumin 4.4 3.5 - 5.0 g/dL    Total Bilirubin 0.46 0.20 - 1.00 mg/dL    eGFR 72 ml/min/1.73sq m   TSH, 3rd generation with Free T4 reflex   Result Value Ref Range    TSH 3RD GENERATON 2.956 0.450 - 4.500 uIU/mL   Lipid Panel with Direct LDL reflex   Result Value Ref Range    Cholesterol 213 (H) See Comment mg/dL    Triglycerides 335 (H) See Comment mg/dL    HDL, Direct 36 (L) >=50 mg/dL    LDL Calculated 110 (H) 0 - 100 mg/dL   Hemoglobin A1C   Result Value Ref Range    Hemoglobin A1C 5.8 (H) Normal 4.0-5.6%; PreDiabetic 5.7-6.4%; Diabetic >=6.5%; Glycemic control for adults with diabetes <7.0% %     mg/dl   Vitamin D 25 hydroxy   Result Value Ref Range    Vit D, 25-Hydroxy 8.2 (L) 30.0 - 100.0 ng/mL          [1]   Social History  Tobacco Use   Smoking Status Never   Smokeless Tobacco Never   [2]   Current Outpatient Medications:     acetaminophen (TYLENOL) 325 mg tablet, Take by mouth as needed, Disp: , Rfl:     albuterol (Ventolin HFA) 90 mcg/act inhaler, Inhale 2 puffs every 6 (six) hours as needed for wheezing, Disp: 18 g, Rfl: 5    atorvastatin (LIPITOR) 10 mg tablet, Take 1 tablet (10 mg total) by mouth daily, Disp: 90 tablet, Rfl: 3    carboxymethylcellulose (REFRESH PLUS) 0.5 % SOLN, Apply to eye, Disp: , Rfl:     ergocalciferol (ERGOCALCIFEROL) 1.25 MG (43037 UT) capsule, Take 1 capsule (50,000 Units total) by mouth once a week, Disp: 8 capsule, Rfl: 0    FLUoxetine (PROzac) 20 mg capsule, Take 1 capsule (20 mg total) by mouth daily, Disp: 30 capsule, Rfl: 0    furosemide (LASIX) 20 mg tablet, Take 1 tablet (20 mg total) by mouth if needed (As needed for worsening leg swelling or weight gain greater than 5 pounds in 2 to 3 days), Disp: 30 tablet, Rfl: 0    levofloxacin (LEVAQUIN) 500 mg tablet, Take 1 tablet (500 mg total) by mouth every 24 hours for 7 days, Disp: 7 tablet, Rfl: 0     levothyroxine 50 mcg tablet, Take 1 tablet (50 mcg total) by mouth daily, Disp: 30 tablet, Rfl: 0    lisinopril (ZESTRIL) 10 mg tablet, Take 1 tablet (10 mg total) by mouth daily, Disp: 30 tablet, Rfl: 5    minoxidil (LONITEN) 2.5 mg tablet, Take 1/2 pill daily for 1 month, then take full pill daily, Disp: 45 tablet, Rfl: 1

## 2025-05-15 NOTE — ASSESSMENT & PLAN NOTE
Hypokalemia  -Chronic persistent hypokalemia issues noted since early 2023.  -Resolved, serum potassium 4.3 within normal range.  -Patient continued to remain off potassium supplements since last visit.  -Continue to remain off HCTZ since last visit.  -Serum aldosterone 11, PRA 14.2 in October 2024 could be in the setting of HCTZ

## 2025-05-22 DIAGNOSIS — E03.9 HYPOTHYROIDISM, UNSPECIFIED TYPE: ICD-10-CM

## 2025-05-22 DIAGNOSIS — F41.1 GENERALIZED ANXIETY DISORDER: ICD-10-CM

## 2025-05-23 RX ORDER — LEVOTHYROXINE SODIUM 50 UG/1
50 TABLET ORAL DAILY
Qty: 90 TABLET | Refills: 1 | Status: SHIPPED | OUTPATIENT
Start: 2025-05-23

## 2025-05-27 ENCOUNTER — PATIENT MESSAGE (OUTPATIENT)
Dept: DERMATOLOGY | Facility: CLINIC | Age: 55
End: 2025-05-27

## 2025-05-28 DIAGNOSIS — L64.9 ANDROGENETIC ALOPECIA: Primary | ICD-10-CM

## 2025-05-28 RX ORDER — SPIRONOLACTONE 50 MG/1
50 TABLET, FILM COATED ORAL DAILY
Qty: 30 TABLET | Refills: 1 | Status: SHIPPED | OUTPATIENT
Start: 2025-05-28

## 2025-06-13 LAB — COLOGUARD RESULT REPORTABLE: NEGATIVE
